# Patient Record
Sex: MALE | Race: WHITE | NOT HISPANIC OR LATINO
[De-identification: names, ages, dates, MRNs, and addresses within clinical notes are randomized per-mention and may not be internally consistent; named-entity substitution may affect disease eponyms.]

---

## 2018-01-16 ENCOUNTER — APPOINTMENT (OUTPATIENT)
Dept: ORTHOPEDIC SURGERY | Facility: CLINIC | Age: 53
End: 2018-01-16
Payer: COMMERCIAL

## 2018-01-16 VITALS
HEIGHT: 66 IN | HEART RATE: 71 BPM | WEIGHT: 245 LBS | BODY MASS INDEX: 39.37 KG/M2 | SYSTOLIC BLOOD PRESSURE: 147 MMHG | DIASTOLIC BLOOD PRESSURE: 78 MMHG

## 2018-01-16 DIAGNOSIS — Z78.9 OTHER SPECIFIED HEALTH STATUS: ICD-10-CM

## 2018-01-16 DIAGNOSIS — C44.90 UNSPECIFIED MALIGNANT NEOPLASM OF SKIN, UNSPECIFIED: ICD-10-CM

## 2018-01-16 DIAGNOSIS — M21.41 FLAT FOOT [PES PLANUS] (ACQUIRED), RIGHT FOOT: ICD-10-CM

## 2018-01-16 DIAGNOSIS — Z86.69 PERSONAL HISTORY OF OTHER DISEASES OF THE NERVOUS SYSTEM AND SENSE ORGANS: ICD-10-CM

## 2018-01-16 DIAGNOSIS — Z80.9 FAMILY HISTORY OF MALIGNANT NEOPLASM, UNSPECIFIED: ICD-10-CM

## 2018-01-16 DIAGNOSIS — Q66.6 OTHER CONGENITAL VALGUS DEFORMITIES OF FEET: ICD-10-CM

## 2018-01-16 DIAGNOSIS — Z82.61 FAMILY HISTORY OF ARTHRITIS: ICD-10-CM

## 2018-01-16 DIAGNOSIS — G89.29 PAIN IN RIGHT ANKLE AND JOINTS OF RIGHT FOOT: ICD-10-CM

## 2018-01-16 DIAGNOSIS — M25.571 PAIN IN RIGHT ANKLE AND JOINTS OF RIGHT FOOT: ICD-10-CM

## 2018-01-16 PROCEDURE — 99203 OFFICE O/P NEW LOW 30 MIN: CPT

## 2018-01-16 PROCEDURE — 73610 X-RAY EXAM OF ANKLE: CPT | Mod: RT

## 2018-01-16 PROCEDURE — 73620 X-RAY EXAM OF FOOT: CPT | Mod: RT

## 2018-01-16 RX ORDER — CHROMIUM 200 MCG
TABLET ORAL
Refills: 0 | Status: ACTIVE | COMMUNITY

## 2018-01-16 RX ORDER — SILODOSIN 8 MG/1
CAPSULE ORAL
Refills: 0 | Status: ACTIVE | COMMUNITY

## 2018-01-16 RX ORDER — CETIRIZINE HYDROCHLORIDE 10 MG/1
10 TABLET, COATED ORAL
Refills: 0 | Status: ACTIVE | COMMUNITY

## 2018-01-17 ENCOUNTER — TRANSCRIPTION ENCOUNTER (OUTPATIENT)
Age: 53
End: 2018-01-17

## 2018-02-05 ENCOUNTER — FORM ENCOUNTER (OUTPATIENT)
Age: 53
End: 2018-02-05

## 2018-02-06 ENCOUNTER — APPOINTMENT (OUTPATIENT)
Dept: CT IMAGING | Facility: CLINIC | Age: 53
End: 2018-02-06
Payer: COMMERCIAL

## 2018-02-06 ENCOUNTER — OUTPATIENT (OUTPATIENT)
Dept: OUTPATIENT SERVICES | Facility: HOSPITAL | Age: 53
LOS: 1 days | End: 2018-02-06
Payer: COMMERCIAL

## 2018-02-06 DIAGNOSIS — Z00.8 ENCOUNTER FOR OTHER GENERAL EXAMINATION: ICD-10-CM

## 2018-02-06 PROCEDURE — 73700 CT LOWER EXTREMITY W/O DYE: CPT

## 2018-02-06 PROCEDURE — 73700 CT LOWER EXTREMITY W/O DYE: CPT | Mod: 26,RT

## 2018-02-06 PROCEDURE — 76376 3D RENDER W/INTRP POSTPROCES: CPT

## 2018-02-06 PROCEDURE — 76376 3D RENDER W/INTRP POSTPROCES: CPT | Mod: 26

## 2018-03-06 ENCOUNTER — APPOINTMENT (OUTPATIENT)
Dept: ORTHOPEDIC SURGERY | Facility: CLINIC | Age: 53
End: 2018-03-06
Payer: COMMERCIAL

## 2018-03-06 VITALS
HEIGHT: 66 IN | BODY MASS INDEX: 39.37 KG/M2 | HEART RATE: 71 BPM | WEIGHT: 245 LBS | DIASTOLIC BLOOD PRESSURE: 82 MMHG | SYSTOLIC BLOOD PRESSURE: 149 MMHG

## 2018-03-06 PROCEDURE — 99213 OFFICE O/P EST LOW 20 MIN: CPT

## 2018-04-17 ENCOUNTER — OUTPATIENT (OUTPATIENT)
Dept: OUTPATIENT SERVICES | Facility: HOSPITAL | Age: 53
LOS: 1 days | End: 2018-04-17
Payer: COMMERCIAL

## 2018-04-17 VITALS
DIASTOLIC BLOOD PRESSURE: 83 MMHG | SYSTOLIC BLOOD PRESSURE: 147 MMHG | WEIGHT: 248.9 LBS | OXYGEN SATURATION: 96 % | HEART RATE: 69 BPM | RESPIRATION RATE: 16 BRPM | HEIGHT: 67 IN | TEMPERATURE: 98 F

## 2018-04-17 DIAGNOSIS — Z01.818 ENCOUNTER FOR OTHER PREPROCEDURAL EXAMINATION: ICD-10-CM

## 2018-04-17 DIAGNOSIS — M21.961 UNSPECIFIED ACQUIRED DEFORMITY OF RIGHT LOWER LEG: ICD-10-CM

## 2018-04-17 DIAGNOSIS — M21.41 FLAT FOOT [PES PLANUS] (ACQUIRED), RIGHT FOOT: ICD-10-CM

## 2018-04-17 DIAGNOSIS — M19.071 PRIMARY OSTEOARTHRITIS, RIGHT ANKLE AND FOOT: ICD-10-CM

## 2018-04-17 LAB
ALBUMIN SERPL ELPH-MCNC: 4.2 G/DL — SIGNIFICANT CHANGE UP (ref 3.3–5)
ALP SERPL-CCNC: 68 U/L — SIGNIFICANT CHANGE UP (ref 30–120)
ALT FLD-CCNC: 44 U/L DA — SIGNIFICANT CHANGE UP (ref 10–60)
ANION GAP SERPL CALC-SCNC: 7 MMOL/L — SIGNIFICANT CHANGE UP (ref 5–17)
AST SERPL-CCNC: 18 U/L — SIGNIFICANT CHANGE UP (ref 10–40)
BILIRUB SERPL-MCNC: 0.3 MG/DL — SIGNIFICANT CHANGE UP (ref 0.2–1.2)
BUN SERPL-MCNC: 20 MG/DL — SIGNIFICANT CHANGE UP (ref 7–23)
CALCIUM SERPL-MCNC: 9.8 MG/DL — SIGNIFICANT CHANGE UP (ref 8.4–10.5)
CHLORIDE SERPL-SCNC: 107 MMOL/L — SIGNIFICANT CHANGE UP (ref 96–108)
CO2 SERPL-SCNC: 26 MMOL/L — SIGNIFICANT CHANGE UP (ref 22–31)
CREAT SERPL-MCNC: 0.93 MG/DL — SIGNIFICANT CHANGE UP (ref 0.5–1.3)
GLUCOSE SERPL-MCNC: 143 MG/DL — HIGH (ref 70–99)
HBA1C BLD-MCNC: 6.8 % — HIGH (ref 4–5.6)
HCT VFR BLD CALC: 41.2 % — SIGNIFICANT CHANGE UP (ref 39–50)
HGB BLD-MCNC: 14.1 G/DL — SIGNIFICANT CHANGE UP (ref 13–17)
MCHC RBC-ENTMCNC: 29 PG — SIGNIFICANT CHANGE UP (ref 27–34)
MCHC RBC-ENTMCNC: 34.3 GM/DL — SIGNIFICANT CHANGE UP (ref 32–36)
MCV RBC AUTO: 84.4 FL — SIGNIFICANT CHANGE UP (ref 80–100)
PLATELET # BLD AUTO: 172 K/UL — SIGNIFICANT CHANGE UP (ref 150–400)
POTASSIUM SERPL-MCNC: 4.8 MMOL/L — SIGNIFICANT CHANGE UP (ref 3.5–5.3)
POTASSIUM SERPL-SCNC: 4.8 MMOL/L — SIGNIFICANT CHANGE UP (ref 3.5–5.3)
PROT SERPL-MCNC: 7.7 G/DL — SIGNIFICANT CHANGE UP (ref 6–8.3)
RBC # BLD: 4.88 M/UL — SIGNIFICANT CHANGE UP (ref 4.2–5.8)
RBC # FLD: 14.3 % — SIGNIFICANT CHANGE UP (ref 10.3–14.5)
SODIUM SERPL-SCNC: 140 MMOL/L — SIGNIFICANT CHANGE UP (ref 135–145)
WBC # BLD: 9.5 K/UL — SIGNIFICANT CHANGE UP (ref 3.8–10.5)
WBC # FLD AUTO: 9.5 K/UL — SIGNIFICANT CHANGE UP (ref 3.8–10.5)

## 2018-04-17 PROCEDURE — 93005 ELECTROCARDIOGRAM TRACING: CPT

## 2018-04-17 PROCEDURE — 83036 HEMOGLOBIN GLYCOSYLATED A1C: CPT

## 2018-04-17 PROCEDURE — G0463: CPT

## 2018-04-17 PROCEDURE — 93010 ELECTROCARDIOGRAM REPORT: CPT | Mod: NC

## 2018-04-17 PROCEDURE — 80053 COMPREHEN METABOLIC PANEL: CPT

## 2018-04-17 PROCEDURE — 85027 COMPLETE CBC AUTOMATED: CPT

## 2018-04-17 RX ORDER — CHLORHEXIDINE GLUCONATE 213 G/1000ML
1 SOLUTION TOPICAL ONCE
Qty: 0 | Refills: 0 | Status: DISCONTINUED | OUTPATIENT
Start: 2018-04-17 | End: 2018-05-02

## 2018-04-17 NOTE — H&P PST ADULT - HISTORY OF PRESENT ILLNESS
53 yo  male is scheduled for "Right foot triple arthrosdesis" on 5/7/18 on 5/7/18 with Eduardo Clark MD.  Patient reports right foot injury when he was a teenager with longstanding pain and instability progressively worsening.  pain worst with weight bearing and linits his mobility and lifestyle.  Wears brace  right mid-foot to calf.

## 2018-04-17 NOTE — H&P PST ADULT - MUSCULOSKELETAL COMMENTS
right foot deformity ankle, skin is reddened due to pressure from brace Calcaneus bone deformed right with swelling and skin erythema over bony prominence

## 2018-04-17 NOTE — H&P PST ADULT - FAMILY HISTORY
Family history of stomach cancer     Mother  Still living? No  Family history of uterine cancer, Age at diagnosis: Age Unknown     Grandparent  Still living? No  Family history of uterine cancer, Age at diagnosis: Age Unknown

## 2018-04-17 NOTE — H&P PST ADULT - PROBLEM SELECTOR PLAN 1
"Right foot triple arthrodesis" on 5/7/18  pre op instructions were reviewed and sihned  Patient will obtain medical clearance

## 2018-04-17 NOTE — H&P PST ADULT - NEGATIVE ENMT SYMPTOMS
no nasal congestion/no hearing difficulty/no nose bleeds/no ear pain/no recurrent cold sores/no throat pain/no dysphagia/no tinnitus/no nasal discharge/no vertigo/no sinus symptoms/no dry mouth/no nasal obstruction/no post-nasal discharge/no abnormal taste sensation/no gum bleeding

## 2018-04-17 NOTE — H&P PST ADULT - PMH
Ankle deformity, right    Bladder spasms    BMI 39.0-39.9,adult    Depression    Dyslipidemia    History of squamous cell carcinoma excision  right cheek, 2015  Hypertension    Multiple sclerosis    Neuropathy  bilateral hands  Obesity (BMI 30-39.9)    Sleep apnea    Type 2 diabetes mellitus    Varicose veins  BLE

## 2018-04-20 RX ORDER — CHLORHEXIDINE GLUCONATE 213 G/1000ML
1 SOLUTION TOPICAL ONCE
Qty: 0 | Refills: 0 | Status: COMPLETED | OUTPATIENT
Start: 2018-05-07 | End: 2018-05-07

## 2018-05-04 RX ORDER — MAGNESIUM HYDROXIDE 400 MG/1
30 TABLET, CHEWABLE ORAL DAILY
Qty: 0 | Refills: 0 | Status: DISCONTINUED | OUTPATIENT
Start: 2018-05-07 | End: 2018-05-09

## 2018-05-04 RX ORDER — ONDANSETRON 8 MG/1
4 TABLET, FILM COATED ORAL EVERY 6 HOURS
Qty: 0 | Refills: 0 | Status: DISCONTINUED | OUTPATIENT
Start: 2018-05-07 | End: 2018-05-09

## 2018-05-04 RX ORDER — SODIUM CHLORIDE 9 MG/ML
1000 INJECTION, SOLUTION INTRAVENOUS
Qty: 0 | Refills: 0 | Status: DISCONTINUED | OUTPATIENT
Start: 2018-05-07 | End: 2018-05-08

## 2018-05-04 RX ORDER — DOCUSATE SODIUM 100 MG
100 CAPSULE ORAL THREE TIMES A DAY
Qty: 0 | Refills: 0 | Status: DISCONTINUED | OUTPATIENT
Start: 2018-05-07 | End: 2018-05-09

## 2018-05-04 RX ORDER — SENNA PLUS 8.6 MG/1
2 TABLET ORAL AT BEDTIME
Qty: 0 | Refills: 0 | Status: DISCONTINUED | OUTPATIENT
Start: 2018-05-07 | End: 2018-05-09

## 2018-05-04 RX ORDER — PANTOPRAZOLE SODIUM 20 MG/1
40 TABLET, DELAYED RELEASE ORAL
Qty: 0 | Refills: 0 | Status: DISCONTINUED | OUTPATIENT
Start: 2018-05-07 | End: 2018-05-09

## 2018-05-06 ENCOUNTER — TRANSCRIPTION ENCOUNTER (OUTPATIENT)
Age: 53
End: 2018-05-06

## 2018-05-07 ENCOUNTER — INPATIENT (INPATIENT)
Facility: HOSPITAL | Age: 53
LOS: 1 days | Discharge: HOME | DRG: 494 | End: 2018-05-09
Attending: ORTHOPAEDIC SURGERY | Admitting: ORTHOPAEDIC SURGERY
Payer: COMMERCIAL

## 2018-05-07 ENCOUNTER — RESULT REVIEW (OUTPATIENT)
Age: 53
End: 2018-05-07

## 2018-05-07 ENCOUNTER — APPOINTMENT (OUTPATIENT)
Dept: ORTHOPEDIC SURGERY | Facility: HOSPITAL | Age: 53
End: 2018-05-07

## 2018-05-07 VITALS
SYSTOLIC BLOOD PRESSURE: 136 MMHG | DIASTOLIC BLOOD PRESSURE: 86 MMHG | WEIGHT: 246.04 LBS | RESPIRATION RATE: 11 BRPM | TEMPERATURE: 98 F | OXYGEN SATURATION: 97 % | HEART RATE: 76 BPM | HEIGHT: 66 IN

## 2018-05-07 DIAGNOSIS — E11.9 TYPE 2 DIABETES MELLITUS WITHOUT COMPLICATIONS: ICD-10-CM

## 2018-05-07 DIAGNOSIS — I10 ESSENTIAL (PRIMARY) HYPERTENSION: ICD-10-CM

## 2018-05-07 DIAGNOSIS — E66.9 OBESITY, UNSPECIFIED: ICD-10-CM

## 2018-05-07 DIAGNOSIS — M19.071 PRIMARY OSTEOARTHRITIS, RIGHT ANKLE AND FOOT: ICD-10-CM

## 2018-05-07 DIAGNOSIS — G35 MULTIPLE SCLEROSIS: ICD-10-CM

## 2018-05-07 DIAGNOSIS — M21.41 FLAT FOOT [PES PLANUS] (ACQUIRED), RIGHT FOOT: ICD-10-CM

## 2018-05-07 DIAGNOSIS — Z01.818 ENCOUNTER FOR OTHER PREPROCEDURAL EXAMINATION: ICD-10-CM

## 2018-05-07 PROCEDURE — 28715 ARTHRODESIS TRIPLE: CPT | Mod: RT

## 2018-05-07 PROCEDURE — 88305 TISSUE EXAM BY PATHOLOGIST: CPT | Mod: 26

## 2018-05-07 PROCEDURE — 88311 DECALCIFY TISSUE: CPT | Mod: 26

## 2018-05-07 PROCEDURE — 99223 1ST HOSP IP/OBS HIGH 75: CPT

## 2018-05-07 RX ORDER — VENLAFAXINE HCL 75 MG
225 CAPSULE, EXT RELEASE 24 HR ORAL DAILY
Qty: 0 | Refills: 0 | Status: DISCONTINUED | OUTPATIENT
Start: 2018-05-07 | End: 2018-05-09

## 2018-05-07 RX ORDER — DEXTROSE 50 % IN WATER 50 %
25 SYRINGE (ML) INTRAVENOUS ONCE
Qty: 0 | Refills: 0 | Status: DISCONTINUED | OUTPATIENT
Start: 2018-05-07 | End: 2018-05-09

## 2018-05-07 RX ORDER — AMLODIPINE BESYLATE 2.5 MG/1
5 TABLET ORAL DAILY
Qty: 0 | Refills: 0 | Status: DISCONTINUED | OUTPATIENT
Start: 2018-05-07 | End: 2018-05-09

## 2018-05-07 RX ORDER — LOSARTAN POTASSIUM 100 MG/1
50 TABLET, FILM COATED ORAL DAILY
Qty: 0 | Refills: 0 | Status: DISCONTINUED | OUTPATIENT
Start: 2018-05-07 | End: 2018-05-09

## 2018-05-07 RX ORDER — DEXTROSE 50 % IN WATER 50 %
12.5 SYRINGE (ML) INTRAVENOUS ONCE
Qty: 0 | Refills: 0 | Status: DISCONTINUED | OUTPATIENT
Start: 2018-05-07 | End: 2018-05-09

## 2018-05-07 RX ORDER — HYDROMORPHONE HYDROCHLORIDE 2 MG/ML
0.5 INJECTION INTRAMUSCULAR; INTRAVENOUS; SUBCUTANEOUS
Qty: 0 | Refills: 0 | Status: DISCONTINUED | OUTPATIENT
Start: 2018-05-07 | End: 2018-05-09

## 2018-05-07 RX ORDER — DEXTROSE 50 % IN WATER 50 %
1 SYRINGE (ML) INTRAVENOUS ONCE
Qty: 0 | Refills: 0 | Status: DISCONTINUED | OUTPATIENT
Start: 2018-05-07 | End: 2018-05-09

## 2018-05-07 RX ORDER — SODIUM CHLORIDE 9 MG/ML
1000 INJECTION, SOLUTION INTRAVENOUS
Qty: 0 | Refills: 0 | Status: DISCONTINUED | OUTPATIENT
Start: 2018-05-07 | End: 2018-05-09

## 2018-05-07 RX ORDER — ONDANSETRON 8 MG/1
4 TABLET, FILM COATED ORAL ONCE
Qty: 0 | Refills: 0 | Status: COMPLETED | OUTPATIENT
Start: 2018-05-07 | End: 2018-05-07

## 2018-05-07 RX ORDER — METFORMIN HYDROCHLORIDE 850 MG/1
1000 TABLET ORAL
Qty: 0 | Refills: 0 | Status: DISCONTINUED | OUTPATIENT
Start: 2018-05-08 | End: 2018-05-09

## 2018-05-07 RX ORDER — HYDROMORPHONE HYDROCHLORIDE 2 MG/ML
0.5 INJECTION INTRAMUSCULAR; INTRAVENOUS; SUBCUTANEOUS
Qty: 0 | Refills: 0 | Status: DISCONTINUED | OUTPATIENT
Start: 2018-05-07 | End: 2018-05-07

## 2018-05-07 RX ORDER — INSULIN LISPRO 100/ML
VIAL (ML) SUBCUTANEOUS
Qty: 0 | Refills: 0 | Status: DISCONTINUED | OUTPATIENT
Start: 2018-05-07 | End: 2018-05-09

## 2018-05-07 RX ORDER — ATORVASTATIN CALCIUM 80 MG/1
10 TABLET, FILM COATED ORAL AT BEDTIME
Qty: 0 | Refills: 0 | Status: DISCONTINUED | OUTPATIENT
Start: 2018-05-07 | End: 2018-05-09

## 2018-05-07 RX ORDER — CEFAZOLIN SODIUM 1 G
2000 VIAL (EA) INJECTION ONCE
Qty: 0 | Refills: 0 | Status: COMPLETED | OUTPATIENT
Start: 2018-05-07 | End: 2018-05-07

## 2018-05-07 RX ORDER — ACETAMINOPHEN 500 MG
1000 TABLET ORAL EVERY 8 HOURS
Qty: 0 | Refills: 0 | Status: DISCONTINUED | OUTPATIENT
Start: 2018-05-08 | End: 2018-05-09

## 2018-05-07 RX ORDER — ASPIRIN/CALCIUM CARB/MAGNESIUM 324 MG
162 TABLET ORAL EVERY 12 HOURS
Qty: 0 | Refills: 0 | Status: DISCONTINUED | OUTPATIENT
Start: 2018-05-08 | End: 2018-05-09

## 2018-05-07 RX ORDER — BUPROPION HYDROCHLORIDE 150 MG/1
300 TABLET, EXTENDED RELEASE ORAL DAILY
Qty: 0 | Refills: 0 | Status: DISCONTINUED | OUTPATIENT
Start: 2018-05-07 | End: 2018-05-09

## 2018-05-07 RX ORDER — SODIUM CHLORIDE 9 MG/ML
1000 INJECTION, SOLUTION INTRAVENOUS
Qty: 0 | Refills: 0 | Status: DISCONTINUED | OUTPATIENT
Start: 2018-05-07 | End: 2018-05-07

## 2018-05-07 RX ORDER — ACETAMINOPHEN 500 MG
1000 TABLET ORAL EVERY 6 HOURS
Qty: 0 | Refills: 0 | Status: COMPLETED | OUTPATIENT
Start: 2018-05-07 | End: 2018-05-08

## 2018-05-07 RX ORDER — CEFAZOLIN SODIUM 1 G
2000 VIAL (EA) INJECTION EVERY 8 HOURS
Qty: 0 | Refills: 0 | Status: COMPLETED | OUTPATIENT
Start: 2018-05-07 | End: 2018-05-08

## 2018-05-07 RX ORDER — TAMSULOSIN HYDROCHLORIDE 0.4 MG/1
0.8 CAPSULE ORAL AT BEDTIME
Qty: 0 | Refills: 0 | Status: DISCONTINUED | OUTPATIENT
Start: 2018-05-07 | End: 2018-05-08

## 2018-05-07 RX ORDER — OXYCODONE HYDROCHLORIDE 5 MG/1
5 TABLET ORAL
Qty: 0 | Refills: 0 | Status: DISCONTINUED | OUTPATIENT
Start: 2018-05-07 | End: 2018-05-08

## 2018-05-07 RX ORDER — OXYCODONE HYDROCHLORIDE 5 MG/1
10 TABLET ORAL
Qty: 0 | Refills: 0 | Status: DISCONTINUED | OUTPATIENT
Start: 2018-05-07 | End: 2018-05-08

## 2018-05-07 RX ORDER — GLUCAGON INJECTION, SOLUTION 0.5 MG/.1ML
1 INJECTION, SOLUTION SUBCUTANEOUS ONCE
Qty: 0 | Refills: 0 | Status: DISCONTINUED | OUTPATIENT
Start: 2018-05-07 | End: 2018-05-09

## 2018-05-07 RX ADMIN — SODIUM CHLORIDE 100 MILLILITER(S): 9 INJECTION, SOLUTION INTRAVENOUS at 23:20

## 2018-05-07 RX ADMIN — HYDROMORPHONE HYDROCHLORIDE 0.5 MILLIGRAM(S): 2 INJECTION INTRAMUSCULAR; INTRAVENOUS; SUBCUTANEOUS at 18:20

## 2018-05-07 RX ADMIN — Medication 100 MILLIGRAM(S): at 22:23

## 2018-05-07 RX ADMIN — ONDANSETRON 4 MILLIGRAM(S): 8 TABLET, FILM COATED ORAL at 18:27

## 2018-05-07 RX ADMIN — HYDROMORPHONE HYDROCHLORIDE 0.5 MILLIGRAM(S): 2 INJECTION INTRAMUSCULAR; INTRAVENOUS; SUBCUTANEOUS at 23:30

## 2018-05-07 RX ADMIN — SENNA PLUS 2 TABLET(S): 8.6 TABLET ORAL at 23:16

## 2018-05-07 RX ADMIN — Medication 100 MILLIGRAM(S): at 22:14

## 2018-05-07 RX ADMIN — SODIUM CHLORIDE 100 MILLILITER(S): 9 INJECTION, SOLUTION INTRAVENOUS at 18:45

## 2018-05-07 RX ADMIN — ATORVASTATIN CALCIUM 10 MILLIGRAM(S): 80 TABLET, FILM COATED ORAL at 22:14

## 2018-05-07 RX ADMIN — HYDROMORPHONE HYDROCHLORIDE 0.5 MILLIGRAM(S): 2 INJECTION INTRAMUSCULAR; INTRAVENOUS; SUBCUTANEOUS at 20:36

## 2018-05-07 RX ADMIN — Medication 400 MILLIGRAM(S): at 22:23

## 2018-05-07 RX ADMIN — Medication 1: at 20:00

## 2018-05-07 RX ADMIN — HYDROMORPHONE HYDROCHLORIDE 0.5 MILLIGRAM(S): 2 INJECTION INTRAMUSCULAR; INTRAVENOUS; SUBCUTANEOUS at 19:30

## 2018-05-07 RX ADMIN — CHLORHEXIDINE GLUCONATE 1 APPLICATION(S): 213 SOLUTION TOPICAL at 11:24

## 2018-05-07 RX ADMIN — HYDROMORPHONE HYDROCHLORIDE 0.5 MILLIGRAM(S): 2 INJECTION INTRAMUSCULAR; INTRAVENOUS; SUBCUTANEOUS at 19:31

## 2018-05-07 RX ADMIN — HYDROMORPHONE HYDROCHLORIDE 0.5 MILLIGRAM(S): 2 INJECTION INTRAMUSCULAR; INTRAVENOUS; SUBCUTANEOUS at 18:55

## 2018-05-07 RX ADMIN — Medication 1000 MILLIGRAM(S): at 22:26

## 2018-05-07 RX ADMIN — HYDROMORPHONE HYDROCHLORIDE 0.5 MILLIGRAM(S): 2 INJECTION INTRAMUSCULAR; INTRAVENOUS; SUBCUTANEOUS at 18:40

## 2018-05-07 RX ADMIN — HYDROMORPHONE HYDROCHLORIDE 0.5 MILLIGRAM(S): 2 INJECTION INTRAMUSCULAR; INTRAVENOUS; SUBCUTANEOUS at 23:11

## 2018-05-07 NOTE — CONSULT NOTE ADULT - SUBJECTIVE AND OBJECTIVE BOX
Patient is a 52y old  Male who presents with a chief complaint of right foot surgery (07 May 2018 11:14)        HPI: primary severe osteoarthritis of joint s/p triple arthrodesis right foot.    known MS without respiratory complications.    chronic neuropathy    obesity contributing to JERRELL; uses CPAP.    Type 2 DM controlled.      PAST MEDICAL & SURGICAL HISTORY:  BMI 39.0-39.9,adult  Obesity (BMI 30-39.9)  Ankle deformity, right  Neuropathy: bilateral hands  Bladder spasms  Varicose veins: BLE  Sleep apnea  Multiple sclerosis  Depression  Hypertension  Dyslipidemia  Type 2 diabetes mellitus  History of squamous cell carcinoma excision: right cheek, 2015  No significant past surgical history      REVIEW OF SYSTEMS:    negative unless otherwise specified in HPI.      MEDICATIONS  (STANDING):  lactated ringers. 1000 milliLiter(s) (100 mL/Hr) IV Continuous <Continuous>    MEDICATIONS  (PRN):  HYDROmorphone  Injectable 0.5 milliGRAM(s) IV Push every 10 minutes PRN Severe Pain (7 - 10)      Allergies    latex (Rash)  No Known Drug Allergies    Intolerances        SOCIAL HISTORY: no toxic habits reported    FAMILY HISTORY:  Family history of uterine cancer (Grandparent)  Family history of stomach cancer  Family history of uterine cancer (Mother)      Vital Signs Last 24 Hrs  T(C): 36.6 (07 May 2018 11:09), Max: 36.6 (07 May 2018 11:09)  T(F): 97.8 (07 May 2018 11:09), Max: 97.8 (07 May 2018 11:09)  HR: 76 (07 May 2018 11:09) (76 - 76)  BP: 136/86 (07 May 2018 11:09) (136/86 - 136/86)  BP(mean): --  RR: 11 (07 May 2018 11:09) (11 - 11)  SpO2: 97% (07 May 2018 11:09) (97% - 97%)    PHYSICAL EXAM:  GENERAL: No apparent distress  HEAD:  Atraumatic, Normocephalic  EYES: conjunctiva and sclera clear  ENMT: Moist mucous membranes  NECK: Supple  CHEST/LUNG: Clear to auscultation; no rales/wheeze or rubs  HEART: Regular rate and rhythm, no murmurs, rubs or gallops  ABDOMEN: Soft, Nontender, Nondistended; Bowel sounds present, OBESE  EXTREMITIES:  No clubbing, cyanosis or edema  SKIN: No rashes or lesions  NERVOUS SYSTEM:  Alert & Oriented X3; Bilateral lower extremity mobile, sensation to light touch intact  INCISION:  Dressing dry and intact    LABS:                  A1C 6.8                            IMAGING: imaging reviewed personally - triple arthrodesis right foot    Consultant Notes Reviewed and Care Discussed with relevant Consultants/Other Providers.

## 2018-05-07 NOTE — CONSULT NOTE ADULT - SUBJECTIVE AND OBJECTIVE BOX
PULMONARY/CRITICAL CARE        Patient is a 52y old  Male who presents with a chief complaint of right foot surgery (07 May 2018 11:14)    BRIEF HOSPITAL COURSE: ***Stable postop  Hx Guido on autopap    Events last 24 hours: ***    PAST MEDICAL & SURGICAL HISTORY:  BMI 39.0-39.9,adult  Obesity (BMI 30-39.9)  Ankle deformity, right  Neuropathy: bilateral hands  Bladder spasms  Varicose veins: BLE  Sleep apnea  Multiple sclerosis  Depression  Hypertension  Dyslipidemia  Type 2 diabetes mellitus  History of squamous cell carcinoma excision: right cheek, 2015  No significant past surgical history    Allergies    latex (Rash)  No Known Drug Allergies    Intolerances      FAMILY HISTORY:  Family history of uterine cancer (Grandparent)  Family history of stomach cancer  Family history of uterine cancer (Mother)      Review of Systems:  CONSTITUTIONAL: No fever, chills, or fatigue  EYES: No eye pain, visual disturbances, or discharge  ENMT:  No difficulty hearing, tinnitus, vertigo; No sinus or throat pain  NECK: No pain or stiffness  RESPIRATORY: No cough, wheezing, chills or hemoptysis; No shortness of breath  CARDIOVASCULAR: No chest pain, palpitations, dizziness, or leg swelling  GASTROINTESTINAL: No abdominal or epigastric pain. No nausea, vomiting, or hematemesis; No diarrhea or constipation. No melena or hematochezia.  GENITOURINARY: No dysuria, frequency, hematuria, or incontinence  NEUROLOGICAL: No headaches, memory loss, loss of strength, numbness, or tremors  SKIN: No itching, burning, rashes, or lesions   MUSCULOSKELETAL: No joint pain or swelling; right ankle pain  PSYCHIATRIC: No depression, anxiety, mood swings, or difficulty sleeping      Medications:        HYDROmorphone  Injectable 0.5 milliGRAM(s) IV Push every 10 minutes PRN  promethazine IVPB 6.25 milliGRAM(s) IV Intermittent once PRN              lactated ringers. 1000 milliLiter(s) IV Continuous <Continuous>                ICU Vital Signs Last 24 Hrs  T(C): 36.9 (07 May 2018 18:11), Max: 36.9 (07 May 2018 18:11)  T(F): 98.4 (07 May 2018 18:11), Max: 98.4 (07 May 2018 18:11)  HR: 93 (07 May 2018 18:45) (76 - 99)  BP: 126/78 (07 May 2018 18:45) (126/78 - 152/50)  BP(mean): --  ABP: --  ABP(mean): --  RR: 8 (07 May 2018 18:45) (8 - 18)  SpO2: 98% (07 May 2018 18:45) (97% - 100%)    Vital Signs Last 24 Hrs  T(C): 36.9 (07 May 2018 18:11), Max: 36.9 (07 May 2018 18:11)  T(F): 98.4 (07 May 2018 18:11), Max: 98.4 (07 May 2018 18:11)  HR: 93 (07 May 2018 18:45) (76 - 99)  BP: 126/78 (07 May 2018 18:45) (126/78 - 152/50)  BP(mean): --  RR: 8 (07 May 2018 18:45) (8 - 18)  SpO2: 98% (07 May 2018 18:45) (97% - 100%)        I&O's Detail    07 May 2018 07:01  -  07 May 2018 18:45  --------------------------------------------------------  IN:    lactated ringers.: 1400 mL  Total IN: 1400 mL    OUT:    Estimated Blood Loss: 25 mL  Total OUT: 25 mL    Total NET: 1375 mL            LABS:                CAPILLARY BLOOD GLUCOSE      POCT Blood Glucose.: 137 mg/dL (07 May 2018 16:13)        CULTURES:      Physical Examination: obese male nad    General: No acute distress.      HEENT: Pupils equal, reactive to light.  Symmetric.    PULM: Clear to auscultation bilaterally, no significant sputum production    CVS: Regular rate and rhythm, no murmurs, rubs, or gallops    ABD: Soft, nondistended, nontender, normoactive bowel sounds, no masses    EXT: No edema, nontender  Right ankle bandaged.     SKIN: Warm and well perfused, no rashes noted.    NEURO: Alert, oriented, interactive, nonfocal    RADIOLOGY: ***    CRITICAL CARE TIME SPENT: ***

## 2018-05-07 NOTE — PATIENT PROFILE ADULT. - AS SC BRADEN SENSORY
Assessment completed. Pts dressings are C/D/I. Pt denies pain. States he needs assistance with digital bowel disimpaction on a daily basis. Will provide this care after AM meds and breakfast with sterile lubricating jelly. Pt is A&O and very familiar with care and needs, able to tell staff exactly what he needs to ensure that he maintains care as normal. No s/s distress noted. Low air loss mattress in use, bed is low with call light in reach. PICC line dressing is C/D/I with no s/s infection, fluids at TKO with intermittent IV ABX. Pleasant and cooperative with somewhat flat affect. Pt reports loss and depressed affect. Encouraged and supported needs.    (3) slightly limited

## 2018-05-07 NOTE — BRIEF OPERATIVE NOTE - PROCEDURE
Triple arthrodesis of right hindfoot  05/07/2018    Active  Wilder Diaz <<-----Click on this checkbox to enter Procedure

## 2018-05-08 ENCOUNTER — TRANSCRIPTION ENCOUNTER (OUTPATIENT)
Age: 53
End: 2018-05-08

## 2018-05-08 DIAGNOSIS — R33.8 OTHER RETENTION OF URINE: ICD-10-CM

## 2018-05-08 DIAGNOSIS — N31.9 NEUROMUSCULAR DYSFUNCTION OF BLADDER, UNSPECIFIED: ICD-10-CM

## 2018-05-08 LAB
ANION GAP SERPL CALC-SCNC: 8 MMOL/L — SIGNIFICANT CHANGE UP (ref 5–17)
BASOPHILS # BLD AUTO: 0.1 K/UL — SIGNIFICANT CHANGE UP (ref 0–0.2)
BASOPHILS NFR BLD AUTO: 0.9 % — SIGNIFICANT CHANGE UP (ref 0–2)
BUN SERPL-MCNC: 17 MG/DL — SIGNIFICANT CHANGE UP (ref 7–23)
CALCIUM SERPL-MCNC: 8.9 MG/DL — SIGNIFICANT CHANGE UP (ref 8.4–10.5)
CHLORIDE SERPL-SCNC: 102 MMOL/L — SIGNIFICANT CHANGE UP (ref 96–108)
CO2 SERPL-SCNC: 27 MMOL/L — SIGNIFICANT CHANGE UP (ref 22–31)
CREAT SERPL-MCNC: 0.77 MG/DL — SIGNIFICANT CHANGE UP (ref 0.5–1.3)
EOSINOPHIL # BLD AUTO: 0.1 K/UL — SIGNIFICANT CHANGE UP (ref 0–0.5)
EOSINOPHIL NFR BLD AUTO: 0.8 % — SIGNIFICANT CHANGE UP (ref 0–6)
GLUCOSE SERPL-MCNC: 125 MG/DL — HIGH (ref 70–99)
HCT VFR BLD CALC: 37.1 % — LOW (ref 39–50)
HGB BLD-MCNC: 12.5 G/DL — LOW (ref 13–17)
LYMPHOCYTES # BLD AUTO: 27.8 % — SIGNIFICANT CHANGE UP (ref 13–44)
LYMPHOCYTES # BLD AUTO: 3.3 K/UL — SIGNIFICANT CHANGE UP (ref 1–3.3)
MCHC RBC-ENTMCNC: 28.8 PG — SIGNIFICANT CHANGE UP (ref 27–34)
MCHC RBC-ENTMCNC: 33.8 GM/DL — SIGNIFICANT CHANGE UP (ref 32–36)
MCV RBC AUTO: 85.2 FL — SIGNIFICANT CHANGE UP (ref 80–100)
MONOCYTES # BLD AUTO: 0.8 K/UL — SIGNIFICANT CHANGE UP (ref 0–0.9)
MONOCYTES NFR BLD AUTO: 7.1 % — SIGNIFICANT CHANGE UP (ref 2–14)
NEUTROPHILS # BLD AUTO: 7.5 K/UL — HIGH (ref 1.8–7.4)
NEUTROPHILS NFR BLD AUTO: 63.4 % — SIGNIFICANT CHANGE UP (ref 43–77)
PLATELET # BLD AUTO: 182 K/UL — SIGNIFICANT CHANGE UP (ref 150–400)
POTASSIUM SERPL-MCNC: 4.7 MMOL/L — SIGNIFICANT CHANGE UP (ref 3.5–5.3)
POTASSIUM SERPL-SCNC: 4.7 MMOL/L — SIGNIFICANT CHANGE UP (ref 3.5–5.3)
RBC # BLD: 4.35 M/UL — SIGNIFICANT CHANGE UP (ref 4.2–5.8)
RBC # FLD: 14.8 % — HIGH (ref 10.3–14.5)
SODIUM SERPL-SCNC: 137 MMOL/L — SIGNIFICANT CHANGE UP (ref 135–145)
WBC # BLD: 11.8 K/UL — HIGH (ref 3.8–10.5)
WBC # FLD AUTO: 11.8 K/UL — HIGH (ref 3.8–10.5)

## 2018-05-08 PROCEDURE — 12345: CPT | Mod: NC

## 2018-05-08 PROCEDURE — 99233 SBSQ HOSP IP/OBS HIGH 50: CPT

## 2018-05-08 RX ORDER — ASPIRIN/CALCIUM CARB/MAGNESIUM 324 MG
2 TABLET ORAL
Qty: 164 | Refills: 0
Start: 2018-05-08 | End: 2018-06-17

## 2018-05-08 RX ORDER — PANTOPRAZOLE SODIUM 20 MG/1
1 TABLET, DELAYED RELEASE ORAL
Qty: 30 | Refills: 1
Start: 2018-05-08 | End: 2018-07-06

## 2018-05-08 RX ORDER — ACETAMINOPHEN 500 MG
2 TABLET ORAL
Qty: 0 | Refills: 0 | DISCHARGE
Start: 2018-05-08

## 2018-05-08 RX ORDER — ASPIRIN/CALCIUM CARB/MAGNESIUM 324 MG
1 TABLET ORAL
Qty: 0 | Refills: 0 | COMMUNITY

## 2018-05-08 RX ORDER — ASPIRIN/CALCIUM CARB/MAGNESIUM 324 MG
1 TABLET ORAL
Qty: 0 | Refills: 0 | DISCHARGE
Start: 2018-05-08 | End: 2018-06-17

## 2018-05-08 RX ORDER — SILODOSIN 4 MG/1
8 CAPSULE ORAL ONCE
Qty: 0 | Refills: 0 | Status: COMPLETED | OUTPATIENT
Start: 2018-05-08 | End: 2018-05-08

## 2018-05-08 RX ORDER — DOCUSATE SODIUM 100 MG
1 CAPSULE ORAL
Qty: 0 | Refills: 0 | DISCHARGE
Start: 2018-05-08

## 2018-05-08 RX ORDER — TAPENTADOL HYDROCHLORIDE 50 MG/1
50 TABLET, FILM COATED ORAL EVERY 4 HOURS
Qty: 0 | Refills: 0 | Status: DISCONTINUED | OUTPATIENT
Start: 2018-05-08 | End: 2018-05-09

## 2018-05-08 RX ORDER — SENNA PLUS 8.6 MG/1
2 TABLET ORAL
Qty: 0 | Refills: 0 | DISCHARGE
Start: 2018-05-08

## 2018-05-08 RX ORDER — SILODOSIN 4 MG/1
8 CAPSULE ORAL AT BEDTIME
Qty: 0 | Refills: 0 | Status: DISCONTINUED | OUTPATIENT
Start: 2018-05-08 | End: 2018-05-09

## 2018-05-08 RX ADMIN — TAPENTADOL HYDROCHLORIDE 50 MILLIGRAM(S): 50 TABLET, FILM COATED ORAL at 19:10

## 2018-05-08 RX ADMIN — Medication 162 MILLIGRAM(S): at 08:11

## 2018-05-08 RX ADMIN — METFORMIN HYDROCHLORIDE 1000 MILLIGRAM(S): 850 TABLET ORAL at 08:12

## 2018-05-08 RX ADMIN — HYDROMORPHONE HYDROCHLORIDE 0.5 MILLIGRAM(S): 2 INJECTION INTRAMUSCULAR; INTRAVENOUS; SUBCUTANEOUS at 03:50

## 2018-05-08 RX ADMIN — SILODOSIN 8 MILLIGRAM(S): 4 CAPSULE ORAL at 09:15

## 2018-05-08 RX ADMIN — PANTOPRAZOLE SODIUM 40 MILLIGRAM(S): 20 TABLET, DELAYED RELEASE ORAL at 05:58

## 2018-05-08 RX ADMIN — Medication 225 MILLIGRAM(S): at 12:35

## 2018-05-08 RX ADMIN — Medication 100 MILLIGRAM(S): at 05:57

## 2018-05-08 RX ADMIN — METFORMIN HYDROCHLORIDE 1000 MILLIGRAM(S): 850 TABLET ORAL at 16:59

## 2018-05-08 RX ADMIN — TAPENTADOL HYDROCHLORIDE 50 MILLIGRAM(S): 50 TABLET, FILM COATED ORAL at 17:35

## 2018-05-08 RX ADMIN — Medication 1: at 12:35

## 2018-05-08 RX ADMIN — BUPROPION HYDROCHLORIDE 300 MILLIGRAM(S): 150 TABLET, EXTENDED RELEASE ORAL at 12:35

## 2018-05-08 RX ADMIN — HYDROMORPHONE HYDROCHLORIDE 0.5 MILLIGRAM(S): 2 INJECTION INTRAMUSCULAR; INTRAVENOUS; SUBCUTANEOUS at 20:43

## 2018-05-08 RX ADMIN — Medication 100 MILLIGRAM(S): at 05:58

## 2018-05-08 RX ADMIN — Medication 1000 MILLIGRAM(S): at 09:49

## 2018-05-08 RX ADMIN — Medication 100 MILLIGRAM(S): at 20:29

## 2018-05-08 RX ADMIN — HYDROMORPHONE HYDROCHLORIDE 0.5 MILLIGRAM(S): 2 INJECTION INTRAMUSCULAR; INTRAVENOUS; SUBCUTANEOUS at 20:28

## 2018-05-08 RX ADMIN — Medication 1000 MILLIGRAM(S): at 16:38

## 2018-05-08 RX ADMIN — Medication 162 MILLIGRAM(S): at 20:28

## 2018-05-08 RX ADMIN — SENNA PLUS 2 TABLET(S): 8.6 TABLET ORAL at 20:28

## 2018-05-08 RX ADMIN — TAPENTADOL HYDROCHLORIDE 50 MILLIGRAM(S): 50 TABLET, FILM COATED ORAL at 13:37

## 2018-05-08 RX ADMIN — SILODOSIN 8 MILLIGRAM(S): 4 CAPSULE ORAL at 20:35

## 2018-05-08 RX ADMIN — Medication 400 MILLIGRAM(S): at 09:49

## 2018-05-08 RX ADMIN — HYDROMORPHONE HYDROCHLORIDE 0.5 MILLIGRAM(S): 2 INJECTION INTRAMUSCULAR; INTRAVENOUS; SUBCUTANEOUS at 23:45

## 2018-05-08 RX ADMIN — Medication 400 MILLIGRAM(S): at 03:30

## 2018-05-08 RX ADMIN — Medication 1000 MILLIGRAM(S): at 16:43

## 2018-05-08 RX ADMIN — HYDROMORPHONE HYDROCHLORIDE 0.5 MILLIGRAM(S): 2 INJECTION INTRAMUSCULAR; INTRAVENOUS; SUBCUTANEOUS at 03:23

## 2018-05-08 RX ADMIN — Medication 1000 MILLIGRAM(S): at 23:45

## 2018-05-08 RX ADMIN — Medication 1000 MILLIGRAM(S): at 03:30

## 2018-05-08 RX ADMIN — ATORVASTATIN CALCIUM 10 MILLIGRAM(S): 80 TABLET, FILM COATED ORAL at 20:28

## 2018-05-08 NOTE — PHYSICAL THERAPY INITIAL EVALUATION ADULT - TRANSFER TRAINING, PT EVAL
Patient will be able to perform transfers with RW independently in 1 week Patient will be able to perform transfers with RW independently in 1-3 days

## 2018-05-08 NOTE — PHYSICAL THERAPY INITIAL EVALUATION ADULT - GAIT TRAINING, PT EVAL
Patient will be able to ambulate 150 feet with RW independently in 1 week Patient will be able to ambulate 50 feet with RW independently in 1-3 days

## 2018-05-08 NOTE — PHYSICAL THERAPY INITIAL EVALUATION ADULT - CRITERIA FOR SKILLED THERAPEUTIC INTERVENTIONS
impairments found/anticipated equipment needs at discharge/anticipated discharge recommendation/HCPT

## 2018-05-08 NOTE — PROGRESS NOTE ADULT - SUBJECTIVE AND OBJECTIVE BOX
Patient is a 52y old  Male who presents with a chief complaint of right foot surgery (08 May 2018 10:05)      Subjective: urinary retention - seen by Dr. Romeo - recommend discharge with Garrido.  Also, spoke with Dr. Campa earlier in AM - recommend the same.    MEDICATIONS  (STANDING):  acetaminophen   Tablet. 1000 milliGRAM(s) Oral every 8 hours  amLODIPine   Tablet 5 milliGRAM(s) Oral daily  aspirin enteric coated 162 milliGRAM(s) Oral every 12 hours  atorvastatin 10 milliGRAM(s) Oral at bedtime  buPROPion XL . 300 milliGRAM(s) Oral daily  dextrose 5%. 1000 milliLiter(s) (50 mL/Hr) IV Continuous <Continuous>  dextrose 50% Injectable 12.5 Gram(s) IV Push once  dextrose 50% Injectable 25 Gram(s) IV Push once  dextrose 50% Injectable 25 Gram(s) IV Push once  docusate sodium 100 milliGRAM(s) Oral three times a day  insulin lispro (HumaLOG) corrective regimen sliding scale   SubCutaneous three times a day before meals  losartan 50 milliGRAM(s) Oral daily  metFORMIN 1000 milliGRAM(s) Oral two times a day  pantoprazole    Tablet 40 milliGRAM(s) Oral before breakfast  senna 2 Tablet(s) Oral at bedtime  silodosin 8 milliGRAM(s) Oral at bedtime  venlafaxine XR. 225 milliGRAM(s) Oral daily    MEDICATIONS  (PRN):  dextrose Gel 1 Dose(s) Oral once PRN Blood Glucose LESS THAN 70 milliGRAM(s)/deciliter  glucagon  Injectable 1 milliGRAM(s) IntraMuscular once PRN Glucose LESS THAN 70 milligrams/deciliter  HYDROmorphone  Injectable 0.5 milliGRAM(s) IV Push every 3 hours PRN Severe Pain (7 - 10)  magnesium hydroxide Suspension 30 milliLiter(s) Oral daily PRN Constipation  ondansetron Injectable 4 milliGRAM(s) IV Push every 6 hours PRN Nausea and/or Vomiting  oxyCODONE    IR 5 milliGRAM(s) Oral every 3 hours PRN Mild Pain (1 - 3)  oxyCODONE    IR 10 milliGRAM(s) Oral every 3 hours PRN Moderate Pain (4 - 6)  promethazine IVPB 6.25 milliGRAM(s) IV Intermittent once PRN nausea/vomiting  tapentadol 50 milliGRAM(s) Oral every 6 hours PRN Moderate Pain (4 - 6)      Allergies    latex (Rash)  No Known Drug Allergies    Intolerances        REVIEW OF SYSTEMS:  negative unless otherwise specified above.    Vital Signs Last 24 Hrs  T(C): 37.4 (08 May 2018 11:18), Max: 37.4 (08 May 2018 11:18)  T(F): 99.3 (08 May 2018 11:18), Max: 99.3 (08 May 2018 11:18)  HR: 92 (08 May 2018 11:18) (67 - 99)  BP: 152/84 (08 May 2018 11:18) (114/71 - 152/84)  BP(mean): --  RR: 18 (08 May 2018 11:18) (8 - 18)  SpO2: 96% (08 May 2018 11:18) (94% - 100%)    PHYSICAL EXAM:  GENERAL: No apparent distress  HEAD:  Atraumatic, Normocephalic  EYES: conjunctiva and sclera clear  ENMT: Moist mucous membranes  NECK: Supple  CHEST/LUNG: Clear to auscultation bilaterally  HEART: Regular rate and rhythm  ABDOMEN: Soft, Nontender, Nondistended; Bowel sounds present, OBESE  EXTREMITIES:  No clubbing, cyanosis or edema  SKIN: No rashes or lesions  NERVOUS SYSTEM:  Alert & Oriented X3; Bilateral Lower extremity mobile, sensation to light touch intact  INCISION:  Dressing dry and intact        LABS:   RBC Count: 4.35 M/uL (05-08 @ 07:29)  Hemoglobin: 12.5 g/dL <L> (05-08 @ 07:29)  WBC Count: 11.8 K/uL <H> (05-08 @ 07:29)  Platelet Count - Automated: 182 K/uL (05-08 @ 07:29)  Hematocrit: 37.1 % <L> (05-08 @ 07:29)      05-08    137  |  102  |  17  ----------------------------<  125<H>  4.7   |  27  |  0.77    Ca    8.9      08 May 2018 07:29                                        IMAGING: images reviewed personally      Consultant Notes Reviewed and Care Discussed with relevant Consultants/Other Providers.

## 2018-05-08 NOTE — PHYSICAL THERAPY INITIAL EVALUATION ADULT - MANUAL MUSCLE TESTING RESULTS, REHAB EVAL
no strength deficits were identified Right ankle/foot not tested due to surgery/no strength deficits were identified

## 2018-05-08 NOTE — PROGRESS NOTE ADULT - ASSESSMENT
52 male    1. primary severe osteoarthritis of joint s/p triple arthrodesis right foot: opiates prn + bowel regimen.   Physical Therapy evaluation.    2. Obesity contributing to JERRELL: CPAP as per Dr. Kingston.    3. Type 2 DM controlled: metformin + moderate SSI    4. MS: monitor respiratory and ambulation status closely.    5. HTN: Blood pressure meds with hold parameters    6. BPH with urinary retention: home meds. monitor intake and output. harman placed after 2 prior straight cath's as per urology recs. PVR>999cc each time.    7. dvt prophylaxis per orthopedic protocol     8. dispo: home tomorrow.  d/w wife at bedside.

## 2018-05-08 NOTE — PROGRESS NOTE ADULT - SUBJECTIVE AND OBJECTIVE BOX
POST OPERATIVE DAY #:  [ ]   STATUS POST: Right  Triple Arthrodesis                      Patient is a 52y old  Male who presents with a chief complaint of right foot surgery (07 May 2018 11:14)  Unable to void last night , straight cathed, no void since  Pain well controlled with PRN supplementation  No SOB, CP or HA.    OBJECTIVE:     Vital Signs Last 24 Hrs  T(C): 36.9 (08 May 2018 07:42), Max: 36.9 (07 May 2018 18:11)  T(F): 98.4 (08 May 2018 07:42), Max: 98.4 (07 May 2018 18:11)  HR: 67 (08 May 2018 07:42) (67 - 99)  BP: 133/84 (08 May 2018 07:42) (114/71 - 152/50)  BP(mean): --  RR: 18 (08 May 2018 07:42) (8 - 18)  SpO2: 96% (08 May 2018 07:42) (94% - 100%)    Affected extremity:          Dressing:  serosanguinous drainage stained dependently, No shaina blood.  Reinforced with ace bandages.         Sensation; intact to light touch          Motor exam: Toes warm and mobile well perfused;  +capillary refill , +pedal pulse            LABS:                        12.5   11.8  )-----------( 182      ( 08 May 2018 07:29 )             37.1                   A/P :   s/p Right triple arthrodesis  -    Analgesics PRN  -    DVT ppx: ASA 162mg twice daily  -    Periop abx:  complete today  -    Urinary retention- HNV since straight cath this am  -    Weight bearing status:     NWB  Right lower extremity  -    Resume home meds  -    Physical Therapy  -    Occupational Therapy  -    Dispo: Home when medical and PT cleared

## 2018-05-08 NOTE — PHYSICAL THERAPY INITIAL EVALUATION ADULT - RANGE OF MOTION EXAMINATION, REHAB EVAL
right ankle/foot not tested due to surgery/bilateral lower extremity ROM was WFL (within functional limits)/bilateral upper extremity ROM was WFL (within functional limits)

## 2018-05-08 NOTE — CONSULT NOTE ADULT - SUBJECTIVE AND OBJECTIVE BOX
CHIEF COMPLAINT:    HPI:  yo male w/ MS, NB, DM dev UR post R foot surgery. Pt had not taken rapaflo x 2d prior to surgery    PAST MEDICAL & SURGICAL HISTORY:  BMI 39.0-39.9,adult  Obesity (BMI 30-39.9)  Ankle deformity, right  Neuropathy: bilateral hands  Bladder spasms  Varicose veins: BLE  Sleep apnea  Multiple sclerosis  Depression  Hypertension  Dyslipidemia  Type 2 diabetes mellitus  History of squamous cell carcinoma excision: right cheek, 2015  No significant past surgical history      REVIEW OF SYSTEMS:    CONSTITUTIONAL: No weakness, fevers or chills  EYES/ENT: No visual changes;  No vertigo or throat pain   NECK: No pain or stiffness  RESPIRATORY: No cough, wheezing, hemoptysis; No shortness of breath  CARDIOVASCULAR: No chest pain or palpitations  GASTROINTESTINAL: No abdominal or epigastric pain. No nausea, vomiting, or hematemesis; No diarrhea or constipation. No melena or hematochezia.  GENITOURINARY: No dysuria, frequency or hematuria  NEUROLOGICAL: No numbness or weakness  SKIN: No itching, burning, rashes, or lesions   All other review of systems is negative unless indicated above.    MEDICATIONS  (STANDING):  acetaminophen   Tablet. 1000 milliGRAM(s) Oral every 8 hours  amLODIPine   Tablet 5 milliGRAM(s) Oral daily  aspirin enteric coated 162 milliGRAM(s) Oral every 12 hours  atorvastatin 10 milliGRAM(s) Oral at bedtime  buPROPion XL . 300 milliGRAM(s) Oral daily  dextrose 5%. 1000 milliLiter(s) (50 mL/Hr) IV Continuous <Continuous>  dextrose 50% Injectable 12.5 Gram(s) IV Push once  dextrose 50% Injectable 25 Gram(s) IV Push once  dextrose 50% Injectable 25 Gram(s) IV Push once  docusate sodium 100 milliGRAM(s) Oral three times a day  insulin lispro (HumaLOG) corrective regimen sliding scale   SubCutaneous three times a day before meals  losartan 50 milliGRAM(s) Oral daily  metFORMIN 1000 milliGRAM(s) Oral two times a day  pantoprazole    Tablet 40 milliGRAM(s) Oral before breakfast  senna 2 Tablet(s) Oral at bedtime  silodosin 8 milliGRAM(s) Oral at bedtime  venlafaxine XR. 225 milliGRAM(s) Oral daily    MEDICATIONS  (PRN):  dextrose Gel 1 Dose(s) Oral once PRN Blood Glucose LESS THAN 70 milliGRAM(s)/deciliter  glucagon  Injectable 1 milliGRAM(s) IntraMuscular once PRN Glucose LESS THAN 70 milligrams/deciliter  HYDROmorphone  Injectable 0.5 milliGRAM(s) IV Push every 3 hours PRN Severe Pain (7 - 10)  magnesium hydroxide Suspension 30 milliLiter(s) Oral daily PRN Constipation  ondansetron Injectable 4 milliGRAM(s) IV Push every 6 hours PRN Nausea and/or Vomiting  promethazine IVPB 6.25 milliGRAM(s) IV Intermittent once PRN nausea/vomiting  tapentadol 50 milliGRAM(s) Oral every 4 hours PRN Moderate Pain (4 - 6)      Allergies    latex (Rash)  No Known Drug Allergies    Intolerances        Social History:  Alcohol: Denied  Smoking: Nonsmoker  Drug Use: Denied  Marital Status:     FAMILY HISTORY:  Family history of uterine cancer (Grandparent)  Family history of stomach cancer  Family history of uterine cancer (Mother)      Vital Signs Last 24 Hrs  T(C): 36.7 (08 May 2018 19:00), Max: 37.4 (08 May 2018 11:18)  T(F): 98 (08 May 2018 19:00), Max: 99.3 (08 May 2018 11:18)  HR: 81 (08 May 2018 19:00) (67 - 92)  BP: 127/66 (08 May 2018 19:00) (114/71 - 152/84)  BP(mean): --  RR: 16 (08 May 2018 19:00) (12 - 18)  SpO2: 95% (08 May 2018 19:00) (94% - 100%)    PHYSICAL EXAM:    Constitutional: NAD, well-developed  HEENT: SAMRA, EOMI, Normal Hearing, MMM  Neck: No LAD, No JVD  Back: Normal spine flexure, No CVA tenderness  Respiratory: CTAB   Cardiovascular: S1 and S2, RRR, no M/G/R  Abd: BS+, soft, NT/ND, No CVAT  : Normal phallus,open meatus,bilateral descended testes, no masses  AVA: Normal prostate, no masses  Extremities: No peripheral edema  Vascular: 2+ peripheral pulses  Neurological: A/O x 3, no focal deficits  Psychiatric: Normal mood, normal affect  Musculoskeletal: 5/5 strength b/l upper and lower extremities  Skin: No rashes    LABS:                        12.5   11.8  )-----------( 182      ( 08 May 2018 07:29 )             37.1     05-08    137  |  102  |  17  ----------------------------<  125<H>  4.7   |  27  |  0.77    Ca    8.9      08 May 2018 07:29          Urine Culture:   Blood Cultures      RADIOLOGY & ADDITIONAL STUDIES: CHIEF COMPLAINT:    HPI:  51 yo male w/ MS, NB, DM dev UR post R foot surgery. Pt had not taken rapaflo x 2d prior to surgery. Has been straight cath'd x 2    PAST MEDICAL & SURGICAL HISTORY:  BMI 39.0-39.9,adult  Obesity (BMI 30-39.9)  Ankle deformity, right  Neuropathy: bilateral hands  Bladder spasms  Varicose veins: BLE  Sleep apnea  Multiple sclerosis  Depression  Hypertension  Dyslipidemia  Type 2 diabetes mellitus  History of squamous cell carcinoma excision: right cheek, 2015  No significant past surgical history      REVIEW OF SYSTEMS:    CONSTITUTIONAL: No weakness, fevers or chills  EYES/ENT: No visual changes;  No vertigo or throat pain   NECK: No pain or stiffness  RESPIRATORY: No cough, wheezing, hemoptysis; No shortness of breath  CARDIOVASCULAR: No chest pain or palpitations  GASTROINTESTINAL: No abdominal or epigastric pain. No nausea, vomiting, or hematemesis; No diarrhea or constipation. No melena or hematochezia.  GENITOURINARY: No dysuria, frequency or hematuria  NEUROLOGICAL: No numbness or weakness  SKIN: No itching, burning, rashes, or lesions   All other review of systems is negative unless indicated above.    MEDICATIONS  (STANDING):  acetaminophen   Tablet. 1000 milliGRAM(s) Oral every 8 hours  amLODIPine   Tablet 5 milliGRAM(s) Oral daily  aspirin enteric coated 162 milliGRAM(s) Oral every 12 hours  atorvastatin 10 milliGRAM(s) Oral at bedtime  buPROPion XL . 300 milliGRAM(s) Oral daily  dextrose 5%. 1000 milliLiter(s) (50 mL/Hr) IV Continuous <Continuous>  dextrose 50% Injectable 12.5 Gram(s) IV Push once  dextrose 50% Injectable 25 Gram(s) IV Push once  dextrose 50% Injectable 25 Gram(s) IV Push once  docusate sodium 100 milliGRAM(s) Oral three times a day  insulin lispro (HumaLOG) corrective regimen sliding scale   SubCutaneous three times a day before meals  losartan 50 milliGRAM(s) Oral daily  metFORMIN 1000 milliGRAM(s) Oral two times a day  pantoprazole    Tablet 40 milliGRAM(s) Oral before breakfast  senna 2 Tablet(s) Oral at bedtime  silodosin 8 milliGRAM(s) Oral at bedtime  venlafaxine XR. 225 milliGRAM(s) Oral daily    MEDICATIONS  (PRN):  dextrose Gel 1 Dose(s) Oral once PRN Blood Glucose LESS THAN 70 milliGRAM(s)/deciliter  glucagon  Injectable 1 milliGRAM(s) IntraMuscular once PRN Glucose LESS THAN 70 milligrams/deciliter  HYDROmorphone  Injectable 0.5 milliGRAM(s) IV Push every 3 hours PRN Severe Pain (7 - 10)  magnesium hydroxide Suspension 30 milliLiter(s) Oral daily PRN Constipation  ondansetron Injectable 4 milliGRAM(s) IV Push every 6 hours PRN Nausea and/or Vomiting  promethazine IVPB 6.25 milliGRAM(s) IV Intermittent once PRN nausea/vomiting  tapentadol 50 milliGRAM(s) Oral every 4 hours PRN Moderate Pain (4 - 6)      Allergies    latex (Rash)  No Known Drug Allergies    Intolerances        Social History:  Alcohol: Denied  Smoking: Nonsmoker  Drug Use: Denied  Marital Status:     FAMILY HISTORY:  Family history of uterine cancer (Grandparent)  Family history of stomach cancer  Family history of uterine cancer (Mother)      Vital Signs Last 24 Hrs  T(C): 36.7 (08 May 2018 19:00), Max: 37.4 (08 May 2018 11:18)  T(F): 98 (08 May 2018 19:00), Max: 99.3 (08 May 2018 11:18)  HR: 81 (08 May 2018 19:00) (67 - 92)  BP: 127/66 (08 May 2018 19:00) (114/71 - 152/84)  BP(mean): --  RR: 16 (08 May 2018 19:00) (12 - 18)  SpO2: 95% (08 May 2018 19:00) (94% - 100%)    PHYSICAL EXAM:    Constitutional: NAD, well-developed  HEENT: SAMRA, EOMI, Normal Hearing  Neck: No LAD, No JVD  Back: Normal spine flexure, No CVA tenderness  Respiratory: CTAB   Cardiovascular: S1 and S2  Abd: BS+, soft, NT/ND, No CVAT  : Normal phallus,open meatus,bilateral descended testes, no masses  Extremities: No peripheral edema, bandage RLE  Vascular: 2+ peripheral pulses  Neurological: A/O x 3, no focal deficits  Psychiatric: Normal mood, normal affect  Musculoskeletal: 5/5 strength b/l upper and lower extremities  Skin: No rashes    LABS:                        12.5   11.8  )-----------( 182      ( 08 May 2018 07:29 )             37.1     05-08    137  |  102  |  17  ----------------------------<  125<H>  4.7   |  27  |  0.77    Ca    8.9      08 May 2018 07:29          Urine Culture:   Blood Cultures      RADIOLOGY & ADDITIONAL STUDIES:

## 2018-05-08 NOTE — DISCHARGE NOTE ADULT - HOSPITAL COURSE
REJI OLIVER    Admitted on 05-07-18     52y y.o.  Male with history of Primary osteoarthritis, right ankle and foot    Surgery:   Triple arthrodesis of right hindfoot    Orthopedic Surgeon:   Dr. DELMAR Clark    Lashanda-operative antibiotic:    ceFAZolin   IVPB:,       Consulted Services : Hospitalist, Physical Therapy, Occupational Therapy    Typical Physical & occupational therapy modalities post Triple arthrodesis of right hindfoot   were performed including ambulation training, range of motion, ADL's, and transfers.     DVT prophylaxis:  aspirin enteric coated: 162 milliGRAM(s)       The patient had a clean appearing surgical dressing/splint and had a stable neuro / vascular exam of the operated extremity.  After progression of mobility guided by the PT/ OT staff,  the patient was felt to benefit from further rehabilitative care for restoration to level of function. This was felt to best be accomplished at Home.    Discharge and Orthopedic Care instructions were delineated in the Discharge Plan and reviewed with the patient. All medications were delineated in the medication reconciliation tool and key points were reviewed with the patient.     This patient was deemed stable from an Orthopedic & medical standpoint for discharge today.  He will follow up with Dr. DELMAR Clark for further Orthopedic care.     Patient Discharged with Following prescriptions:    aspirin 81 mg oral delayed release tablet: 2 tab(s) orally every 12 hours  pantoprazole 40 mg oral delayed release tablet: 1 tab(s) orally once a day (before a meal)

## 2018-05-08 NOTE — OCCUPATIONAL THERAPY INITIAL EVALUATION ADULT - ADDITIONAL COMMENTS
Pt lives in a split level house with 2 steps with handrail and + threshold to enter front door. 5 steps with handrail to bedroom, den and bathroom. Pt has a bathtub with 3 grab bars. Pt owns a RW and tub bench. Pt reports that his spouse will be home with him for 2 weeks upon d/c home. Pt drives a car.

## 2018-05-08 NOTE — PROGRESS NOTE ADULT - SUBJECTIVE AND OBJECTIVE BOX
Called to see patient with complaint of severe foot/ankle POD#1 after triple arthrodesis of right foot ankle.  Pa in increased after PT/OT.  Pressure, burning "tearing" in nature.  Nucynta helped for a short period earlier.     Splint intact .  Toes cool, sensate and mobile.  + pedal pulse. + capillary refill.    Wrap on splint reapplied more loosely, foot and leg elevated above heart level.  Tylenol given.    Patient states pain is modestly decreased.     Decrease time interval of nucynta from q6 hours to q4 hours.  Emphasize elevation of affected extremity. Continue to monitor pain and neruovasc function.

## 2018-05-08 NOTE — PHYSICAL THERAPY INITIAL EVALUATION ADULT - BALANCE TRAINING, PT EVAL
Patient will improve dynamic standing balance by one half grade in 1 week Patient will improve dynamic standing balance by one half grade in 1-3 days

## 2018-05-08 NOTE — DISCHARGE NOTE ADULT - MEDICATION SUMMARY - MEDICATIONS TO TAKE
I will START or STAY ON the medications listed below when I get home from the hospital:    Rolling Walker  -- Dx: s/p Right foot triple arthrodesis  -- Indication: For Ambulation assistance    3:1 Commode  -- Dx: Right foot triple arthrodesis  -- Indication: For Toileting    tapentadol 50 mg oral tablet  -- 2 tab(s) by mouth every 4 hours, As Needed -mild- Moderate Pain (4 - 6) MDD:8  DO NOT TAKE WITH DILAUDID  -- Indication: For Pain    Dilaudid 2 mg oral tablet  -- 2 tab(s) by mouth once (at bedtime) MDD:2  NOT TO BE TAKEN WITH NUCYNTA  -- Caution federal law prohibits the transfer of this drug to any person other  than the person for whom it was prescribed.  May cause drowsiness.  Alcohol may intensify this effect.  Use care when operating dangerous machinery.  This prescription cannot be refilled.  Using more of this medication than prescribed may cause serious breathing problems.    -- Indication: For Pain    acetaminophen 500 mg oral tablet  -- 2 tab(s) by mouth every 8 hours  -- Indication: For Pain    aspirin 81 mg oral delayed release tablet  -- 2 tab(s) by mouth every 12 hours  -- Indication: For Prevention of blood clots    Benicar 20 mg oral tablet  -- 1 tab(s) by mouth once a day  -- Indication: For High blood pressure    Rapaflo 8 mg oral capsule  -- 1 cap(s) by mouth once a day  -- Indication: For enlarged prostate    venlafaxine 225 mg oral tablet, extended release  -- 1 tab(s) by mouth once a day  -- Indication: For Antidepressant    metFORMIN 1000 mg oral tablet  -- 1 tab(s) by mouth 2 times a day  -- Indication: For diabetes    Invokana 300 mg oral tablet  -- 1 tab(s) by mouth once a day  -- Indication: For diabetes    Victoza 18 mg/3 mL subcutaneous solution  -- Indication: For diabetes    atorvastatin 10 mg oral tablet  -- 1 tab(s) by mouth once a day  -- Indication: For High cholesterol    amLODIPine 5 mg oral tablet  -- 1 tab(s) by mouth once a day  -- Indication: For High blood pressure    Tysabri 300 mg/15 mL intravenous concentrate  -- every 5 weeks  -- Indication: For Multiple sclerosis    senna oral tablet  -- 2 tab(s) by mouth once a day (at bedtime)  -- Indication: For constipation    docusate sodium 100 mg oral capsule  -- 1 cap(s) by mouth 3 times a day  -- Indication: For Stool softener    pantoprazole 40 mg oral delayed release tablet  -- 1 tab(s) by mouth once a day (before a meal)  -- Indication: For Prevention of ulcers    Wellbutrin  mg/24 hours oral tablet, extended release  -- 1 tab(s) by mouth every 24 hours  -- Indication: For depression    Vitamin D3 2000 intl units oral capsule  -- 1 cap(s) by mouth once a day  -- Indication: For Supplement

## 2018-05-08 NOTE — DISCHARGE NOTE ADULT - INSTRUCTIONS
Accuchecks before meals and at bedtime  Hypoglycemic/Hyperglycemic protocol as per PMD  Continue Consistent Carbohydrate diet right foot- splint - ace wrap

## 2018-05-08 NOTE — DISCHARGE NOTE ADULT - ADDITIONAL INSTRUCTIONS
Follow up with Dr. Clark 2 weeks after surgery. Please call his office for an appointment.  It is advisable to follow up with your primary care provider within the next 2-3 weeks to ensure your medications are appropriate and there are no underlying problems after your procedure.

## 2018-05-08 NOTE — DISCHARGE NOTE ADULT - PLAN OF CARE
Improve quality of life Keep dressing/splint clean and dry.  Keep operative leg elevated  Mobilize toes every hour while awake  Do NOT bear weight on operative leg Call your doctor if you experience:  • An increase in pain not controlled by pain medication or change in activity or  position.  • Temperature greater than 101° F.  • Redness, increased swelling or foul smelling drainage from or around the  incision.  • Numbness, tingling or a change in color or temperature of the operative leg.  • Call your doctor immediately if you experience chest pain, shortness of breath or calf pain.    For Constipation :   • Increase your water intake. Drink at least 8 glasses of water daily.  • Try adding fiber to your diet by eating fruits, vegetables and foods that are rich in grains.  • If you do experience constipation, you may take an over-the-counter stool softener/laxative such as Colace, Senokot or Milk of Magnesia.

## 2018-05-08 NOTE — PHYSICAL THERAPY INITIAL EVALUATION ADULT - ADDITIONAL COMMENTS
Patient has 3 TONJA, 2 up to a porch and 1 to get into the house. Split level house with 5 steps to second floor where bathroom and bedroom is located. Patient has RW, knee scooter, axillary crutches, and is borrowing a WC. Patient has 3 TONJA, 2 up to a porch and 1 to get into the house. Split level house with 5 steps to second floor where bathroom and bedroom is located. Patient has RW, knee scooter, axillary crutches, and is borrowing a WC. Patient drives. Patient has 3 TONJA with HR, 2 up to a porch and 1 to get into the house. Split level house with 5 steps to second floor where bathroom and bedroom is located. Patient has RW, knee scooter, axillary crutches, and is borrowing a WC. Patient drives.

## 2018-05-08 NOTE — OCCUPATIONAL THERAPY INITIAL EVALUATION ADULT - PATIENT/FAMILY/SIGNIFICANT OTHER GOALS STATEMENT, OT EVAL
Pt. would like to go home upon d/c from [facility] today. Pt reports that his spouse will assist him at home.

## 2018-05-08 NOTE — PROGRESS NOTE ADULT - SUBJECTIVE AND OBJECTIVE BOX
PULMONARY/CRITICAL CARE      INTERVAL HPI/OVERNIGHT EVENTS:    52y MaleHPI: Doing well. some ankle pain. Used O2 at nite.  Had some urinary retention--has neurogenic bladder.        PAST MEDICAL & SURGICAL HISTORY:  BMI 39.0-39.9,adult  Obesity (BMI 30-39.9)  Ankle deformity, right  Neuropathy: bilateral hands  Bladder spasms  Varicose veins: BLE  Sleep apnea  Multiple sclerosis  Depression  Hypertension  Dyslipidemia  Type 2 diabetes mellitus  History of squamous cell carcinoma excision: right cheek, 2015  No significant past surgical history        ICU Vital Signs Last 24 Hrs  T(C): 36.9 (08 May 2018 03:00), Max: 36.9 (07 May 2018 18:11)  T(F): 98.4 (08 May 2018 03:00), Max: 98.4 (07 May 2018 18:11)  HR: 75 (08 May 2018 05:55) (73 - 99)  BP: 118/72 (08 May 2018 05:55) (114/71 - 152/50)  BP(mean): --  ABP: --  ABP(mean): --  RR: 16 (08 May 2018 03:00) (8 - 18)  SpO2: 98% (08 May 2018 03:00) (94% - 100%)    Qtts:     I&O's Summary    07 May 2018 07:01  -  08 May 2018 07:00  --------------------------------------------------------  IN: 2200 mL / OUT: 1725 mL / NET: 475 mL            REVIEW OF SYSTEMS:    CONSTITUTIONAL: No fever, weight loss, or fatigue  EYES: No eye pain, visual disturbances, or discharge  ENMT:  No difficulty hearing, tinnitus, vertigo; No sinus or throat pain  NECK: No pain or stiffness  BREASTS: No pain, masses, or nipple discharge  RESPIRATORY: No cough, wheezing, chills or hemoptysis; No shortness of breath  CARDIOVASCULAR: No chest pain, palpitations, dizziness, or leg swelling  GASTROINTESTINAL: No abdominal or epigastric pain. No nausea, vomiting, or hematemesis; No diarrhea or constipation. No melena or hematochezia.  GENITOURINARY: No dysuria, frequency, hematuria, or incontinence  NEUROLOGICAL: No headaches, memory loss, loss of strength, numbness, or tremors  SKIN: No itching, burning, rashes, or lesions   LYMPH NODES: No enlarged glands  ENDOCRINE: No heat or cold intolerance; No hair loss  MUSCULOSKELETAL: right ankle joint pain.; No muscle, back, or extremity pain, no calf tenderness  PSYCHIATRIC: No depression, anxiety, mood swings, or difficulty sleeping  HEME/LYMPH: No easy bruising, or bleeding gums  ALLERGY AND IMMUNOLOGIC: No hives or eczema      PHYSICAL EXAM:    GENERAL: NAD, well-groomed, well-developed, NAD  HEAD:  Atraumatic, Normocephalic  EYES: EOMI, PERRLA, conjunctiva and sclera clear  ENMT: No tonsillar erythema, exudates, or enlargement; Moist mucous membranes, Good dentition, No lesions  NECK: Supple, No JVD, Normal thyroid  NERVOUS SYSTEM:  Alert & Oriented X3, Good concentration; Motor Strength 5/5 B/L upper and lower extremities  CHEST/LUNG: Clear to percussion bilaterally; No rales, rhonchi, wheezing, or rubs  HEART: Regular rate and rhythm; No murmurs, rubs, or gallops  ABDOMEN: Soft, Nontender, Nondistended; Bowel sounds present  EXTREMITIES:  2+ Peripheral Pulses, No clubbing, cyanosis, or edema  Right ankle bandaged.  LYMPH: No lymphadenopathy noted  SKIN: No rashes or lesions        LABS:                vanco through     RADIOLOGY & ADDITIONAL STUDIES:      CRITICAL CARE TIME SPENT:

## 2018-05-08 NOTE — DISCHARGE NOTE ADULT - PATIENT PORTAL LINK FT
You can access the [facility] Portal, offered by [facility], by registering with the following website: [email or web address]

## 2018-05-08 NOTE — CONSULT NOTE ADULT - ASSESSMENT
52 male    1. primary severe osteoarthritis of joint s/p triple arthrodesis right foot: opiates prn + bowel regimen.   Physical Therapy evaluation.    2. Obesity contributing to JERRELL: CPAP as per Dr. Kingston.    3. Type 2 DM controlled: metformin + moderate SSI    4. MS: monitor respiratory and ambulation status closely.    5. HTN: Blood pressure meds with hold parameters    6. BPH: home meds. monitor intake and output.     7. dvt prophylaxis per orthopedic protocol     8. dispo: home in 1-2 days.
Pt stable postop right ankle surgery with hx aydin on autopap
51 yo male w/ MS, NB, DM dev UR post R foot surgery.     Resume rapaflo.  Place harman if unable to void post 8hrs. May go home w/ kimani  f/u w/ Dr Katheryn VIERA as outpt for TOV

## 2018-05-08 NOTE — DISCHARGE NOTE ADULT - CARE PLAN
Principal Discharge DX:	S/P ankle arthrodesis  Goal:	Improve quality of life  Assessment and plan of treatment:	Keep dressing/splint clean and dry.  Keep operative leg elevated  Mobilize toes every hour while awake  Do NOT bear weight on operative leg  Assessment and plan of treatment:	Call your doctor if you experience:  • An increase in pain not controlled by pain medication or change in activity or  position.  • Temperature greater than 101° F.  • Redness, increased swelling or foul smelling drainage from or around the  incision.  • Numbness, tingling or a change in color or temperature of the operative leg.  • Call your doctor immediately if you experience chest pain, shortness of breath or calf pain.    For Constipation :   • Increase your water intake. Drink at least 8 glasses of water daily.  • Try adding fiber to your diet by eating fruits, vegetables and foods that are rich in grains.  • If you do experience constipation, you may take an over-the-counter stool softener/laxative such as Colace, Senokot or Milk of Magnesia.

## 2018-05-08 NOTE — PHYSICAL THERAPY INITIAL EVALUATION ADULT - ASR EQUIP NEEDS DISCH PT EVAL
Patient has RW, knee scooter, axillary crutches,, borrowed WC bedside commode/rolling walker (5 inch wheels)/Patient has RW, knee scooter, axillary crutches,, borrowed WC

## 2018-05-09 VITALS
SYSTOLIC BLOOD PRESSURE: 130 MMHG | RESPIRATION RATE: 18 BRPM | DIASTOLIC BLOOD PRESSURE: 73 MMHG | TEMPERATURE: 98 F | OXYGEN SATURATION: 95 % | HEART RATE: 74 BPM

## 2018-05-09 LAB
ANION GAP SERPL CALC-SCNC: 6 MMOL/L — SIGNIFICANT CHANGE UP (ref 5–17)
BASOPHILS # BLD AUTO: 0.2 K/UL — SIGNIFICANT CHANGE UP (ref 0–0.2)
BASOPHILS NFR BLD AUTO: 1.5 % — SIGNIFICANT CHANGE UP (ref 0–2)
BUN SERPL-MCNC: 19 MG/DL — SIGNIFICANT CHANGE UP (ref 7–23)
CALCIUM SERPL-MCNC: 9.1 MG/DL — SIGNIFICANT CHANGE UP (ref 8.4–10.5)
CHLORIDE SERPL-SCNC: 105 MMOL/L — SIGNIFICANT CHANGE UP (ref 96–108)
CO2 SERPL-SCNC: 28 MMOL/L — SIGNIFICANT CHANGE UP (ref 22–31)
CREAT SERPL-MCNC: 0.92 MG/DL — SIGNIFICANT CHANGE UP (ref 0.5–1.3)
EOSINOPHIL # BLD AUTO: 0.4 K/UL — SIGNIFICANT CHANGE UP (ref 0–0.5)
EOSINOPHIL NFR BLD AUTO: 3.8 % — SIGNIFICANT CHANGE UP (ref 0–6)
GLUCOSE SERPL-MCNC: 148 MG/DL — HIGH (ref 70–99)
HCT VFR BLD CALC: 34.4 % — LOW (ref 39–50)
HGB BLD-MCNC: 11.7 G/DL — LOW (ref 13–17)
LYMPHOCYTES # BLD AUTO: 4.5 K/UL — HIGH (ref 1–3.3)
LYMPHOCYTES # BLD AUTO: 40.1 % — SIGNIFICANT CHANGE UP (ref 13–44)
MCHC RBC-ENTMCNC: 29.1 PG — SIGNIFICANT CHANGE UP (ref 27–34)
MCHC RBC-ENTMCNC: 34 GM/DL — SIGNIFICANT CHANGE UP (ref 32–36)
MCV RBC AUTO: 85.7 FL — SIGNIFICANT CHANGE UP (ref 80–100)
MONOCYTES # BLD AUTO: 0.9 K/UL — SIGNIFICANT CHANGE UP (ref 0–0.9)
MONOCYTES NFR BLD AUTO: 8 % — SIGNIFICANT CHANGE UP (ref 2–14)
NEUTROPHILS # BLD AUTO: 5.2 K/UL — SIGNIFICANT CHANGE UP (ref 1.8–7.4)
NEUTROPHILS NFR BLD AUTO: 46.5 % — SIGNIFICANT CHANGE UP (ref 43–77)
PLATELET # BLD AUTO: 151 K/UL — SIGNIFICANT CHANGE UP (ref 150–400)
POTASSIUM SERPL-MCNC: 4.9 MMOL/L — SIGNIFICANT CHANGE UP (ref 3.5–5.3)
POTASSIUM SERPL-SCNC: 4.9 MMOL/L — SIGNIFICANT CHANGE UP (ref 3.5–5.3)
RBC # BLD: 4.01 M/UL — LOW (ref 4.2–5.8)
RBC # FLD: 14.7 % — HIGH (ref 10.3–14.5)
SODIUM SERPL-SCNC: 139 MMOL/L — SIGNIFICANT CHANGE UP (ref 135–145)
WBC # BLD: 11.2 K/UL — HIGH (ref 3.8–10.5)
WBC # FLD AUTO: 11.2 K/UL — HIGH (ref 3.8–10.5)

## 2018-05-09 PROCEDURE — 97535 SELF CARE MNGMENT TRAINING: CPT

## 2018-05-09 PROCEDURE — 36415 COLL VENOUS BLD VENIPUNCTURE: CPT

## 2018-05-09 PROCEDURE — C1713: CPT

## 2018-05-09 PROCEDURE — 97530 THERAPEUTIC ACTIVITIES: CPT

## 2018-05-09 PROCEDURE — 97110 THERAPEUTIC EXERCISES: CPT

## 2018-05-09 PROCEDURE — 85027 COMPLETE CBC AUTOMATED: CPT

## 2018-05-09 PROCEDURE — 99239 HOSP IP/OBS DSCHRG MGMT >30: CPT

## 2018-05-09 PROCEDURE — 88311 DECALCIFY TISSUE: CPT

## 2018-05-09 PROCEDURE — C1889: CPT

## 2018-05-09 PROCEDURE — 80048 BASIC METABOLIC PNL TOTAL CA: CPT

## 2018-05-09 PROCEDURE — 97116 GAIT TRAINING THERAPY: CPT

## 2018-05-09 PROCEDURE — 76000 FLUOROSCOPY <1 HR PHYS/QHP: CPT

## 2018-05-09 PROCEDURE — 82962 GLUCOSE BLOOD TEST: CPT

## 2018-05-09 PROCEDURE — 88305 TISSUE EXAM BY PATHOLOGIST: CPT

## 2018-05-09 PROCEDURE — 94660 CPAP INITIATION&MGMT: CPT

## 2018-05-09 PROCEDURE — 97165 OT EVAL LOW COMPLEX 30 MIN: CPT

## 2018-05-09 PROCEDURE — 97161 PT EVAL LOW COMPLEX 20 MIN: CPT

## 2018-05-09 RX ORDER — HYDROMORPHONE HYDROCHLORIDE 2 MG/ML
2 INJECTION INTRAMUSCULAR; INTRAVENOUS; SUBCUTANEOUS
Qty: 14 | Refills: 0 | OUTPATIENT
Start: 2018-05-09 | End: 2018-05-15

## 2018-05-09 RX ORDER — TAPENTADOL HYDROCHLORIDE 50 MG/1
2 TABLET, FILM COATED ORAL
Qty: 56 | Refills: 0 | OUTPATIENT
Start: 2018-05-09 | End: 2018-05-15

## 2018-05-09 RX ORDER — TAPENTADOL HYDROCHLORIDE 50 MG/1
2 TABLET, FILM COATED ORAL
Qty: 56 | Refills: 0
Start: 2018-05-09 | End: 2018-05-15

## 2018-05-09 RX ORDER — HYDROMORPHONE HYDROCHLORIDE 2 MG/ML
2 INJECTION INTRAMUSCULAR; INTRAVENOUS; SUBCUTANEOUS
Qty: 14 | Refills: 0
Start: 2018-05-09 | End: 2018-05-15

## 2018-05-09 RX ADMIN — Medication 1000 MILLIGRAM(S): at 00:40

## 2018-05-09 RX ADMIN — Medication 1000 MILLIGRAM(S): at 08:07

## 2018-05-09 RX ADMIN — Medication 1000 MILLIGRAM(S): at 08:12

## 2018-05-09 RX ADMIN — PANTOPRAZOLE SODIUM 40 MILLIGRAM(S): 20 TABLET, DELAYED RELEASE ORAL at 05:13

## 2018-05-09 RX ADMIN — Medication 162 MILLIGRAM(S): at 08:07

## 2018-05-09 RX ADMIN — METFORMIN HYDROCHLORIDE 1000 MILLIGRAM(S): 850 TABLET ORAL at 08:07

## 2018-05-09 RX ADMIN — TAPENTADOL HYDROCHLORIDE 50 MILLIGRAM(S): 50 TABLET, FILM COATED ORAL at 05:13

## 2018-05-09 RX ADMIN — Medication 225 MILLIGRAM(S): at 11:06

## 2018-05-09 RX ADMIN — HYDROMORPHONE HYDROCHLORIDE 0.5 MILLIGRAM(S): 2 INJECTION INTRAMUSCULAR; INTRAVENOUS; SUBCUTANEOUS at 00:00

## 2018-05-09 RX ADMIN — BUPROPION HYDROCHLORIDE 300 MILLIGRAM(S): 150 TABLET, EXTENDED RELEASE ORAL at 11:06

## 2018-05-09 RX ADMIN — Medication 100 MILLIGRAM(S): at 05:13

## 2018-05-09 RX ADMIN — TAPENTADOL HYDROCHLORIDE 50 MILLIGRAM(S): 50 TABLET, FILM COATED ORAL at 06:00

## 2018-05-09 RX ADMIN — TAPENTADOL HYDROCHLORIDE 50 MILLIGRAM(S): 50 TABLET, FILM COATED ORAL at 11:06

## 2018-05-09 NOTE — PROGRESS NOTE ADULT - ASSESSMENT
52 male    1. primary severe osteoarthritis of joint s/p triple arthrodesis right foot: opiates prn + bowel regimen.   Physical Therapy evaluation.    2. Obesity contributing to JERRELL: CPAP as per Dr. Kingston.    3. Type 2 DM controlled: metformin + moderate SSI    4. MS: home meds on discharge    5. HTN: Blood pressure meds with hold parameters    6. BPH with urinary retention: home meds. harman in place. TOV as per Dr. Campa in his office later this week.    7. dvt prophylaxis per orthopedic protocol     8. dispo: home today.  d/w wife at bedside.

## 2018-05-09 NOTE — PROGRESS NOTE ADULT - SUBJECTIVE AND OBJECTIVE BOX
Patient is a 52y old  Male who presents with a chief complaint of right foot surgery (08 May 2018 10:05)      Subjective: feels well. pain tolerable   good appetite.  participating in PT.   kimani for urinary retention    MEDICATIONS  (STANDING):  acetaminophen   Tablet. 1000 milliGRAM(s) Oral every 8 hours  amLODIPine   Tablet 5 milliGRAM(s) Oral daily  aspirin enteric coated 162 milliGRAM(s) Oral every 12 hours  atorvastatin 10 milliGRAM(s) Oral at bedtime  buPROPion XL . 300 milliGRAM(s) Oral daily  dextrose 5%. 1000 milliLiter(s) (50 mL/Hr) IV Continuous <Continuous>  dextrose 50% Injectable 12.5 Gram(s) IV Push once  dextrose 50% Injectable 25 Gram(s) IV Push once  dextrose 50% Injectable 25 Gram(s) IV Push once  docusate sodium 100 milliGRAM(s) Oral three times a day  insulin lispro (HumaLOG) corrective regimen sliding scale   SubCutaneous three times a day before meals  losartan 50 milliGRAM(s) Oral daily  metFORMIN 1000 milliGRAM(s) Oral two times a day  pantoprazole    Tablet 40 milliGRAM(s) Oral before breakfast  senna 2 Tablet(s) Oral at bedtime  silodosin 8 milliGRAM(s) Oral at bedtime  venlafaxine XR. 225 milliGRAM(s) Oral daily    MEDICATIONS  (PRN):  bisacodyl Suppository 10 milliGRAM(s) Rectal daily PRN If no bowel movement  dextrose Gel 1 Dose(s) Oral once PRN Blood Glucose LESS THAN 70 milliGRAM(s)/deciliter  glucagon  Injectable 1 milliGRAM(s) IntraMuscular once PRN Glucose LESS THAN 70 milligrams/deciliter  HYDROmorphone  Injectable 0.5 milliGRAM(s) IV Push every 3 hours PRN Severe Pain (7 - 10)  magnesium hydroxide Suspension 30 milliLiter(s) Oral daily PRN Constipation  ondansetron Injectable 4 milliGRAM(s) IV Push every 6 hours PRN Nausea and/or Vomiting  promethazine IVPB 6.25 milliGRAM(s) IV Intermittent once PRN nausea/vomiting  tapentadol 50 milliGRAM(s) Oral every 4 hours PRN Moderate Pain (4 - 6)      Allergies    latex (Rash)  No Known Drug Allergies    Intolerances        REVIEW OF SYSTEMS:  negative unless otherwise specified above.    Vital Signs Last 24 Hrs  T(C): 36.8 (09 May 2018 07:15), Max: 37.4 (08 May 2018 11:18)  T(F): 98.3 (09 May 2018 07:15), Max: 99.3 (08 May 2018 11:18)  HR: 58 (09 May 2018 07:15) (53 - 92)  BP: 135/75 (09 May 2018 07:15) (106/65 - 152/84)  BP(mean): --  RR: 16 (09 May 2018 07:15) (16 - 18)  SpO2: 95% (09 May 2018 07:15) (95% - 98%)    PHYSICAL EXAM:  GENERAL: No apparent distress  HEAD:  Atraumatic, Normocephalic  EYES: conjunctiva and sclera clear  ENMT: Moist mucous membranes  NECK: Supple  CHEST/LUNG: Clear to auscultation bilaterally  HEART: Regular rate and rhythm  ABDOMEN: Soft, Nontender, Nondistended; Bowel sounds present, OBESE  EXTREMITIES:  No clubbing, cyanosis or edema  SKIN: No rashes or lesions  NERVOUS SYSTEM:  Alert & Oriented X3; Bilateral Lower extremity mobile, sensation to light touch intact  INCISION:  Dressing dry and intact        LABS:   Hemoglobin: 11.7 g/dL <L> (05-09 @ 06:58)  WBC Count: 11.2 K/uL <H> (05-09 @ 06:58)  Platelet Count - Automated: 151 K/uL (05-09 @ 06:58)  Hematocrit: 34.4 % <L> (05-09 @ 06:58)  RBC Count: 4.01 M/uL <L> (05-09 @ 06:58)      05-09    139  |  105  |  19  ----------------------------<  148<H>  4.9   |  28  |  0.92    Ca    9.1      09 May 2018 06:58                                        IMAGING: images reviewed personally      Consultant Notes Reviewed and Care Discussed with relevant Consultants/Other Providers.

## 2018-05-15 ENCOUNTER — APPOINTMENT (OUTPATIENT)
Dept: ORTHOPEDIC SURGERY | Facility: CLINIC | Age: 53
End: 2018-05-15
Payer: COMMERCIAL

## 2018-05-15 VITALS
HEART RATE: 69 BPM | BODY MASS INDEX: 39.37 KG/M2 | DIASTOLIC BLOOD PRESSURE: 80 MMHG | SYSTOLIC BLOOD PRESSURE: 133 MMHG | HEIGHT: 66 IN | WEIGHT: 245 LBS

## 2018-05-15 PROCEDURE — 99024 POSTOP FOLLOW-UP VISIT: CPT

## 2018-05-15 PROCEDURE — 73630 X-RAY EXAM OF FOOT: CPT | Mod: RT

## 2018-05-29 ENCOUNTER — APPOINTMENT (OUTPATIENT)
Dept: ORTHOPEDIC SURGERY | Facility: CLINIC | Age: 53
End: 2018-05-29
Payer: COMMERCIAL

## 2018-05-29 PROCEDURE — 29425 APPL SHORT LEG CAST WALKING: CPT | Mod: 58,RT

## 2018-05-29 PROCEDURE — 73630 X-RAY EXAM OF FOOT: CPT | Mod: RT

## 2018-05-29 PROCEDURE — 99024 POSTOP FOLLOW-UP VISIT: CPT

## 2018-06-19 ENCOUNTER — APPOINTMENT (OUTPATIENT)
Dept: ORTHOPEDIC SURGERY | Facility: CLINIC | Age: 53
End: 2018-06-19
Payer: COMMERCIAL

## 2018-06-19 VITALS — SYSTOLIC BLOOD PRESSURE: 145 MMHG | DIASTOLIC BLOOD PRESSURE: 80 MMHG | HEART RATE: 83 BPM

## 2018-06-19 DIAGNOSIS — M19.071 PRIMARY OSTEOARTHRITIS, RIGHT ANKLE AND FOOT: ICD-10-CM

## 2018-06-19 PROCEDURE — 99024 POSTOP FOLLOW-UP VISIT: CPT

## 2018-06-19 PROCEDURE — 73630 X-RAY EXAM OF FOOT: CPT | Mod: RT

## 2018-07-31 ENCOUNTER — APPOINTMENT (OUTPATIENT)
Dept: ORTHOPEDIC SURGERY | Facility: CLINIC | Age: 53
End: 2018-07-31
Payer: COMMERCIAL

## 2018-07-31 VITALS
WEIGHT: 245 LBS | HEIGHT: 66 IN | SYSTOLIC BLOOD PRESSURE: 111 MMHG | BODY MASS INDEX: 39.37 KG/M2 | HEART RATE: 64 BPM | DIASTOLIC BLOOD PRESSURE: 65 MMHG

## 2018-07-31 PROBLEM — M21.961 UNSPECIFIED ACQUIRED DEFORMITY OF RIGHT LOWER LEG: Chronic | Status: ACTIVE | Noted: 2018-04-17

## 2018-07-31 PROBLEM — E78.5 HYPERLIPIDEMIA, UNSPECIFIED: Chronic | Status: ACTIVE | Noted: 2018-04-17

## 2018-07-31 PROBLEM — E11.9 TYPE 2 DIABETES MELLITUS WITHOUT COMPLICATIONS: Chronic | Status: ACTIVE | Noted: 2018-04-17

## 2018-07-31 PROBLEM — N32.89 OTHER SPECIFIED DISORDERS OF BLADDER: Chronic | Status: ACTIVE | Noted: 2018-04-17

## 2018-07-31 PROBLEM — I83.90 ASYMPTOMATIC VARICOSE VEINS OF UNSPECIFIED LOWER EXTREMITY: Chronic | Status: ACTIVE | Noted: 2018-04-17

## 2018-07-31 PROBLEM — I10 ESSENTIAL (PRIMARY) HYPERTENSION: Chronic | Status: ACTIVE | Noted: 2018-04-17

## 2018-07-31 PROBLEM — F32.9 MAJOR DEPRESSIVE DISORDER, SINGLE EPISODE, UNSPECIFIED: Chronic | Status: ACTIVE | Noted: 2018-04-17

## 2018-07-31 PROBLEM — G35 MULTIPLE SCLEROSIS: Chronic | Status: ACTIVE | Noted: 2018-04-17

## 2018-07-31 PROBLEM — G62.9 POLYNEUROPATHY, UNSPECIFIED: Chronic | Status: ACTIVE | Noted: 2018-04-17

## 2018-07-31 PROBLEM — Z98.890 OTHER SPECIFIED POSTPROCEDURAL STATES: Chronic | Status: ACTIVE | Noted: 2018-04-17

## 2018-07-31 PROBLEM — E66.9 OBESITY, UNSPECIFIED: Chronic | Status: ACTIVE | Noted: 2018-04-17

## 2018-07-31 PROCEDURE — 99024 POSTOP FOLLOW-UP VISIT: CPT

## 2018-07-31 PROCEDURE — 73630 X-RAY EXAM OF FOOT: CPT | Mod: RT

## 2018-08-30 ENCOUNTER — TRANSCRIPTION ENCOUNTER (OUTPATIENT)
Age: 53
End: 2018-08-30

## 2018-08-30 ENCOUNTER — CHART COPY (OUTPATIENT)
Age: 53
End: 2018-08-30

## 2018-09-18 ENCOUNTER — APPOINTMENT (OUTPATIENT)
Dept: ORTHOPEDIC SURGERY | Facility: CLINIC | Age: 53
End: 2018-09-18
Payer: COMMERCIAL

## 2018-09-18 VITALS
WEIGHT: 245 LBS | DIASTOLIC BLOOD PRESSURE: 81 MMHG | SYSTOLIC BLOOD PRESSURE: 123 MMHG | HEART RATE: 66 BPM | HEIGHT: 66 IN | BODY MASS INDEX: 39.37 KG/M2

## 2018-09-18 PROCEDURE — 99213 OFFICE O/P EST LOW 20 MIN: CPT

## 2018-09-18 PROCEDURE — 73630 X-RAY EXAM OF FOOT: CPT | Mod: RT

## 2018-12-18 ENCOUNTER — APPOINTMENT (OUTPATIENT)
Dept: ORTHOPEDIC SURGERY | Facility: CLINIC | Age: 53
End: 2018-12-18
Payer: COMMERCIAL

## 2018-12-18 VITALS — SYSTOLIC BLOOD PRESSURE: 138 MMHG | DIASTOLIC BLOOD PRESSURE: 77 MMHG | HEART RATE: 62 BPM

## 2018-12-18 PROCEDURE — 99213 OFFICE O/P EST LOW 20 MIN: CPT

## 2018-12-18 PROCEDURE — 73630 X-RAY EXAM OF FOOT: CPT | Mod: RT

## 2019-01-22 ENCOUNTER — RX RENEWAL (OUTPATIENT)
Age: 54
End: 2019-01-22

## 2019-03-13 ENCOUNTER — TRANSCRIPTION ENCOUNTER (OUTPATIENT)
Age: 54
End: 2019-03-13

## 2019-04-02 ENCOUNTER — APPOINTMENT (OUTPATIENT)
Dept: INTERNAL MEDICINE | Facility: CLINIC | Age: 54
End: 2019-04-02
Payer: COMMERCIAL

## 2019-04-02 VITALS
BODY MASS INDEX: 38.57 KG/M2 | HEART RATE: 70 BPM | WEIGHT: 240 LBS | RESPIRATION RATE: 16 BRPM | DIASTOLIC BLOOD PRESSURE: 78 MMHG | SYSTOLIC BLOOD PRESSURE: 134 MMHG | HEIGHT: 66 IN

## 2019-04-02 DIAGNOSIS — Z86.39 PERSONAL HISTORY OF OTHER ENDOCRINE, NUTRITIONAL AND METABOLIC DISEASE: ICD-10-CM

## 2019-04-02 PROCEDURE — 36415 COLL VENOUS BLD VENIPUNCTURE: CPT

## 2019-04-02 PROCEDURE — 99214 OFFICE O/P EST MOD 30 MIN: CPT | Mod: 25

## 2019-04-02 RX ORDER — NATALIZUMAB 300 MG/15ML
INJECTION INTRAVENOUS
Refills: 0 | Status: DISCONTINUED | COMMUNITY
End: 2019-04-02

## 2019-04-02 NOTE — PHYSICAL EXAM
[No Acute Distress] : no acute distress [No Lymphadenopathy] : no lymphadenopathy [No Respiratory Distress] : no respiratory distress  [Clear to Auscultation] : lungs were clear to auscultation bilaterally [Normal Rate] : normal rate  [Regular Rhythm] : with a regular rhythm

## 2019-04-02 NOTE — HISTORY OF PRESENT ILLNESS
[de-identified] : Had cpx 10/18--wt down 5 lbs--seeing gu,neuro, optho-needs to switch  from invokana to jardiance-script sent

## 2019-04-03 LAB
ANION GAP SERPL CALC-SCNC: 12 MMOL/L
BUN SERPL-MCNC: 19 MG/DL
CALCIUM SERPL-MCNC: 9.6 MG/DL
CHLORIDE SERPL-SCNC: 107 MMOL/L
CHOLEST SERPL-MCNC: 170 MG/DL
CHOLEST/HDLC SERPL: 3.3 RATIO
CO2 SERPL-SCNC: 25 MMOL/L
CREAT SERPL-MCNC: 0.87 MG/DL
ESTIMATED AVERAGE GLUCOSE: 163 MG/DL
GLUCOSE SERPL-MCNC: 134 MG/DL
HBA1C MFR BLD HPLC: 7.3 %
HDLC SERPL-MCNC: 52 MG/DL
LDLC SERPL CALC-MCNC: 100 MG/DL
POTASSIUM SERPL-SCNC: 5.4 MMOL/L
SODIUM SERPL-SCNC: 144 MMOL/L
TRIGL SERPL-MCNC: 90 MG/DL

## 2019-04-19 ENCOUNTER — TRANSCRIPTION ENCOUNTER (OUTPATIENT)
Age: 54
End: 2019-04-19

## 2019-04-27 ENCOUNTER — RX RENEWAL (OUTPATIENT)
Age: 54
End: 2019-04-27

## 2019-05-01 ENCOUNTER — TRANSCRIPTION ENCOUNTER (OUTPATIENT)
Age: 54
End: 2019-05-01

## 2019-05-07 ENCOUNTER — APPOINTMENT (OUTPATIENT)
Dept: ORTHOPEDIC SURGERY | Facility: CLINIC | Age: 54
End: 2019-05-07
Payer: COMMERCIAL

## 2019-05-07 VITALS — HEART RATE: 61 BPM | SYSTOLIC BLOOD PRESSURE: 138 MMHG | DIASTOLIC BLOOD PRESSURE: 81 MMHG

## 2019-05-07 DIAGNOSIS — Z98.890 OTHER SPECIFIED POSTPROCEDURAL STATES: ICD-10-CM

## 2019-05-07 PROCEDURE — 99213 OFFICE O/P EST LOW 20 MIN: CPT

## 2019-05-07 PROCEDURE — 73630 X-RAY EXAM OF FOOT: CPT | Mod: RT

## 2019-05-07 RX ORDER — DICLOFENAC SODIUM 10 MG/G
1 GEL TOPICAL TWICE DAILY
Qty: 1 | Refills: 2 | Status: ACTIVE | COMMUNITY
Start: 2019-05-07 | End: 1900-01-01

## 2019-05-09 ENCOUNTER — CHART COPY (OUTPATIENT)
Age: 54
End: 2019-05-09

## 2019-05-09 ENCOUNTER — RX RENEWAL (OUTPATIENT)
Age: 54
End: 2019-05-09

## 2019-05-09 RX ORDER — DICLOFENAC SODIUM 10 MG/G
1 GEL TOPICAL
Qty: 1 | Refills: 4 | Status: ACTIVE | COMMUNITY
Start: 2019-05-09 | End: 1900-01-01

## 2019-06-05 ENCOUNTER — RX RENEWAL (OUTPATIENT)
Age: 54
End: 2019-06-05

## 2019-06-05 ENCOUNTER — APPOINTMENT (OUTPATIENT)
Dept: INTERNAL MEDICINE | Facility: CLINIC | Age: 54
End: 2019-06-05
Payer: COMMERCIAL

## 2019-06-05 VITALS
HEIGHT: 66 IN | SYSTOLIC BLOOD PRESSURE: 126 MMHG | DIASTOLIC BLOOD PRESSURE: 86 MMHG | WEIGHT: 240 LBS | RESPIRATION RATE: 16 BRPM | BODY MASS INDEX: 38.57 KG/M2

## 2019-06-05 PROCEDURE — 99213 OFFICE O/P EST LOW 20 MIN: CPT

## 2019-06-05 RX ORDER — CANAGLIFLOZIN 300 MG/1
TABLET, FILM COATED ORAL
Refills: 0 | Status: DISCONTINUED | COMMUNITY
End: 2019-06-05

## 2019-06-05 NOTE — HEALTH RISK ASSESSMENT
[Patient reported colonoscopy was normal] : Patient reported colonoscopy was normal [ColonoscopyDate] : 02/19 [ColonoscopyComments] : ewelina

## 2019-06-05 NOTE — ASSESSMENT
[FreeTextEntry1] : pt advised of options--change to basal insulin, add another rxn ?sulfonourea--on amaryl prev without benefit or conting current rxn and f/u with cpx/labs 10/19--wt loss critical

## 2019-06-05 NOTE — HISTORY OF PRESENT ILLNESS
[FreeTextEntry1] : f/u diabetis [de-identified] : changed to jardiance 25mg due to form. change about 3 months ago--recent hba1c 7.3--on max victoza daily and metformin 1000 bid--hba1c's have been 6-8 over past 1-2 yrs

## 2019-06-05 NOTE — PHYSICAL EXAM
[No Acute Distress] : no acute distress [No Respiratory Distress] : no respiratory distress  [Normal Rate] : normal rate  [Regular Rhythm] : with a regular rhythm [Normal S1, S2] : normal S1 and S2 [Soft] : abdomen soft

## 2019-06-24 ENCOUNTER — APPOINTMENT (OUTPATIENT)
Dept: PULMONOLOGY | Facility: CLINIC | Age: 54
End: 2019-06-24
Payer: COMMERCIAL

## 2019-06-24 VITALS
HEIGHT: 66 IN | HEART RATE: 72 BPM | OXYGEN SATURATION: 97 % | DIASTOLIC BLOOD PRESSURE: 80 MMHG | WEIGHT: 246 LBS | RESPIRATION RATE: 17 BRPM | BODY MASS INDEX: 39.53 KG/M2 | SYSTOLIC BLOOD PRESSURE: 120 MMHG

## 2019-06-24 DIAGNOSIS — Z82.69 FAMILY HISTORY OF OTHER DISEASES OF THE MUSCULOSKELETAL SYSTEM AND CONNECTIVE TISSUE: ICD-10-CM

## 2019-06-24 DIAGNOSIS — Z80.49 FAMILY HISTORY OF MALIGNANT NEOPLASM OF OTHER GENITAL ORGANS: ICD-10-CM

## 2019-06-24 PROCEDURE — 94618 PULMONARY STRESS TESTING: CPT

## 2019-06-24 PROCEDURE — 94060 EVALUATION OF WHEEZING: CPT

## 2019-06-24 PROCEDURE — 71046 X-RAY EXAM CHEST 2 VIEWS: CPT

## 2019-06-24 PROCEDURE — 94729 DIFFUSING CAPACITY: CPT

## 2019-06-24 PROCEDURE — 99204 OFFICE O/P NEW MOD 45 MIN: CPT | Mod: 25

## 2019-06-24 PROCEDURE — 94727 GAS DIL/WSHOT DETER LNG VOL: CPT

## 2019-06-24 NOTE — REVIEW OF SYSTEMS
[Recent Wt Loss (___ Lbs)] : recent [unfilled] ~Ulb weight loss [Negative] : Sleep Disorder [As Noted in HPI] : as noted in HPI [Nocturia] : nocturia

## 2019-06-24 NOTE — HISTORY OF PRESENT ILLNESS
[FreeTextEntry1] : Mr. OLIVER is a 53 year old male presenting to the office today for initial pulmonary evaluation. His chief complaint is difficulty with his CPAP.\par -he reports using  a CPAP machine for the past 10-12 years\par -he was told to use Diloted after his foot surgery, and was told he didn't have to use the CPAP machine anymore\par -he notes having difficulty setting up an appointment for a sleep study\par -he reports if he doesn't use the CPAP, he doesn’t have as many cognitive issues, including memory and concentration\par -he reports having dyspnea at night while his nose is stuffed, but no issues when using the CPAP\par -he reports waking up with nocturia 1-2x nightly\par -he is unsure what symptoms are caused by MS or apnea. He notes he has cognitive issues \par -he reports having lost 50 pounds intentionally\par -he reports having [place] upon more exertion\par -he denies any SOB while on his back or at night\par -he notes his sinuses are clear with use of Zyrtec\par -he reports having had brain MRIs for a few years at every 3 months, and every time there was sinus inflammation noted\par -he notes he has a neck size of 18\par he reports his senses of smell and taste are good \par -he notes his weight when prescribed the CPAP machine was 290 lbs, and is currently 245 lbs\par -he currently using an only nasal mask (Mirage)\par -he -he denies any chest pain, chest pressure, diarrhea, constipation, dysphagia, dizziness, heartburn, reflux, sour taste in the mouth.

## 2019-06-24 NOTE — PHYSICAL EXAM
[General Appearance - Well Developed] : well developed [Normal Appearance] : normal appearance [Well Groomed] : well groomed [No Deformities] : no deformities [General Appearance - Well Nourished] : well nourished [General Appearance - In No Acute Distress] : no acute distress [Normal Conjunctiva] : the conjunctiva exhibited no abnormalities [Eyelids - No Xanthelasma] : the eyelids demonstrated no xanthelasmas [Normal Oropharynx] : normal oropharynx [II] : II [Neck Appearance] : the appearance of the neck was normal [Neck Cervical Mass (___cm)] : no neck mass was observed [Jugular Venous Distention Increased] : there was no jugular-venous distention [Thyroid Diffuse Enlargement] : the thyroid was not enlarged [Thyroid Nodule] : there were no palpable thyroid nodules [Heart Rate And Rhythm] : heart rate and rhythm were normal [Heart Sounds] : normal S1 and S2 [Murmurs] : no murmurs present [Respiration, Rhythm And Depth] : normal respiratory rhythm and effort [Exaggerated Use Of Accessory Muscles For Inspiration] : no accessory muscle use [Auscultation Breath Sounds / Voice Sounds] : lungs were clear to auscultation bilaterally [Abdomen Soft] : soft [Abdomen Tenderness] : non-tender [Abdomen Mass (___ Cm)] : no abdominal mass palpated [Abnormal Walk] : normal gait [Gait - Sufficient For Exercise Testing] : the gait was sufficient for exercise testing [Nail Clubbing] : no clubbing of the fingernails [Cyanosis, Localized] : no localized cyanosis [Petechial Hemorrhages (___cm)] : no petechial hemorrhages [Skin Color & Pigmentation] : normal skin color and pigmentation [Skin Turgor] : normal skin turgor [] : no rash [Deep Tendon Reflexes (DTR)] : deep tendon reflexes were 2+ and symmetric [No Focal Deficits] : no focal deficits [Sensation] : the sensory exam was normal to light touch and pinprick [Oriented To Time, Place, And Person] : oriented to person, place, and time [Impaired Insight] : insight and judgment were intact [Affect] : the affect was normal [FreeTextEntry1] : I:E ratio 1:3; clear

## 2019-06-24 NOTE — ADDENDUM
[FreeTextEntry1] : Documented by Dae Laurent acting as a scribe for Dr. Pancho Byrne on 06/24/2019.\par \par All medical record entries made by the Scribe were at my, Dr. Pancho Byrne's, direction and personally dictated by me on 06/24/2019. I have reviewed the chart and agree that the record accurately reflects my personal performance of the history, physical exam, assessment and plan. I have also personally directed, reviewed, and agree with the discharge instructions.

## 2019-06-24 NOTE — PROCEDURE
[FreeTextEntry1] : CXR reveals a normal sized heart; no evidence of infiltrate or effusion--a normal appearing chest radiograph \par \par Full PFT revealed normal flows, with a FEV1 of 3.92L, which is 112% of predicted, with a 15% increase with use of bronchodilator at mid-low lung volumes, normal lung volumes, and a diffusion of 27.4, which is 96% of predicted, with a normal flow volume loop\par \par 6 minute walk test reveals a low saturation of 96% with very slight dyspnea; walked 456.4 meters

## 2019-06-24 NOTE — ASSESSMENT
[FreeTextEntry1] : Mr. OLIVER is a 53 year old male with a history of HLD, HTN, DM, MS, JERRELL, and allergies who comes into the office today for pulmonary evaluation\par \par The patient's shortness of breath is multifactorial due to:\par -pulmonary disease \par      -has risk factors for asthma (allergy)\par -poor breathing mechanics \par -overweight/out of shape\par -CAD \par \par Problem 1: Allergy / Sinus\par -Continue using Zyrtec\par -Environmental measures for allergies were encouraged including mattress and pillow cover, air purifier, and environmental controls. \par \par Problem 2: JERRELL\par -recommended to complete split night sleep study for memory concentration, nocturia, daily fatigue, metabolism.\par \par -Sleep apnea is associated with adverse clinical consequences which an affect most organ systems. Cardiovascular disease risk includes arrhythmias, atrial fibrillation, hypertension, coronary artery disease, and stroke. Metabolic disorders include diabetes type 2, non-alcoholic fatty liver disease. Mood disorder especially depression; and cognitive decline especially in the elderly. Associations with chronic reflux/Morris’s esophagus some but not all inclusive. \par -Reasons include arousal consistent with hypopnea; respiratory events most prominent in REM sleep or supine position; therefore sleep staging and body position are important for accurate diagnosis and estimation of AHI. \par \par Problem 3: Cardiac\par -Recommended to follow up with \par \par Problem 4: Poor Mechanics of Breathing\par - Proper breathing techniques were reviewed with an emphasis of exhalation. Patient instructed to breath in for 1 second and out for four seconds. Patient was encouraged to not talk while walking. \par \par Problem 5: health maintenance\par -s/p influenza vaccine\par -recommended strep pneumonia vaccines: Prevnar-13 vaccine, followed by Pneumo vaccine 23 one year following\par -recommended early intervention for URIs\par -recommended regular osteoporosis evaluations\par -recommended early dermatological evaluations\par -recommended after the age of 50 to the age of 70, colonoscopy every 5 years \par \par \par  Follow up in 6-8 weeks\par -he  is recommended to call with any changes, questions, or concerns. \par \par

## 2019-07-07 PROBLEM — M21.41 ACQUIRED RIGHT FLAT FOOT: Status: ACTIVE | Noted: 2018-01-16

## 2019-08-10 ENCOUNTER — RECORD ABSTRACTING (OUTPATIENT)
Age: 54
End: 2019-08-10

## 2019-08-17 ENCOUNTER — OUTPATIENT (OUTPATIENT)
Dept: OUTPATIENT SERVICES | Facility: HOSPITAL | Age: 54
LOS: 1 days | End: 2019-08-17
Payer: COMMERCIAL

## 2019-08-17 ENCOUNTER — APPOINTMENT (OUTPATIENT)
Dept: SLEEP CENTER | Facility: CLINIC | Age: 54
End: 2019-08-17
Payer: COMMERCIAL

## 2019-08-17 PROCEDURE — 95811 POLYSOM 6/>YRS CPAP 4/> PARM: CPT | Mod: 26

## 2019-08-17 PROCEDURE — 95811 POLYSOM 6/>YRS CPAP 4/> PARM: CPT

## 2019-08-19 DIAGNOSIS — G47.33 OBSTRUCTIVE SLEEP APNEA (ADULT) (PEDIATRIC): ICD-10-CM

## 2019-09-13 ENCOUNTER — NON-APPOINTMENT (OUTPATIENT)
Age: 54
End: 2019-09-13

## 2019-09-13 ENCOUNTER — APPOINTMENT (OUTPATIENT)
Dept: PULMONOLOGY | Facility: CLINIC | Age: 54
End: 2019-09-13
Payer: COMMERCIAL

## 2019-09-13 VITALS
SYSTOLIC BLOOD PRESSURE: 130 MMHG | WEIGHT: 244 LBS | BODY MASS INDEX: 41.66 KG/M2 | HEIGHT: 64 IN | RESPIRATION RATE: 16 BRPM | HEART RATE: 75 BPM | OXYGEN SATURATION: 97 % | DIASTOLIC BLOOD PRESSURE: 82 MMHG

## 2019-09-13 PROCEDURE — 94010 BREATHING CAPACITY TEST: CPT

## 2019-09-13 PROCEDURE — 99214 OFFICE O/P EST MOD 30 MIN: CPT | Mod: 25

## 2019-09-13 PROCEDURE — 95012 NITRIC OXIDE EXP GAS DETER: CPT

## 2019-09-13 NOTE — PROCEDURE
[FreeTextEntry1] : PFT revealed normal flows, with a FEV1 of 3.93L, which is 129% of predicted, with a normal flow volume loop\par \par FENO was 25; a normal value being less than 25\par Fractional exhaled nitric oxide (FENO) is regarded as a simple, noninvasive method for assessing eosinophilic airway inflammation. Produced by a variety of cells within the lung, nitric oxide (NO) concentrations are generally low in healthy individuals. However, high concentrations of NO appear to be involved in nonspecific host defense mechanisms and chronic inflammatory diseases such as asthma. The American Thoracic Society (ATS) therefore has recommended using FENO to aid in the diagnosis and monitoring of eosinophilic airway inflammation and asthma, and for identifying steroid responsive individuals whose chronic respiratory symptoms may be caused by airway inflammation. \par \par Sleep study (8.17.19) revealed sleep apnea with an AHI/AUBREY of 13.5, and a low oxygen saturation of 78%

## 2019-09-13 NOTE — ADDENDUM
[FreeTextEntry1] : Documented by Dae Laurent acting as a scribe for Dr. Pancho Byrne on 09/13/2019.\par \par All medical record entries made by the Scribe were at my, Dr. Pancho Byrne's, direction and personally dictated by me on 09/13/2019. I have reviewed the chart and agree that the record accurately reflects my personal performance of the history, physical exam, assessment and plan. I have also personally directed, reviewed, and agree with the discharge instructions.

## 2019-09-13 NOTE — REVIEW OF SYSTEMS
[Constipation] : constipation [As Noted in HPI] : as noted in HPI [Negative] : Pulmonary Hypertension [Chest Tightness] : no chest tightness [Chest Discomfort] : no chest discomfort [Dysphagia] : no dysphagia [Snoring] : no snoring [Diarrhea] : no diarrhea

## 2019-09-13 NOTE — HISTORY OF PRESENT ILLNESS
[FreeTextEntry1] : Mr. OLIVER is a 53 year old male with a history of obesity, post nasal drip, and SOB, presenting to the office today for a follow up pulmonary evaluation. His chief complaint is constipation\par -he reports feeling generally well\par -he reports having constant constipation\par -he notes having recently had a URI, that went to his chest\par -he reports his energy level is 4/10 this week, but is usually a 7-8/10\par -he states he has been using his CPAP regularly (an AutoPAP) - he notes he uses a full nasal pillow (not a nostril pillow)\par -he reports his senses of vision, smell and taste are good \par -he denies any snoring, chest pain, chest pressure, diarrhea, dysphagia, dizziness, sour taste in the mouth

## 2019-09-13 NOTE — ASSESSMENT
[FreeTextEntry1] : Mr. OLIVER is a 53 year old male with a history of HLD, HTN, DM, MS, JERRELL, and allergies who comes into the office today for a follow up pulmonary evaluation\par \par The patient's shortness of breath is multifactorial due to:\par -pulmonary disease \par      -has risk factors for asthma (allergy)\par -poor breathing mechanics \par -overweight/out of shape\par -CAD \par \par Problem 1: Allergy / Sinus\par -Continue using Zyrtec\par -Environmental measures for allergies were encouraged including mattress and pillow cover, air purifier, and environmental controls. \par \par Problem 2: JERRELL\par -s/p split night sleep study for memory concentration, nocturia, daily fatigue, metabolism.\par -Will transition to Airfit N30 full nasal mask, with 8 cm pressure ([facility])\par \par -Sleep apnea is associated with adverse clinical consequences which an affect most organ systems. Cardiovascular disease risk includes arrhythmias, atrial fibrillation, hypertension, coronary artery disease, and stroke. Metabolic disorders include diabetes type 2, non-alcoholic fatty liver disease. Mood disorder especially depression; and cognitive decline especially in the elderly. Associations with chronic reflux/Morris’s esophagus some but not all inclusive. \par -Reasons include arousal consistent with hypopnea; respiratory events most prominent in REM sleep or supine position; therefore sleep staging and body position are important for accurate diagnosis and estimation of AHI. \par \par Problem 3: Cardiac\par -Recommended to follow up with \par \par Problem 4: Poor Mechanics of Breathing\par - Proper breathing techniques were reviewed with an emphasis of exhalation. Patient instructed to breath in for 1 second and out for four seconds. Patient was encouraged to not talk while walking. \par \par Problem 5: health maintenance\par -s/p influenza vaccine\par -recommended strep pneumonia vaccines: Prevnar-13 vaccine, followed by Pneumo vaccine 23 one year following\par -recommended early intervention for URIs\par -recommended regular osteoporosis evaluations\par -recommended early dermatological evaluations\par -recommended after the age of 50 to the age of 70, colonoscopy every 5 years \par \par \par  Follow up in 6-8 weeks\par -he  is recommended to call with any changes, questions, or concerns. \par \par

## 2019-09-13 NOTE — PHYSICAL EXAM
[Normal Appearance] : normal appearance [General Appearance - Well Developed] : well developed [Well Groomed] : well groomed [General Appearance - Well Nourished] : well nourished [No Deformities] : no deformities [General Appearance - In No Acute Distress] : no acute distress [Normal Conjunctiva] : the conjunctiva exhibited no abnormalities [Normal Oropharynx] : normal oropharynx [Eyelids - No Xanthelasma] : the eyelids demonstrated no xanthelasmas [II] : II [Neck Appearance] : the appearance of the neck was normal [Neck Cervical Mass (___cm)] : no neck mass was observed [Thyroid Diffuse Enlargement] : the thyroid was not enlarged [Jugular Venous Distention Increased] : there was no jugular-venous distention [Thyroid Nodule] : there were no palpable thyroid nodules [Heart Rate And Rhythm] : heart rate and rhythm were normal [Murmurs] : no murmurs present [Heart Sounds] : normal S1 and S2 [Respiration, Rhythm And Depth] : normal respiratory rhythm and effort [Auscultation Breath Sounds / Voice Sounds] : lungs were clear to auscultation bilaterally [Exaggerated Use Of Accessory Muscles For Inspiration] : no accessory muscle use [Abdomen Tenderness] : non-tender [Abdomen Soft] : soft [Abdomen Mass (___ Cm)] : no abdominal mass palpated [Gait - Sufficient For Exercise Testing] : the gait was sufficient for exercise testing [Abnormal Walk] : normal gait [Nail Clubbing] : no clubbing of the fingernails [Cyanosis, Localized] : no localized cyanosis [Petechial Hemorrhages (___cm)] : no petechial hemorrhages [Sensation] : the sensory exam was normal to light touch and pinprick [Deep Tendon Reflexes (DTR)] : deep tendon reflexes were 2+ and symmetric [No Focal Deficits] : no focal deficits [Impaired Insight] : insight and judgment were intact [Oriented To Time, Place, And Person] : oriented to person, place, and time [Skin Turgor] : normal skin turgor [Affect] : the affect was normal [] : no rash [Skin Color & Pigmentation] : normal skin color and pigmentation [No Venous Stasis] : no venous stasis [No Skin Ulcers] : no skin ulcer [Skin Lesions] : no skin lesions [No Xanthoma] : no  xanthoma was observed [FreeTextEntry1] : overweight

## 2019-09-16 ENCOUNTER — NON-APPOINTMENT (OUTPATIENT)
Age: 54
End: 2019-09-16

## 2019-09-16 ENCOUNTER — APPOINTMENT (OUTPATIENT)
Dept: INTERNAL MEDICINE | Facility: CLINIC | Age: 54
End: 2019-09-16
Payer: COMMERCIAL

## 2019-09-16 VITALS
HEIGHT: 66 IN | WEIGHT: 240 LBS | DIASTOLIC BLOOD PRESSURE: 80 MMHG | TEMPERATURE: 97.4 F | SYSTOLIC BLOOD PRESSURE: 132 MMHG | OXYGEN SATURATION: 99 % | HEART RATE: 74 BPM | BODY MASS INDEX: 38.57 KG/M2 | RESPIRATION RATE: 16 BRPM

## 2019-09-16 PROCEDURE — G0444 DEPRESSION SCREEN ANNUAL: CPT

## 2019-09-16 PROCEDURE — 90686 IIV4 VACC NO PRSV 0.5 ML IM: CPT

## 2019-09-16 PROCEDURE — 36415 COLL VENOUS BLD VENIPUNCTURE: CPT

## 2019-09-16 PROCEDURE — 99396 PREV VISIT EST AGE 40-64: CPT | Mod: 25

## 2019-09-16 PROCEDURE — 90471 IMMUNIZATION ADMIN: CPT

## 2019-09-16 PROCEDURE — 93000 ELECTROCARDIOGRAM COMPLETE: CPT

## 2019-09-16 RX ORDER — VENLAFAXINE HYDROCHLORIDE 150 MG/1
CAPSULE, EXTENDED RELEASE ORAL
Refills: 0 | Status: ACTIVE | COMMUNITY

## 2019-09-16 RX ORDER — VENLAFAXINE HYDROCHLORIDE 225 MG/1
225 TABLET, EXTENDED RELEASE ORAL
Refills: 0 | Status: ACTIVE | COMMUNITY

## 2019-09-16 RX ORDER — OLMESARTAN MEDOXOMIL 40 MG/1
TABLET, FILM COATED ORAL
Refills: 0 | Status: DISCONTINUED | COMMUNITY
End: 2019-09-16

## 2019-09-16 RX ORDER — OLMESARTAN MEDOXOMIL 20 MG/1
20 TABLET, FILM COATED ORAL
Qty: 90 | Refills: 1 | Status: DISCONTINUED | COMMUNITY
Start: 2019-04-27 | End: 2019-09-16

## 2019-09-16 NOTE — PHYSICAL EXAM
[No Acute Distress] : no acute distress [Well Nourished] : well nourished [Well Developed] : well developed [Normal Sclera/Conjunctiva] : normal sclera/conjunctiva [Well-Appearing] : well-appearing [PERRL] : pupils equal round and reactive to light [EOMI] : extraocular movements intact [Normal Outer Ear/Nose] : the outer ears and nose were normal in appearance [Normal Oropharynx] : the oropharynx was normal [No Lymphadenopathy] : no lymphadenopathy [No JVD] : no jugular venous distention [Supple] : supple [Thyroid Normal, No Nodules] : the thyroid was normal and there were no nodules present [No Respiratory Distress] : no respiratory distress  [No Accessory Muscle Use] : no accessory muscle use [Clear to Auscultation] : lungs were clear to auscultation bilaterally [Normal Rate] : normal rate  [Regular Rhythm] : with a regular rhythm [Normal S1, S2] : normal S1 and S2 [No Murmur] : no murmur heard [No Abdominal Bruit] : a ~M bruit was not heard ~T in the abdomen [No Carotid Bruits] : no carotid bruits [No Varicosities] : no varicosities [Pedal Pulses Present] : the pedal pulses are present [No Edema] : there was no peripheral edema [No Extremity Clubbing/Cyanosis] : no extremity clubbing/cyanosis [No Palpable Aorta] : no palpable aorta [Soft] : abdomen soft [Non Tender] : non-tender [Non-distended] : non-distended [No Masses] : no abdominal mass palpated [No HSM] : no HSM [Normal Bowel Sounds] : normal bowel sounds [Declined Rectal Exam] : declined rectal exam [Normal Posterior Cervical Nodes] : no posterior cervical lymphadenopathy [Normal Anterior Cervical Nodes] : no anterior cervical lymphadenopathy [No CVA Tenderness] : no CVA  tenderness [No Spinal Tenderness] : no spinal tenderness [No Joint Swelling] : no joint swelling [Grossly Normal Strength/Tone] : grossly normal strength/tone [No Rash] : no rash [Coordination Grossly Intact] : coordination grossly intact [No Focal Deficits] : no focal deficits [Normal Gait] : normal gait [Normal Affect] : the affect was normal [Deep Tendon Reflexes (DTR)] : deep tendon reflexes were 2+ and symmetric [Normal Insight/Judgement] : insight and judgment were intact [FreeTextEntry1] : per

## 2019-09-16 NOTE — HISTORY OF PRESENT ILLNESS
[FreeTextEntry1] : cpx [de-identified] : for cpx --reviewed pulm notes--seeing  (autumn), neuro (pallavi), ortho (agustin)- and psychiatry (dolores)-had cardiac w/u ss heart-has some hand numbness and cog impairments pt relates to his MS--has not checked glu recently

## 2019-09-16 NOTE — HEALTH RISK ASSESSMENT
[No] : No [No falls in past year] : Patient reported no falls in the past year [0] : 2) Feeling down, depressed, or hopeless: Not at all (0) [No Retinopathy] : No retinopathy [Patient reported colonoscopy was normal] : Patient reported colonoscopy was normal [With Significant Other] : lives with significant other [] :  [Fully functional (bathing, dressing, toileting, transferring, walking, feeding)] : Fully functional (bathing, dressing, toileting, transferring, walking, feeding) [Fully functional (using the telephone, shopping, preparing meals, housekeeping, doing laundry, using] : Fully functional and needs no help or supervision to perform IADLs (using the telephone, shopping, preparing meals, housekeeping, doing laundry, using transportation, managing medications and managing finances) [Reports normal functional visual acuity (ie: able to read med bottle)] : Reports normal functional visual acuity [Smoke Detector] : smoke detector [Carbon Monoxide Detector] : carbon monoxide detector [Safety elements used in home] : safety elements used in home [Seat Belt] :  uses seat belt [Sunscreen] : uses sunscreen [With Patient/Caregiver] : With Patient/Caregiver [] : No [de-identified] : derm [EyeExamDate] : 4/19 [[id number]] : 0 [Change in mental status noted] : No change in mental status noted [Reports changes in hearing] : Reports no changes in hearing [Reports changes in vision] : Reports no changes in vision [Reports changes in dental health] : Reports no changes in dental health [Guns at Home] : no guns at home [TB Exposure] : is not being exposed to tuberculosis [Travel to Developing Areas] : does not  travel to developing areas [Caregiver Concerns] : does not have caregiver concerns [ColonoscopyDate] : 07/19 [ColonoscopyComments] : nikki theodore [AdvancecareDate] : 09/19

## 2019-09-17 LAB
ALBUMIN SERPL ELPH-MCNC: 4.8 G/DL
ALP BLD-CCNC: 77 U/L
ALT SERPL-CCNC: 36 U/L
ANION GAP SERPL CALC-SCNC: 12 MMOL/L
APPEARANCE: CLEAR
AST SERPL-CCNC: 23 U/L
BASOPHILS # BLD AUTO: 0.06 K/UL
BASOPHILS NFR BLD AUTO: 0.8 %
BILIRUB SERPL-MCNC: 0.3 MG/DL
BILIRUBIN URINE: NEGATIVE
BLOOD URINE: NEGATIVE
BUN SERPL-MCNC: 19 MG/DL
CALCIUM SERPL-MCNC: 9.8 MG/DL
CHLORIDE SERPL-SCNC: 104 MMOL/L
CHOLEST SERPL-MCNC: 142 MG/DL
CHOLEST/HDLC SERPL: 2.8 RATIO
CO2 SERPL-SCNC: 25 MMOL/L
COLOR: NORMAL
CREAT SERPL-MCNC: 0.85 MG/DL
CREAT SPEC-SCNC: 85 MG/DL
EOSINOPHIL # BLD AUTO: 0.3 K/UL
EOSINOPHIL NFR BLD AUTO: 4.1 %
ESTIMATED AVERAGE GLUCOSE: 163 MG/DL
GLUCOSE QUALITATIVE U: ABNORMAL
GLUCOSE SERPL-MCNC: 125 MG/DL
HBA1C MFR BLD HPLC: 7.3 %
HCT VFR BLD CALC: 54.5 %
HDLC SERPL-MCNC: 51 MG/DL
HGB BLD-MCNC: 16 G/DL
IMM GRANULOCYTES NFR BLD AUTO: 0.4 %
KETONES URINE: NEGATIVE
LDLC SERPL CALC-MCNC: 76 MG/DL
LEUKOCYTE ESTERASE URINE: NEGATIVE
LYMPHOCYTES # BLD AUTO: 2.9 K/UL
LYMPHOCYTES NFR BLD AUTO: 39.2 %
MAN DIFF?: NORMAL
MCHC RBC-ENTMCNC: 27.8 PG
MCHC RBC-ENTMCNC: 29.4 GM/DL
MCV RBC AUTO: 94.6 FL
MICROALBUMIN 24H UR DL<=1MG/L-MCNC: 2.8 MG/DL
MICROALBUMIN/CREAT 24H UR-RTO: 33 MG/G
MONOCYTES # BLD AUTO: 0.47 K/UL
MONOCYTES NFR BLD AUTO: 6.4 %
NEUTROPHILS # BLD AUTO: 3.64 K/UL
NEUTROPHILS NFR BLD AUTO: 49.1 %
NITRITE URINE: NEGATIVE
PH URINE: 5.5
PLATELET # BLD AUTO: 201 K/UL
POTASSIUM SERPL-SCNC: 4.8 MMOL/L
PROT SERPL-MCNC: 7.3 G/DL
PROTEIN URINE: NEGATIVE
RBC # BLD: 5.76 M/UL
RBC # FLD: 16.4 %
SODIUM SERPL-SCNC: 141 MMOL/L
SPECIFIC GRAVITY URINE: 1.04
TRIGL SERPL-MCNC: 76 MG/DL
TSH SERPL-ACNC: 2 UIU/ML
UROBILINOGEN URINE: NORMAL
WBC # FLD AUTO: 7.4 K/UL

## 2019-09-23 ENCOUNTER — RX RENEWAL (OUTPATIENT)
Age: 54
End: 2019-09-23

## 2019-11-08 ENCOUNTER — TRANSCRIPTION ENCOUNTER (OUTPATIENT)
Age: 54
End: 2019-11-08

## 2019-11-11 ENCOUNTER — TRANSCRIPTION ENCOUNTER (OUTPATIENT)
Age: 54
End: 2019-11-11

## 2019-11-14 ENCOUNTER — MEDICATION RENEWAL (OUTPATIENT)
Age: 54
End: 2019-11-14

## 2019-11-18 ENCOUNTER — MEDICATION RENEWAL (OUTPATIENT)
Age: 54
End: 2019-11-18

## 2019-11-22 ENCOUNTER — MEDICATION RENEWAL (OUTPATIENT)
Age: 54
End: 2019-11-22

## 2019-11-22 ENCOUNTER — TRANSCRIPTION ENCOUNTER (OUTPATIENT)
Age: 54
End: 2019-11-22

## 2019-12-11 ENCOUNTER — APPOINTMENT (OUTPATIENT)
Dept: PULMONOLOGY | Facility: CLINIC | Age: 54
End: 2019-12-11
Payer: COMMERCIAL

## 2019-12-11 ENCOUNTER — NON-APPOINTMENT (OUTPATIENT)
Age: 54
End: 2019-12-11

## 2019-12-11 VITALS
DIASTOLIC BLOOD PRESSURE: 80 MMHG | HEART RATE: 84 BPM | RESPIRATION RATE: 17 BRPM | HEIGHT: 66 IN | SYSTOLIC BLOOD PRESSURE: 132 MMHG | OXYGEN SATURATION: 98 % | WEIGHT: 243 LBS | BODY MASS INDEX: 39.05 KG/M2

## 2019-12-11 PROCEDURE — 99214 OFFICE O/P EST MOD 30 MIN: CPT | Mod: 25

## 2019-12-11 PROCEDURE — 95012 NITRIC OXIDE EXP GAS DETER: CPT

## 2019-12-11 PROCEDURE — 94010 BREATHING CAPACITY TEST: CPT

## 2019-12-11 NOTE — ADDENDUM
[FreeTextEntry1] : All medical record entries made by cammie Bowen were at Dr. Pancho Byrne's direction and personally dictated by me on 12/11/2019. I have reviewed the chart and agree that the record accurately reflects my personal performance of the history, physical exam, assessment and plan. I have also personally directed, reviewed, and agree with the discharge instructions.

## 2019-12-11 NOTE — PHYSICAL EXAM
[Normal Appearance] : normal appearance [Well Groomed] : well groomed [General Appearance - Well Developed] : well developed [No Deformities] : no deformities [General Appearance - Well Nourished] : well nourished [General Appearance - In No Acute Distress] : no acute distress [Normal Conjunctiva] : the conjunctiva exhibited no abnormalities [Eyelids - No Xanthelasma] : the eyelids demonstrated no xanthelasmas [Normal Oropharynx] : normal oropharynx [Neck Appearance] : the appearance of the neck was normal [Neck Cervical Mass (___cm)] : no neck mass was observed [Jugular Venous Distention Increased] : there was no jugular-venous distention [Thyroid Nodule] : there were no palpable thyroid nodules [Heart Rate And Rhythm] : heart rate and rhythm were normal [Thyroid Diffuse Enlargement] : the thyroid was not enlarged [Murmurs] : no murmurs present [Heart Sounds] : normal S1 and S2 [Respiration, Rhythm And Depth] : normal respiratory rhythm and effort [Exaggerated Use Of Accessory Muscles For Inspiration] : no accessory muscle use [Auscultation Breath Sounds / Voice Sounds] : lungs were clear to auscultation bilaterally [Abdomen Tenderness] : non-tender [Abdomen Soft] : soft [Abnormal Walk] : normal gait [Gait - Sufficient For Exercise Testing] : the gait was sufficient for exercise testing [Abdomen Mass (___ Cm)] : no abdominal mass palpated [Cyanosis, Localized] : no localized cyanosis [Nail Clubbing] : no clubbing of the fingernails [Petechial Hemorrhages (___cm)] : no petechial hemorrhages [Skin Lesions] : no skin lesions [No Venous Stasis] : no venous stasis [No Skin Ulcers] : no skin ulcer [Sensation] : the sensory exam was normal to light touch and pinprick [No Xanthoma] : no  xanthoma was observed [Deep Tendon Reflexes (DTR)] : deep tendon reflexes were 2+ and symmetric [No Focal Deficits] : no focal deficits [Oriented To Time, Place, And Person] : oriented to person, place, and time [Affect] : the affect was normal [Impaired Insight] : insight and judgment were intact [Skin Color & Pigmentation] : normal skin color and pigmentation [] : no rash [Skin Turgor] : normal skin turgor [III] : III [FreeTextEntry1] : I:E ratio 1:3; clear

## 2019-12-11 NOTE — PROCEDURE
[FreeTextEntry1] : PFT - spi reveals normal flows; FEV1 is 3.88 which is 120% of predicted, normal flow volume loop \par \par FENO was 31; a normal value being less than 25\par Fractional exhaled nitric oxide (FENO) is regarded as a simple, noninvasive method for assessing eosinophilic airway inflammation. Produced by a variety of cells within the lung, nitric oxide (NO) concentrations are generally low in healthy individuals. However, high concentrations of NO appear to be involved in nonspecific host defense mechanisms and chronic inflammatory diseases such as asthma. The American Thoracic Society (ATS) therefore has recommended using FENO to aid in the diagnosis and monitoring of eosinophilic airway inflammation and asthma, and for identifying steroid responsive individuals whose chronic respiratory symptoms may be caused by airway inflammation. \par \par Sleep study (August 17, 2019) reveals that AHI was in severe range prior to application of CPAP. The frequency of sleep disordered breathing events was reduced to near wnl with a CPAP pressure of 8 cmH2O.

## 2019-12-11 NOTE — HISTORY OF PRESENT ILLNESS
[FreeTextEntry1] : Mr. OLIVER is a 54 year old male with a history of obesity, post nasal drip, and SOB, presenting to the office today for a follow up pulmonary evaluation. His chief complaint is difficulty with CPAP face mask.\par -He states that he has generally been feeling well and has been tolerating the cold weather well from a pulmonary perspective \par -he has been using the Dream Wear face mask for his CPAP machine and finds it generally more comfortable than his previous mask, however it frequently becomes displaced during the night. he has been using his CPAP machine every night for about 6 hours per night. he has been waking up feeling well rested\par -he reports that he has had some acne due to his previous face masks\par -he has been walking for about 45-60 hours for exercise per day \par -he states that his bowels have been regular while on MiraLAX \par -he reports that his sense of taste and smell have been good \par -he has not been finding himself SOB, or SOB on his back/at night\par -he denies any headaches, nausea, vomiting, fever, chills, sweats, chest pain, chest pressure, diarrhea, constipation, dysphagia, dizziness, leg swelling, leg pain, itchy eyes, itchy ears, heartburn, reflux, or sour taste in the mouth, joint aches or pains, muscle aches or pains.

## 2019-12-11 NOTE — ASSESSMENT
[FreeTextEntry1] : Mr. OLIVER is a 54 year old male with a history of HLD, RADS, HTN, DM, MS, JERRELL, and allergies who comes into the office today for a follow up pulmonary evaluation - His number one issue is OSAS.\par \par The patient's shortness of breath is multifactorial due to:\par -pulmonary disease \par      -has risk factors for asthma (allergy)\par -poor breathing mechanics \par -overweight/out of shape\par -CAD \par \par Problem 1: Allergy / Sinus\par -Continue using Zyrtec 5 mg QHS \par -Environmental measures for allergies were encouraged including mattress and pillow cover, air purifier, and environmental controls. \par \par Problem 2: JERRELL\par -s/p split night sleep study for memory concentration, nocturia, daily fatigue, metabolism.\par -Will transition to Airfit N30 full nasal mask (medium), with 8 cm pressure (community), from the Dream Wear mask\par \par -Sleep apnea is associated with adverse clinical consequences which an affect most organ systems. Cardiovascular disease risk includes arrhythmias, atrial fibrillation, hypertension, coronary artery disease, and stroke. Metabolic disorders include diabetes type 2, non-alcoholic fatty liver disease. Mood disorder especially depression; and cognitive decline especially in the elderly. Associations with chronic reflux/Morris’s esophagus some but not all inclusive. \par -Reasons include arousal consistent with hypopnea; respiratory events most prominent in REM sleep or supine position; therefore sleep staging and body position are important for accurate diagnosis and estimation of AHI. \par \par Problem 3: Cardiac\par -Recommended to follow up with cardiologist\par \par Problem 4: Poor Mechanics of Breathing\par - Proper breathing techniques were reviewed with an emphasis of exhalation. Patient instructed to breath in for 1 second and out for four seconds. Patient was encouraged to not talk while walking. \par \par Problem 5: health maintenance\par -s/p influenza vaccine - 2019\par -recommended strep pneumonia vaccines: Prevnar-13 vaccine, followed by Pneumo vaccine 23 one year following\par -recommended early intervention for URIs\par -recommended regular osteoporosis evaluations\par -recommended early dermatological evaluations\par -recommended after the age of 50 to the age of 70, colonoscopy every 5 years \par \par \par  Follow up in 3 months - SPI\par -he  is recommended to call with any changes, questions, or concerns.

## 2019-12-11 NOTE — REVIEW OF SYSTEMS
[Constipation] : constipation [As Noted in HPI] : as noted in HPI [Negative] : Sleep Disorder [Dysphagia] : no dysphagia [Chest Tightness] : no chest tightness [Chest Discomfort] : no chest discomfort [Diarrhea] : no diarrhea [Snoring] : no snoring

## 2020-02-03 ENCOUNTER — RX RENEWAL (OUTPATIENT)
Age: 55
End: 2020-02-03

## 2020-02-10 ENCOUNTER — TRANSCRIPTION ENCOUNTER (OUTPATIENT)
Age: 55
End: 2020-02-10

## 2020-03-09 ENCOUNTER — APPOINTMENT (OUTPATIENT)
Dept: PULMONOLOGY | Facility: CLINIC | Age: 55
End: 2020-03-09
Payer: COMMERCIAL

## 2020-03-09 ENCOUNTER — NON-APPOINTMENT (OUTPATIENT)
Age: 55
End: 2020-03-09

## 2020-03-09 VITALS
BODY MASS INDEX: 38.92 KG/M2 | HEIGHT: 66 IN | OXYGEN SATURATION: 98 % | WEIGHT: 242.2 LBS | RESPIRATION RATE: 17 BRPM | HEART RATE: 102 BPM | SYSTOLIC BLOOD PRESSURE: 150 MMHG | DIASTOLIC BLOOD PRESSURE: 80 MMHG

## 2020-03-09 PROCEDURE — 99214 OFFICE O/P EST MOD 30 MIN: CPT | Mod: 25

## 2020-03-09 PROCEDURE — 94010 BREATHING CAPACITY TEST: CPT

## 2020-03-09 NOTE — PROCEDURE
[FreeTextEntry1] : PFT revealed normal flows, with a FEV1 of 3.95L, which is 122% of predicted, with a normal flow volume loop

## 2020-03-09 NOTE — HISTORY OF PRESENT ILLNESS
[FreeTextEntry1] : Mr. OLIVER is a 54 year old male with a history of obesity, post nasal drip, and SOB, presenting to the office today for a follow up pulmonary evaluation. His chief complaint is difficulty sleeping\par -he reports he is feeling well\par -he notes he is sleeping okay, and has some issues falling asleep due to his anxiety, but his breathing at night has been good\par -he reports he is exercising 5 times per day for 10 minutes each\par -he reports he has some SWE[place]Y\par -He notes His bowels are regular with use of miralax\par -he denies taking any new medications, vitamins, or supplements, but increased Effexor dosage\par -he notes his vision is good\par -he notes his sinuses are congested and drippy\par -he reports he is using the Resmed Fx wide mask \par -he reports having left shoulder pain\par -he denies any coughing, wheezing, orthopnea, SOB when walking at a brisk pace, chest pain, chest pressure, diarrhea, constipation, dysphagia, dizziness, sour taste in the mouth, heartburn, reflux

## 2020-03-09 NOTE — ADDENDUM
[FreeTextEntry1] : Documented by Dae Laurent acting as a scribe for Dr. Pancho Byrne on 03/09/2020.\par \par All medical record entries made by the Scribe were at my, Dr. Pancho Byrne's, direction and personally dictated by me on 03/09/2020. I have reviewed the chart and agree that the record accurately reflects my personal performance of the history, physical exam, assessment and plan. I have also personally directed, reviewed, and agree with the discharge instructions.

## 2020-03-09 NOTE — PHYSICAL EXAM
[General Appearance - Well Developed] : well developed [Normal Appearance] : normal appearance [Well Groomed] : well groomed [General Appearance - Well Nourished] : well nourished [No Deformities] : no deformities [General Appearance - In No Acute Distress] : no acute distress [Normal Conjunctiva] : the conjunctiva exhibited no abnormalities [Eyelids - No Xanthelasma] : the eyelids demonstrated no xanthelasmas [Normal Oropharynx] : normal oropharynx [Neck Appearance] : the appearance of the neck was normal [Neck Cervical Mass (___cm)] : no neck mass was observed [Jugular Venous Distention Increased] : there was no jugular-venous distention [Thyroid Diffuse Enlargement] : the thyroid was not enlarged [Thyroid Nodule] : there were no palpable thyroid nodules [Heart Rate And Rhythm] : heart rate and rhythm were normal [Heart Sounds] : normal S1 and S2 [Murmurs] : no murmurs present [Respiration, Rhythm And Depth] : normal respiratory rhythm and effort [Exaggerated Use Of Accessory Muscles For Inspiration] : no accessory muscle use [Auscultation Breath Sounds / Voice Sounds] : lungs were clear to auscultation bilaterally [Abdomen Soft] : soft [Abdomen Tenderness] : non-tender [Abdomen Mass (___ Cm)] : no abdominal mass palpated [Abnormal Walk] : normal gait [Gait - Sufficient For Exercise Testing] : the gait was sufficient for exercise testing [Nail Clubbing] : no clubbing of the fingernails [Cyanosis, Localized] : no localized cyanosis [Petechial Hemorrhages (___cm)] : no petechial hemorrhages [No Venous Stasis] : no venous stasis [Skin Lesions] : no skin lesions [No Skin Ulcers] : no skin ulcer [No Xanthoma] : no  xanthoma was observed [Deep Tendon Reflexes (DTR)] : deep tendon reflexes were 2+ and symmetric [Sensation] : the sensory exam was normal to light touch and pinprick [No Focal Deficits] : no focal deficits [Oriented To Time, Place, And Person] : oriented to person, place, and time [Impaired Insight] : insight and judgment were intact [Affect] : the affect was normal [Skin Color & Pigmentation] : normal skin color and pigmentation [Skin Turgor] : normal skin turgor [] : no rash [II] : II [FreeTextEntry1] : I:E ratio 1:3; clear

## 2020-03-09 NOTE — ASSESSMENT
[FreeTextEntry1] : Mr. OLIVER is a 54 year old male with a history of HLD, RADS, HTN, DM, MS, JERRELL, and allergies who comes into the office today for a follow up pulmonary evaluation - His number one issue is OSAS.\par \par The patient's shortness of breath is multifactorial due to:\par -pulmonary disease \par      -has risk factors for asthma (allergy)\par -poor breathing mechanics \par -overweight/out of shape\par -CAD \par \par Problem 1: Allergy / Sinus\par -Continue using Zyrtec 5 mg QHS \par -Environmental measures for allergies were encouraged including mattress and pillow cover, air purifier, and environmental controls. \par \par Problem 2: JERRELL\par -s/p split night sleep study for memory concentration, nocturia, daily fatigue, metabolism.\par -Will transition to Resmed Fx wide full nasal mask (medium), with 8 cm pressure ([facility], [facility])\par \par -Sleep apnea is associated with adverse clinical consequences which an affect most organ systems. Cardiovascular disease risk includes arrhythmias, atrial fibrillation, hypertension, coronary artery disease, and stroke. Metabolic disorders include diabetes type 2, non-alcoholic fatty liver disease. Mood disorder especially depression; and cognitive decline especially in the elderly. Associations with chronic reflux/Morris’s esophagus some but not all inclusive. \par -Reasons include arousal consistent with hypopnea; respiratory events most prominent in REM sleep or supine position; therefore sleep staging and body position are important for accurate diagnosis and estimation of AHI. \par \par Problem 3: Cardiac\par -Recommended to follow up with cardiologist\par \par Problem 4: Poor Mechanics of Breathing\par - Proper breathing techniques were reviewed with an emphasis of exhalation. Patient instructed to breath in for 1 second and out for four seconds. Patient was encouraged to not talk while walking. \par \par Problem 5: health maintenance\par -s/p influenza vaccine - 2019\par -recommended strep pneumonia vaccines after age 65: Prevnar-13 vaccine, followed by Pneumo vaccine 23 one year following\par -recommended early intervention for URIs\par -recommended regular osteoporosis evaluations\par -recommended early dermatological evaluations\par -recommended after the age of 50 to the age of 70, colonoscopy every 5 years \par \par \par  Follow up in 4 months - SPI\par -he  is recommended to call with any changes, questions, or concerns.

## 2020-03-09 NOTE — REVIEW OF SYSTEMS
[Negative] : Endocrine [Nasal Congestion] : nasal congestion [SOB on Exertion] : sob on exertion [Cough] : no cough [Dyspnea] : no dyspnea [Wheezing] : no wheezing [Chest Discomfort] : no chest discomfort [GERD] : no gerd [Diarrhea] : no diarrhea [Constipation] : no constipation [Dysphagia] : no dysphagia [Dizziness] : no dizziness

## 2020-03-17 ENCOUNTER — APPOINTMENT (OUTPATIENT)
Dept: INTERNAL MEDICINE | Facility: CLINIC | Age: 55
End: 2020-03-17

## 2020-04-27 ENCOUNTER — APPOINTMENT (OUTPATIENT)
Dept: INTERNAL MEDICINE | Facility: CLINIC | Age: 55
End: 2020-04-27
Payer: COMMERCIAL

## 2020-04-27 DIAGNOSIS — Z86.39 PERSONAL HISTORY OF OTHER ENDOCRINE, NUTRITIONAL AND METABOLIC DISEASE: ICD-10-CM

## 2020-04-27 PROCEDURE — 99213 OFFICE O/P EST LOW 20 MIN: CPT | Mod: 95

## 2020-04-27 RX ORDER — BUPROPION HYDROCHLORIDE 450 MG/1
TABLET, FILM COATED, EXTENDED RELEASE ORAL
Refills: 0 | Status: ACTIVE | COMMUNITY

## 2020-04-27 NOTE — HISTORY OF PRESENT ILLNESS
[Medical Office: ([facility])___] : at the medical office located in  [Home] : at home, [unfilled] , at the time of the visit. [Self] : self [Patient] : the patient [FreeTextEntry1] : f/u diabetis--telemed [de-identified] : to switch from okrivis to tekfedera oral to avoid going to infusion center as per neuro\par glucoses at home 120's in am\par no home bps\par not leaving home due to Covid risk\par reviewed pulm notes

## 2020-04-30 ENCOUNTER — RX RENEWAL (OUTPATIENT)
Age: 55
End: 2020-04-30

## 2020-07-06 ENCOUNTER — APPOINTMENT (OUTPATIENT)
Dept: INTERNAL MEDICINE | Facility: CLINIC | Age: 55
End: 2020-07-06
Payer: COMMERCIAL

## 2020-07-06 VITALS
WEIGHT: 240 LBS | HEIGHT: 66 IN | TEMPERATURE: 98.3 F | SYSTOLIC BLOOD PRESSURE: 127 MMHG | BODY MASS INDEX: 38.57 KG/M2 | DIASTOLIC BLOOD PRESSURE: 82 MMHG | HEART RATE: 84 BPM | OXYGEN SATURATION: 98 %

## 2020-07-06 DIAGNOSIS — Z11.59 ENCOUNTER FOR SCREENING FOR OTHER VIRAL DISEASES: ICD-10-CM

## 2020-07-06 PROCEDURE — 36415 COLL VENOUS BLD VENIPUNCTURE: CPT

## 2020-07-06 PROCEDURE — 99213 OFFICE O/P EST LOW 20 MIN: CPT | Mod: 25

## 2020-07-06 NOTE — HISTORY OF PRESENT ILLNESS
[FreeTextEntry1] : f/u diabetes [de-identified] : am glucoses low 140-145\par admits to dietary noncompliance\par reviewed meds

## 2020-07-07 ENCOUNTER — TRANSCRIPTION ENCOUNTER (OUTPATIENT)
Age: 55
End: 2020-07-07

## 2020-07-07 LAB
ALBUMIN SERPL ELPH-MCNC: 5 G/DL
ALP BLD-CCNC: 65 U/L
ALT SERPL-CCNC: 35 U/L
ANION GAP SERPL CALC-SCNC: 17 MMOL/L
ANION GAP SERPL CALC-SCNC: 18 MMOL/L
AST SERPL-CCNC: 25 U/L
BASOPHILS # BLD AUTO: 0.06 K/UL
BASOPHILS NFR BLD AUTO: 1 %
BILIRUB SERPL-MCNC: 0.3 MG/DL
BUN SERPL-MCNC: 21 MG/DL
BUN SERPL-MCNC: 22 MG/DL
CALCIUM SERPL-MCNC: 10 MG/DL
CALCIUM SERPL-MCNC: 9.9 MG/DL
CHLORIDE SERPL-SCNC: 105 MMOL/L
CHLORIDE SERPL-SCNC: 106 MMOL/L
CHOLEST SERPL-MCNC: 164 MG/DL
CHOLEST/HDLC SERPL: 3.2 RATIO
CO2 SERPL-SCNC: 20 MMOL/L
CO2 SERPL-SCNC: 21 MMOL/L
CREAT SERPL-MCNC: 0.78 MG/DL
CREAT SERPL-MCNC: 0.81 MG/DL
EOSINOPHIL # BLD AUTO: 0.3 K/UL
EOSINOPHIL NFR BLD AUTO: 5 %
ESTIMATED AVERAGE GLUCOSE: 151 MG/DL
GLUCOSE SERPL-MCNC: 111 MG/DL
GLUCOSE SERPL-MCNC: 112 MG/DL
HBA1C MFR BLD HPLC: 6.9 %
HCT VFR BLD CALC: 51.7 %
HDLC SERPL-MCNC: 52 MG/DL
HGB BLD-MCNC: 15.8 G/DL
IMM GRANULOCYTES NFR BLD AUTO: 0.3 %
LDLC SERPL CALC-MCNC: 89 MG/DL
LYMPHOCYTES # BLD AUTO: 2.26 K/UL
LYMPHOCYTES NFR BLD AUTO: 37.6 %
MAN DIFF?: NORMAL
MCHC RBC-ENTMCNC: 28.1 PG
MCHC RBC-ENTMCNC: 30.6 GM/DL
MCV RBC AUTO: 92 FL
MONOCYTES # BLD AUTO: 0.56 K/UL
MONOCYTES NFR BLD AUTO: 9.3 %
NEUTROPHILS # BLD AUTO: 2.81 K/UL
NEUTROPHILS NFR BLD AUTO: 46.8 %
PLATELET # BLD AUTO: 184 K/UL
POTASSIUM SERPL-SCNC: 4.7 MMOL/L
POTASSIUM SERPL-SCNC: 4.8 MMOL/L
PROT SERPL-MCNC: 7.3 G/DL
RBC # BLD: 5.62 M/UL
RBC # FLD: 15.4 %
SARS-COV-2 IGG SERPL IA-ACNC: <0.1 INDEX
SARS-COV-2 IGG SERPL QL IA: NEGATIVE
SODIUM SERPL-SCNC: 142 MMOL/L
SODIUM SERPL-SCNC: 144 MMOL/L
TRIGL SERPL-MCNC: 119 MG/DL
WBC # FLD AUTO: 6.01 K/UL

## 2020-07-09 ENCOUNTER — APPOINTMENT (OUTPATIENT)
Dept: PULMONOLOGY | Facility: CLINIC | Age: 55
End: 2020-07-09
Payer: COMMERCIAL

## 2020-07-09 VITALS
DIASTOLIC BLOOD PRESSURE: 70 MMHG | WEIGHT: 240 LBS | HEART RATE: 74 BPM | RESPIRATION RATE: 17 BRPM | TEMPERATURE: 97.2 F | BODY MASS INDEX: 38.57 KG/M2 | HEIGHT: 66 IN | SYSTOLIC BLOOD PRESSURE: 120 MMHG | OXYGEN SATURATION: 98 %

## 2020-07-09 PROCEDURE — 99214 OFFICE O/P EST MOD 30 MIN: CPT

## 2020-07-09 RX ORDER — OSELTAMIVIR PHOSPHATE 75 MG/1
75 CAPSULE ORAL
Qty: 10 | Refills: 0 | Status: DISCONTINUED | COMMUNITY
Start: 2020-01-27

## 2020-07-09 RX ORDER — DIMETHYL FUMARATE 120-240 MG
120 & 240 KIT ORAL
Qty: 60 | Refills: 0 | Status: DISCONTINUED | COMMUNITY
Start: 2020-04-27

## 2020-07-09 RX ORDER — AMLODIPINE BESYLATE 5 MG/1
TABLET ORAL
Refills: 0 | Status: DISCONTINUED | COMMUNITY
End: 2020-07-09

## 2020-07-09 NOTE — ADDENDUM
[FreeTextEntry1] : Documented by Elana Chavira acting as a scribe for Dr. Pancho Byrne on 07/09/2020.\par \par All medical record entries made by the Scribe were at my, Dr. Pancho Byrne's, direction and personally dictated by me on 07/09/2020. I have reviewed the chart and agree that the record accurately reflects my personal performance of the history, physical exam, assessment and plan. I have also personally directed, reviewed, and agree with the discharge instructions.

## 2020-07-09 NOTE — ASSESSMENT
[FreeTextEntry1] : Mr. OLIVER is a 54 year old male with a history of HLD, RADS, HTN, DM, MS, JERRELL, and allergies who comes into the office today for a follow up pulmonary evaluation - His number one issue is still weight.\par \par The patient's shortness of breath is multifactorial due to:\par -pulmonary disease \par      -has risk factors for asthma (allergy)\par -poor breathing mechanics \par -overweight/out of shape\par -CAD \par \par Problem 1: Allergy / Sinus\par -Continue using Zyrtec 5 mg QHS \par -Environmental measures for allergies were encouraged including mattress and pillow cover, air purifier, and environmental controls. \par \par \par Problem 2: JERRELL\par -s/p split night sleep study for memory concentration, nocturia, daily fatigue, metabolism.\par -Resmed Fx wide full nasal mask (medium), with 8 cm pressure (community)\par \par -Sleep apnea is associated with adverse clinical consequences which an affect most organ systems. Cardiovascular disease risk includes arrhythmias, atrial fibrillation, hypertension, coronary artery disease, and stroke. Metabolic disorders include diabetes type 2, non-alcoholic fatty liver disease. Mood disorder especially depression; and cognitive decline especially in the elderly. Associations with chronic reflux/Morris’s esophagus some but not all inclusive. \par -Reasons include arousal consistent with hypopnea; respiratory events most prominent in REM sleep or supine position; therefore sleep staging and body position are important for accurate diagnosis and estimation of AHI. \par \par Problem 3: Cardiac\par -Recommended to follow up with cardiologist\par \par Problem 4: Poor Mechanics of Breathing\par - Proper breathing techniques were reviewed with an emphasis of exhalation. Patient instructed to breath in for 1 second and out for four seconds. Patient was encouraged to not talk while walking. \par \par Problem 5: Health Maintenance/COVID19 Precautions:\par - Clean your hands often. Wash your hands often with soap and water for at least 20 seconds, especially after blowing your nose, coughing, or sneezing, or having been in a public place.\par - If soap and water are not available, use a hand  that contains at least 60% alcohol.\par - To the extent possible, avoid touching high-touch surfaces in public places - elevator buttons, door handles, handrails, handshaking with people, etc. Use a tissue or your sleeve to cover your hand or finger if you must touch something.\par - Wash your hands after touching surfaces in public places.\par - Avoid touching your face, nose, eyes, etc.\par - Clean and disinfect your home to remove germs: practice routine cleaning of frequently touched surfaces (for example: tables, doorknobs, light switches, handles, desks, toilets, faucets, sinks & cell phones)\par - Avoid crowds, especially in poorly ventilated spaces. Your risk of exposure to respiratory viruses like COVID-19 may increase in crowded, closed-in settings with little air circulation if there are people in the crowd who are sick. All patients are recommended to practice social distancing and stay at least 6 feet away from others.\par - Avoid all non-essential travel including plane trips, and especially avoid embarking on cruise ships.\par -If COVID-19 is spreading in your community, take extra measures to put distance between yourself and other people to further reduce your risk of being exposed to this new virus.\par -Stay home as much as possible.\par - Consider ways of getting food brought to your house through family, social, or commercial networks\par -Be aware that the virus has been known to live in the air up to 3 hours post exposure, cardboard up to 24 hours post exposure, copper up to 4 hours post exposure, steel and plastic up to 2-3 days post exposure. Risk of transmission from these surfaces are affected by many variables.\par Immune Support Recommendations:\par -OTC Vitamin C 500mg BID \par -OTC Quercetin 250-500mg BID \par -OTC Zinc 75-100mg per day \par -OTC Melatonin 1 or 2 mg a night \par -OTC Vitamin D 1-4000mg per day \par -OTC Tonic Water 8oz per day\par Asthma and COVID19:\par You need to make sure your asthma is under control. This often requires the use of inhaled corticosteroids (and sometimes oral corticosteroids). Inhaled corticosteroids do not likely reduce your immune system’s ability to fight infections, but oral corticosteroids may. It is important to use the steps above to protect yourself to limit your exposure to any respiratory virus.\par \par Problem 6: health maintenance\par -s/p influenza vaccine - 2019\par -recommended strep pneumonia vaccines after age 65: Prevnar-13 vaccine, followed by Pneumo vaccine 23 one year following\par -recommended early intervention for URIs\par -recommended regular osteoporosis evaluations\par -recommended early dermatological evaluations\par -recommended after the age of 50 to the age of 70, colonoscopy every 5 years \par \par \par  Follow up in 4 months - SPI\par -he  is recommended to call with any changes, questions, or concerns.

## 2020-07-09 NOTE — HISTORY OF PRESENT ILLNESS
[FreeTextEntry1] : Mr. OLIVER is a 54 year old male with a history of obesity, post nasal drip, and SOB, presenting to the office today for a follow up pulmonary evaluation. His chief complaint is\par - everything has been well \par - he has been feeling well \par - he denies any chest pain / pressure \par - he notes a sour taste in the mouth all the time, he notes its almost bitter. He feels its due to some of his medications though he's not sure which one. \par - his weight has gotten a bit better \par - his bowels have been regular \par - his sinuses have been a bit drippy until he takes Zyrtec. He takes Zyrtec first thing in the morning. \par - he notes he hurt his toe recently \par - he is on Tecfidera 240 mg BID, he is tolerating it well \par - his sleep has been good, he is using his CPAP machine all the time, about 6-8 hours a night \par - he has been walking around the block with his dog for exercise  \par -he denies any headaches, nausea, vomiting, fever, chills, sweats, chest pain, chest pressure, diarrhea, constipation, dysphagia, dizziness, sour taste in the mouth, leg swelling, leg pain, itchy eyes, itchy ears, heartburn, reflux, myalgias or arthralgias.

## 2020-07-15 ENCOUNTER — APPOINTMENT (OUTPATIENT)
Dept: ENDOCRINOLOGY | Facility: CLINIC | Age: 55
End: 2020-07-15
Payer: COMMERCIAL

## 2020-07-15 PROCEDURE — G0108 DIAB MANAGE TRN  PER INDIV: CPT

## 2020-07-29 ENCOUNTER — APPOINTMENT (OUTPATIENT)
Dept: ENDOCRINOLOGY | Facility: CLINIC | Age: 55
End: 2020-07-29
Payer: COMMERCIAL

## 2020-07-29 PROCEDURE — G0108 DIAB MANAGE TRN  PER INDIV: CPT

## 2020-07-30 ENCOUNTER — RX RENEWAL (OUTPATIENT)
Age: 55
End: 2020-07-30

## 2020-07-31 ENCOUNTER — RX RENEWAL (OUTPATIENT)
Age: 55
End: 2020-07-31

## 2020-08-03 ENCOUNTER — RX RENEWAL (OUTPATIENT)
Age: 55
End: 2020-08-03

## 2020-08-11 ENCOUNTER — TRANSCRIPTION ENCOUNTER (OUTPATIENT)
Age: 55
End: 2020-08-11

## 2020-09-11 ENCOUNTER — TRANSCRIPTION ENCOUNTER (OUTPATIENT)
Age: 55
End: 2020-09-11

## 2020-09-11 ENCOUNTER — NON-APPOINTMENT (OUTPATIENT)
Age: 55
End: 2020-09-11

## 2020-09-15 ENCOUNTER — APPOINTMENT (OUTPATIENT)
Dept: INTERNAL MEDICINE | Facility: CLINIC | Age: 55
End: 2020-09-15
Payer: COMMERCIAL

## 2020-09-15 PROCEDURE — 90471 IMMUNIZATION ADMIN: CPT

## 2020-09-15 PROCEDURE — 90732 PPSV23 VACC 2 YRS+ SUBQ/IM: CPT

## 2020-09-15 PROCEDURE — 90472 IMMUNIZATION ADMIN EACH ADD: CPT

## 2020-09-15 PROCEDURE — 90715 TDAP VACCINE 7 YRS/> IM: CPT

## 2020-09-23 ENCOUNTER — TRANSCRIPTION ENCOUNTER (OUTPATIENT)
Age: 55
End: 2020-09-23

## 2020-09-29 ENCOUNTER — APPOINTMENT (OUTPATIENT)
Dept: INTERNAL MEDICINE | Facility: CLINIC | Age: 55
End: 2020-09-29

## 2020-09-29 ENCOUNTER — APPOINTMENT (OUTPATIENT)
Dept: INTERNAL MEDICINE | Facility: CLINIC | Age: 55
End: 2020-09-29
Payer: COMMERCIAL

## 2020-09-29 PROCEDURE — 90686 IIV4 VACC NO PRSV 0.5 ML IM: CPT

## 2020-09-29 PROCEDURE — 90750 HZV VACC RECOMBINANT IM: CPT

## 2020-09-29 PROCEDURE — G0008: CPT

## 2020-09-29 PROCEDURE — 90472 IMMUNIZATION ADMIN EACH ADD: CPT

## 2020-09-29 RX ORDER — ZOSTER VACCINE RECOMBINANT, ADJUVANTED 50 MCG/0.5
50 KIT INTRAMUSCULAR
Qty: 1 | Refills: 0 | Status: COMPLETED | COMMUNITY
Start: 2020-09-29 | End: 2020-09-29

## 2020-09-29 RX ORDER — ZOSTER VACCINE RECOMBINANT, ADJUVANTED 50 MCG/0.5
50 KIT INTRAMUSCULAR
Qty: 1 | Refills: 0 | Status: COMPLETED | COMMUNITY
Start: 2020-09-15 | End: 2020-09-29

## 2020-10-26 ENCOUNTER — TRANSCRIPTION ENCOUNTER (OUTPATIENT)
Age: 55
End: 2020-10-26

## 2020-11-19 ENCOUNTER — TRANSCRIPTION ENCOUNTER (OUTPATIENT)
Age: 55
End: 2020-11-19

## 2020-12-03 ENCOUNTER — TRANSCRIPTION ENCOUNTER (OUTPATIENT)
Age: 55
End: 2020-12-03

## 2020-12-03 RX ORDER — ZOSTER VACCINE RECOMBINANT, ADJUVANTED 50 MCG/0.5
50 KIT INTRAMUSCULAR
Qty: 1 | Refills: 0 | Status: COMPLETED | COMMUNITY
Start: 2020-12-02 | End: 2020-12-03

## 2020-12-10 ENCOUNTER — APPOINTMENT (OUTPATIENT)
Dept: INTERNAL MEDICINE | Facility: CLINIC | Age: 55
End: 2020-12-10

## 2021-01-12 ENCOUNTER — TRANSCRIPTION ENCOUNTER (OUTPATIENT)
Age: 56
End: 2021-01-12

## 2021-01-13 ENCOUNTER — TRANSCRIPTION ENCOUNTER (OUTPATIENT)
Age: 56
End: 2021-01-13

## 2021-02-09 ENCOUNTER — APPOINTMENT (OUTPATIENT)
Dept: INTERNAL MEDICINE | Facility: CLINIC | Age: 56
End: 2021-02-09
Payer: COMMERCIAL

## 2021-02-09 VITALS — DIASTOLIC BLOOD PRESSURE: 62 MMHG | SYSTOLIC BLOOD PRESSURE: 110 MMHG

## 2021-02-09 VITALS
BODY MASS INDEX: 38.57 KG/M2 | RESPIRATION RATE: 17 BRPM | TEMPERATURE: 98.5 F | OXYGEN SATURATION: 98 % | WEIGHT: 240 LBS | HEIGHT: 66 IN | HEART RATE: 71 BPM

## 2021-02-09 PROCEDURE — 99072 ADDL SUPL MATRL&STAF TM PHE: CPT

## 2021-02-09 PROCEDURE — 99214 OFFICE O/P EST MOD 30 MIN: CPT | Mod: 25

## 2021-02-09 PROCEDURE — 36415 COLL VENOUS BLD VENIPUNCTURE: CPT

## 2021-02-09 NOTE — PHYSICAL EXAM
[Normal] : soft, non-tender, non-distended, no masses palpated, no HSM and normal bowel sounds no Equal and normal pulses (carotid, femoral, dorsalis pedis)

## 2021-02-09 NOTE — HISTORY OF PRESENT ILLNESS
[FreeTextEntry1] : f/u aodm [de-identified] : seeing neuro (Tawnya)-stable mri's\par seeing pysc virtually\par seeing  (Katheryn)--neurogenic bladder better on Mybetriq\par ave morning #'s--120-130's\par no home bps

## 2021-02-10 ENCOUNTER — TRANSCRIPTION ENCOUNTER (OUTPATIENT)
Age: 56
End: 2021-02-10

## 2021-02-10 LAB
25(OH)D3 SERPL-MCNC: 35.5 NG/ML
ALBUMIN SERPL ELPH-MCNC: 4.7 G/DL
ALP BLD-CCNC: 60 U/L
ALT SERPL-CCNC: 44 U/L
ANION GAP SERPL CALC-SCNC: 15 MMOL/L
AST SERPL-CCNC: 20 U/L
BASOPHILS # BLD AUTO: 0.05 K/UL
BASOPHILS NFR BLD AUTO: 0.9 %
BILIRUB SERPL-MCNC: 0.3 MG/DL
BUN SERPL-MCNC: 20 MG/DL
CALCIUM SERPL-MCNC: 9.5 MG/DL
CHLORIDE SERPL-SCNC: 104 MMOL/L
CO2 SERPL-SCNC: 23 MMOL/L
CREAT SERPL-MCNC: 0.82 MG/DL
EOSINOPHIL # BLD AUTO: 0.16 K/UL
EOSINOPHIL NFR BLD AUTO: 3 %
ESTIMATED AVERAGE GLUCOSE: 180 MG/DL
GLUCOSE SERPL-MCNC: 152 MG/DL
HBA1C MFR BLD HPLC: 7.9 %
HCT VFR BLD CALC: 51.3 %
HGB BLD-MCNC: 15.4 G/DL
IMM GRANULOCYTES NFR BLD AUTO: 0.4 %
LYMPHOCYTES # BLD AUTO: 1.59 K/UL
LYMPHOCYTES NFR BLD AUTO: 29.4 %
MAN DIFF?: NORMAL
MCHC RBC-ENTMCNC: 27.9 PG
MCHC RBC-ENTMCNC: 30 GM/DL
MCV RBC AUTO: 92.9 FL
MONOCYTES # BLD AUTO: 0.39 K/UL
MONOCYTES NFR BLD AUTO: 7.2 %
NEUTROPHILS # BLD AUTO: 3.19 K/UL
NEUTROPHILS NFR BLD AUTO: 59.1 %
PLATELET # BLD AUTO: 176 K/UL
POTASSIUM SERPL-SCNC: 5 MMOL/L
PROT SERPL-MCNC: 6.7 G/DL
RBC # BLD: 5.52 M/UL
RBC # FLD: 15 %
SODIUM SERPL-SCNC: 141 MMOL/L
WBC # FLD AUTO: 5.4 K/UL

## 2021-02-11 ENCOUNTER — TRANSCRIPTION ENCOUNTER (OUTPATIENT)
Age: 56
End: 2021-02-11

## 2021-02-11 RX ORDER — FLASH GLUCOSE SENSOR
KIT MISCELLANEOUS
Qty: 1 | Refills: 0 | Status: COMPLETED | OUTPATIENT
Start: 2020-09-23 | End: 2021-02-11

## 2021-02-11 RX ORDER — FLASH GLUCOSE SENSOR
KIT MISCELLANEOUS
Qty: 1 | Refills: 5 | Status: COMPLETED | OUTPATIENT
Start: 2020-09-23 | End: 2021-02-11

## 2021-03-02 ENCOUNTER — APPOINTMENT (OUTPATIENT)
Dept: ENDOCRINOLOGY | Facility: CLINIC | Age: 56
End: 2021-03-02
Payer: COMMERCIAL

## 2021-03-02 VITALS
HEIGHT: 66 IN | TEMPERATURE: 98.2 F | DIASTOLIC BLOOD PRESSURE: 90 MMHG | SYSTOLIC BLOOD PRESSURE: 144 MMHG | WEIGHT: 240 LBS | HEART RATE: 69 BPM | BODY MASS INDEX: 38.57 KG/M2 | OXYGEN SATURATION: 95 %

## 2021-03-02 PROCEDURE — 99204 OFFICE O/P NEW MOD 45 MIN: CPT

## 2021-03-02 PROCEDURE — 99072 ADDL SUPL MATRL&STAF TM PHE: CPT

## 2021-03-02 NOTE — CONSULT LETTER
[Dear  ___] : Dear ~SITA, [Consult Letter:] : I had the pleasure of evaluating your patient, [unfilled]. [Please see my note below.] : Please see my note below. [Consult Closing:] : Thank you very much for allowing me to participate in the care of this patient.  If you have any questions, please do not hesitate to contact me. [Sincerely,] : Sincerely, [FreeTextEntry2] : Dr. Lele Burks\[facility] [address]\[place] [id number] [FreeTextEntry3] : Mani Pollock DO\par Division of Endocrinology\par [facility]\par [facility]\par  of Medicine\par [employer]\par Director of [facility]

## 2021-03-02 NOTE — HISTORY OF PRESENT ILLNESS
[FreeTextEntry1] : 54 y/o M w/ hx of Type 2 DM diagnosed in 2000 around the time of getting steroids for MS. Prior to 2000 was diagnosed with prediabetes. Started on insulin with steroids. Had been getting infusions every 6 months now on hold for COVID. Infusions are premedicated with steroid so takes sliding scale of Humalog with meals and bedtime which he takes until his blood glucose comes down to normal. Otherwise on metformin 1000mg BID, Jardiance 25mg daily, and Victoza 1.8mg daily. Has been on this regimen since 2019. Has been eating more carbs recently due to quarantine. Checks glucose 4x/day but having difficulty due to dry skin and irritation from alcohol pads. No reported hypoglycemia or symptoms  of hypoglycemia. +polyuria with neurogenic bladder on Mybetriq, no polydipsia unless eats too much glucose. No nausea or vomiting. No change in weight. \par Follows with podiatry  Dr. Hearn for routine diabetes foot care. No hx of neuropathy in feet. \par No hx of nephropathy\par No hx of retinopathy. Last optho 2/2021.\par \par HTN on valsartan and amlodipine\par \par HLD on statin\par

## 2021-03-02 NOTE — REVIEW OF SYSTEMS
[Recent Weight Gain (___ Lbs)] : recent weight gain: [unfilled] lbs [Polyuria] : polyuria [Joint Pain] : joint pain [Pain/Numbness of Digits] : pain/numbness of digits [Polydipsia] : polydipsia [All other systems negative] : All other systems negative [Fatigue] : no fatigue [Visual Field Defect] : no visual field defect [Dysphagia] : no dysphagia [Dysphonia] : no dysphonia [Chest Pain] : no chest pain [Palpitations] : no palpitations [Shortness Of Breath] : no shortness of breath [Nausea] : no nausea [Vomiting] : no vomiting

## 2021-03-02 NOTE — ASSESSMENT
[FreeTextEntry1] : 56 y/o M w/\par \par 1. Uncontrolled Type 2 DM w/ hyperglycemia with recent nonadherence to diet- Discussed the importance of improved glycemic control to prevent long term complications from diabetes. Goal A1c <7% Had been at goal on current regimen when better adherent to diet. His regimen is optimal in terms of medications with low risk of hypoglycemia and additional weight loss, CV, and renal benefits. Would recommend continue current regimen. Bryan placed today and ordered for home use to gain a better sense of overall glycemic control once more adherent to diet. If A1c or glucose remains above goal can consider switching to weekly GLP-1 at higher dose or Saxenda at 3mg dose. Optho follow-up up to date. 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 Check micro/cr.\par \par 2. HTN- monitor BP on amlodipine and valsartan. BP above goal today, if persistent can increase amlodipine to 10mg daily.\par \par 3. HLD- c/w statin

## 2021-03-02 NOTE — REASON FOR VISIT
[Initial Evaluation] : an initial evaluation [DM Type 2] : DM Type 2 [FreeTextEntry2] : Dr. Lele Burks

## 2021-03-02 NOTE — PHYSICAL EXAM
[Alert] : alert [Healthy Appearance] : healthy appearance [No Acute Distress] : no acute distress [EOMI] : extra ocular movement intact [Normal Hearing] : hearing was normal [Supple] : the neck was supple [Thyroid Not Enlarged] : the thyroid was not enlarged [No Respiratory Distress] : no respiratory distress [No Accessory Muscle Use] : no accessory muscle use [Normal Rate and Effort] : normal respiratory rate and effort [Clear to Auscultation] : lungs were clear to auscultation bilaterally [Normal S1, S2] : normal S1 and S2 [Normal Rate] : heart rate was normal [Regular Rhythm] : with a regular rhythm [Normal Bowel Sounds] : normal bowel sounds [Not Tender] : non-tender [Not Distended] : not distended [Soft] : abdomen soft [No Clubbing, Cyanosis] : no clubbing  or cyanosis of the fingernails [No Involuntary Movements] : no involuntary movements were seen [Right Foot Was Examined] : right foot ~C was examined [Left Foot Was Examined] : left foot ~C was examined [Normal] : normal [2+] : 2+ in the dorsalis pedis [Oriented x3] : oriented to person, place, and time [Normal Affect] : the affect was normal [Normal Mood] : the mood was normal [Kyphosis] : no kyphosis present [Diminished Throughout Both Feet] : normal tactile sensation with monofilament testing throughout both feet [de-identified] : +trace LE edema b/l

## 2021-03-16 ENCOUNTER — TRANSCRIPTION ENCOUNTER (OUTPATIENT)
Age: 56
End: 2021-03-16

## 2021-03-29 ENCOUNTER — TRANSCRIPTION ENCOUNTER (OUTPATIENT)
Age: 56
End: 2021-03-29

## 2021-03-30 ENCOUNTER — TRANSCRIPTION ENCOUNTER (OUTPATIENT)
Age: 56
End: 2021-03-30

## 2021-04-06 ENCOUNTER — TRANSCRIPTION ENCOUNTER (OUTPATIENT)
Age: 56
End: 2021-04-06

## 2021-04-07 ENCOUNTER — TRANSCRIPTION ENCOUNTER (OUTPATIENT)
Age: 56
End: 2021-04-07

## 2021-04-12 DIAGNOSIS — Z01.812 ENCOUNTER FOR PREPROCEDURAL LABORATORY EXAMINATION: ICD-10-CM

## 2021-04-13 ENCOUNTER — TRANSCRIPTION ENCOUNTER (OUTPATIENT)
Age: 56
End: 2021-04-13

## 2021-05-03 ENCOUNTER — APPOINTMENT (OUTPATIENT)
Dept: ENDOCRINOLOGY | Facility: CLINIC | Age: 56
End: 2021-05-03
Payer: COMMERCIAL

## 2021-05-03 VITALS
DIASTOLIC BLOOD PRESSURE: 85 MMHG | TEMPERATURE: 98 F | HEART RATE: 80 BPM | OXYGEN SATURATION: 96 % | WEIGHT: 235 LBS | BODY MASS INDEX: 37.77 KG/M2 | HEIGHT: 66 IN | SYSTOLIC BLOOD PRESSURE: 136 MMHG

## 2021-05-03 PROCEDURE — 99072 ADDL SUPL MATRL&STAF TM PHE: CPT

## 2021-05-03 PROCEDURE — 95251 CONT GLUC MNTR ANALYSIS I&R: CPT

## 2021-05-03 PROCEDURE — 99214 OFFICE O/P EST MOD 30 MIN: CPT | Mod: 25

## 2021-05-03 RX ORDER — PREDNISOLONE ACETATE 10 MG/ML
1 SUSPENSION/ DROPS OPHTHALMIC
Qty: 5 | Refills: 0 | Status: ACTIVE | COMMUNITY
Start: 2021-03-12

## 2021-05-03 RX ORDER — MIRABEGRON 50 MG/1
50 TABLET, FILM COATED, EXTENDED RELEASE ORAL
Qty: 90 | Refills: 0 | Status: ACTIVE | COMMUNITY
Start: 2021-04-05

## 2021-05-03 NOTE — ASSESSMENT
[FreeTextEntry1] : 54 y/o M w/\par \par 1. Uncontrolled Type 2 DM w/ hyperglycemia with recent nonadherence to diet- Discussed the importance of improved glycemic control to prevent long term complications from diabetes. Goal A1c <7% Had been at goal on current regimen when better adherent to diet. His regimen is optimal in terms of medications with low risk of hypoglycemia and additional weight loss, CV, and renal benefits. Would recommend continue current regimen. Bryan placed at our initial visit and ordered for home use to gain a better sense of overall glycemic control once more adherent to diet. If A1c or glucose remains above goal can consider switching to weekly GLP-1 at higher dose or Saxenda at 3mg dose. Optho follow-up up to date. 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 Check micro/cr.\par \par 2. HTN- monitor BP on amlodipine and valsartan. BP better today, if persistently elevated can increase amlodipine to 10mg daily.\par \par 3. HLD- c/w statin

## 2021-05-03 NOTE — PHYSICAL EXAM
[Alert] : alert [Healthy Appearance] : healthy appearance [No Acute Distress] : no acute distress [EOMI] : extra ocular movement intact [Normal Hearing] : hearing was normal [Supple] : the neck was supple [Thyroid Not Enlarged] : the thyroid was not enlarged [No Respiratory Distress] : no respiratory distress [No Accessory Muscle Use] : no accessory muscle use [Normal Rate and Effort] : normal respiratory rate and effort [Clear to Auscultation] : lungs were clear to auscultation bilaterally [Normal S1, S2] : normal S1 and S2 [Normal Rate] : heart rate was normal [Regular Rhythm] : with a regular rhythm [Normal Bowel Sounds] : normal bowel sounds [Not Tender] : non-tender [Not Distended] : not distended [Soft] : abdomen soft [No Clubbing, Cyanosis] : no clubbing  or cyanosis of the fingernails [No Involuntary Movements] : no involuntary movements were seen [Right Foot Was Examined] : right foot ~C was examined [Left Foot Was Examined] : left foot ~C was examined [Normal] : normal [2+] : 2+ in the dorsalis pedis [Oriented x3] : oriented to person, place, and time [Normal Affect] : the affect was normal [Normal Mood] : the mood was normal [Kyphosis] : no kyphosis present [Diminished Throughout Both Feet] : normal tactile sensation with monofilament testing throughout both feet [de-identified] : +trace LE edema b/l

## 2021-05-03 NOTE — DATA REVIEWED
[FreeTextEntry1] : Bryan 5/3/2021: Reviewed and interpreted with values mostly at goal with occasional hyperglycemia usually early morning or after a meal.\par \par Labs 2/9/2021:\par A1c 7.9%\par CMP normal except glucose of 152\par Vit D 35.5

## 2021-05-03 NOTE — HISTORY OF PRESENT ILLNESS
[FreeTextEntry1] : 56 y/o M w/ hx of Type 2 DM diagnosed in 2000 around the time of getting steroids for MS. Prior to 2000 was diagnosed with prediabetes. Started on insulin with steroids. Had been getting infusions every 6 months now on hold for COVID. Infusions are premedicated with steroid so takes sliding scale of Humalog with meals and bedtime which he takes until his blood glucose comes down to normal. Otherwise on metformin 1000mg BID, Jardiance 25mg daily, and Victoza 1.8mg daily. Has been on this regimen since 2019. Had been eating more carbs due to quarantine. Checks glucose 4x/day but having difficulty due to dry skin and irritation from alcohol pads. Average glucose 130-135. No reported hypoglycemia or symptoms  of hypoglycemia. +polyuria with neurogenic bladder on Mybetriq, no polydipsia unless eats too much glucose. No nausea or vomiting. No change in weight. \par Follows with podiatry  Dr. Hearn for routine diabetes foot care. No hx of neuropathy in feet. \par No hx of nephropathy\par No hx of retinopathy. Last optho 5/2021.\par \par HTN on valsartan and amlodipine\par \par HLD on statin\par

## 2021-05-03 NOTE — REVIEW OF SYSTEMS
[Polyuria] : polyuria [Joint Pain] : joint pain [Pain/Numbness of Digits] : pain/numbness of digits [Polydipsia] : polydipsia [All other systems negative] : All other systems negative [Recent Weight Loss (___ Lbs)] : recent weight loss: [unfilled] lbs [Fatigue] : no fatigue [Recent Weight Gain (___ Lbs)] : no recent weight gain [Visual Field Defect] : no visual field defect [Dysphagia] : no dysphagia [Dysphonia] : no dysphonia [Chest Pain] : no chest pain [Palpitations] : no palpitations [Shortness Of Breath] : no shortness of breath [Nausea] : no nausea [Vomiting] : no vomiting

## 2021-05-08 LAB — SARS-COV-2 N GENE NPH QL NAA+PROBE: NOT DETECTED

## 2021-05-10 ENCOUNTER — APPOINTMENT (OUTPATIENT)
Dept: PULMONOLOGY | Facility: CLINIC | Age: 56
End: 2021-05-10
Payer: COMMERCIAL

## 2021-05-10 VITALS
WEIGHT: 234 LBS | BODY MASS INDEX: 37.61 KG/M2 | HEIGHT: 66 IN | TEMPERATURE: 97.5 F | SYSTOLIC BLOOD PRESSURE: 120 MMHG | RESPIRATION RATE: 16 BRPM | OXYGEN SATURATION: 98 % | HEART RATE: 84 BPM | DIASTOLIC BLOOD PRESSURE: 70 MMHG

## 2021-05-10 PROCEDURE — 99072 ADDL SUPL MATRL&STAF TM PHE: CPT

## 2021-05-10 PROCEDURE — 99214 OFFICE O/P EST MOD 30 MIN: CPT

## 2021-05-10 RX ORDER — MIRABEGRON 25 MG/1
25 TABLET, FILM COATED, EXTENDED RELEASE ORAL
Qty: 90 | Refills: 0 | Status: ACTIVE | COMMUNITY
Start: 2021-02-13

## 2021-05-10 RX ORDER — ERYTHROMYCIN 5 MG/G
5 OINTMENT OPHTHALMIC
Qty: 4 | Refills: 0 | Status: ACTIVE | COMMUNITY
Start: 2021-02-24

## 2021-05-10 NOTE — PHYSICAL EXAM
[General Appearance - Well Developed] : well developed [Normal Appearance] : normal appearance [Well Groomed] : well groomed [General Appearance - Well Nourished] : well nourished [No Deformities] : no deformities [General Appearance - In No Acute Distress] : no acute distress [Normal Conjunctiva] : the conjunctiva exhibited no abnormalities [Eyelids - No Xanthelasma] : the eyelids demonstrated no xanthelasmas [Normal Oropharynx] : normal oropharynx [III] : III [Neck Appearance] : the appearance of the neck was normal [Neck Cervical Mass (___cm)] : no neck mass was observed [Jugular Venous Distention Increased] : there was no jugular-venous distention [Thyroid Diffuse Enlargement] : the thyroid was not enlarged [Thyroid Nodule] : there were no palpable thyroid nodules [Heart Rate And Rhythm] : heart rate and rhythm were normal [Heart Sounds] : normal S1 and S2 [Murmurs] : no murmurs present [Respiration, Rhythm And Depth] : normal respiratory rhythm and effort [Exaggerated Use Of Accessory Muscles For Inspiration] : no accessory muscle use [Auscultation Breath Sounds / Voice Sounds] : lungs were clear to auscultation bilaterally [Abdomen Soft] : soft [Abdomen Tenderness] : non-tender [Abdomen Mass (___ Cm)] : no abdominal mass palpated [Abnormal Walk] : normal gait [Gait - Sufficient For Exercise Testing] : the gait was sufficient for exercise testing [Nail Clubbing] : no clubbing of the fingernails [Cyanosis, Localized] : no localized cyanosis [Petechial Hemorrhages (___cm)] : no petechial hemorrhages [No Venous Stasis] : no venous stasis [Skin Lesions] : no skin lesions [No Skin Ulcers] : no skin ulcer [No Xanthoma] : no  xanthoma was observed [Deep Tendon Reflexes (DTR)] : deep tendon reflexes were 2+ and symmetric [Sensation] : the sensory exam was normal to light touch and pinprick [No Focal Deficits] : no focal deficits [Oriented To Time, Place, And Person] : oriented to person, place, and time [Impaired Insight] : insight and judgment were intact [Affect] : the affect was normal [Skin Color & Pigmentation] : normal skin color and pigmentation [Skin Turgor] : normal skin turgor [] : no rash [FreeTextEntry1] : I:E ratio 1:3; clear

## 2021-05-10 NOTE — ADDENDUM
[FreeTextEntry1] : Documented by Ian Santiago acting as a scribe for Dr. Pancho Byrne on 05/10/2021.\par \par All medical record entries made by the Scribe were at my, Dr. Pancho Byrne's, direction and personally dictated by me on 05/10/2021 . I have reviewed the chart and agree that the record accurately reflects my personal performance of the history, physical exam, assessment and plan. I have also personally directed, reviewed, and agree with the discharge instructions. \par

## 2021-05-10 NOTE — ASSESSMENT
[FreeTextEntry1] : Mr. OLIVER is a 55 year old male with a history of HLD, RADS, HTN, DM, MS, JERRELL, and allergies who comes into the office today for a follow up pulmonary evaluation - His number one issue is still weight. (improving). \par \par The patient's shortness of breath is multifactorial due to:\par -pulmonary disease \par      -has risk factors for asthma (allergy)\par -poor breathing mechanics \par -overweight/out of shape\par -CAD \par \par Problem 1: Allergy / Sinus\par -Continue using Zyrtec 5 mg QHS \par -Environmental measures for allergies were encouraged including mattress and pillow cover, air purifier, and environmental controls. \par \par Problem 2: JERRELL (compliant) \par -s/p split night sleep study for memory concentration, nocturia, daily fatigue, metabolism.\par -Resmed Fx wide full nasal mask (medium), with 8 cm pressure ([facility])\par \par -Sleep apnea is associated with adverse clinical consequences which an affect most organ systems. Cardiovascular disease risk includes arrhythmias, atrial fibrillation, hypertension, coronary artery disease, and stroke. Metabolic disorders include diabetes type 2, non-alcoholic fatty liver disease. Mood disorder especially depression; and cognitive decline especially in the elderly. Associations with chronic reflux/Morris’s esophagus some but not all inclusive. \par -Reasons include arousal consistent with hypopnea; respiratory events most prominent in REM sleep or supine position; therefore sleep staging and body position are important for accurate diagnosis and estimation of AHI. \par \par Problem 3: Cardiac\par -Recommended to follow up with cardiologist if needed\par \par Problem 4: Poor Mechanics of Breathing\par - Proper breathing techniques were reviewed with an emphasis of exhalation. Patient instructed to breath in for 1 second and out for four seconds. Patient was encouraged to not talk while walking. \par \par Problem 5: Health Maintenance/COVID19 Precautions:\par -s/p Pfizer COVID 19 vaccine x 2 \par - Clean your hands often. Wash your hands often with soap and water for at least 20 seconds, especially after blowing your nose, coughing, or sneezing, or having been in a public place.\par - If soap and water are not available, use a hand  that contains at least 60% alcohol.\par - To the extent possible, avoid touching high-touch surfaces in public places - elevator buttons, door handles, handrails, handshaking with people, etc. Use a tissue or your sleeve to cover your hand or finger if you must touch something.\par - Wash your hands after touching surfaces in public places.\par - Avoid touching your face, nose, eyes, etc.\par - Clean and disinfect your home to remove germs: practice routine cleaning of frequently touched surfaces (for example: tables, doorknobs, light switches, handles, desks, toilets, faucets, sinks & cell phones)\par - Avoid crowds, especially in poorly ventilated spaces. Your risk of exposure to respiratory viruses like COVID-19 may increase in crowded, closed-in settings with little air circulation if there are people in the crowd who are sick. All patients are recommended to practice social distancing and stay at least 6 feet away from others.\par - Avoid all non-essential travel including plane trips, and especially avoid embarking on cruise ships.\par -If COVID-19 is spreading in your community, take extra measures to put distance between yourself and other people to further reduce your risk of being exposed to this new virus.\par -Stay home as much as possible.\par - Consider ways of getting food brought to your house through family, social, or commercial networks\par -Be aware that the virus has been known to live in the air up to 3 hours post exposure, cardboard up to 24 hours post exposure, copper up to 4 hours post exposure, steel and plastic up to 2-3 days post exposure. Risk of transmission from these surfaces are affected by many variables.\par Immune Support Recommendations:\par -OTC Vitamin C 500mg BID \par -OTC Quercetin 250-500mg BID \par -OTC Zinc 75-100mg per day \par -OTC Melatonin 1 or 2 mg a night \par -OTC Vitamin D 1-4000mg per day \par -OTC Tonic Water 8oz per day\par Asthma and COVID19:\par You need to make sure your asthma is under control. This often requires the use of inhaled corticosteroids (and sometimes oral corticosteroids). Inhaled corticosteroids do not likely reduce your immune system’s ability to fight infections, but oral corticosteroids may. It is important to use the steps above to protect yourself to limit your exposure to any respiratory virus.\par \par Problem 6: health maintenance\par -s/p influenza vaccine - 2019\par -recommended strep pneumonia vaccines after age 65: Prevnar-13 vaccine, followed by Pneumo vaccine 23 one year following\par -recommended early intervention for URIs\par -recommended regular osteoporosis evaluations\par -recommended early dermatological evaluations\par -recommended after the age of 50 to the age of 70, colonoscopy every 5 years \par \par \par  Follow up in 4 months - [facility]\par -he  is recommended to call with any changes, questions, or concerns.

## 2021-05-10 NOTE — HISTORY OF PRESENT ILLNESS
[FreeTextEntry1] : Mr. OLIVER is a 55 year old male with a history of obesity, post nasal drip, and SOB, presenting to the office today for a follow up pulmonary evaluation. His chief complaint is\par \par -he notes generally feeling well\par -he notes regular exercise waking dog\par -he notes ankle arthralgias intermittent and tolerated\par -he notes energy levels fluctuate\par -he notes sleep quality fluctuates\par -he notes sleeping 6-7 hours a night on average\par -he notes constipation controlled with MiraLAX supplement\par -he notes weight decreased intentionally, improved with continuous glucose monitor\par -he notes on Myrbetriq Rx for day symptoms of bladder spasm\par -he notes 1-2 episodes of nocturia a night\par -he denies taking any new vitamins, or supplements\par \par -denies any chest pain, chest pressure, diarrhea, dysphagia, sour taste in the mouth, dizziness, leg swelling, leg pain, myalgias, itchy eyes, itchy ears, heartburn, or reflux.

## 2021-05-13 ENCOUNTER — TRANSCRIPTION ENCOUNTER (OUTPATIENT)
Age: 56
End: 2021-05-13

## 2021-05-20 LAB
CREAT SPEC-SCNC: 62 MG/DL
MICROALBUMIN 24H UR DL<=1MG/L-MCNC: 1.8 MG/DL
MICROALBUMIN/CREAT 24H UR-RTO: 28 MG/G

## 2021-05-27 ENCOUNTER — TRANSCRIPTION ENCOUNTER (OUTPATIENT)
Age: 56
End: 2021-05-27

## 2021-05-27 RX ORDER — FLASH GLUCOSE SCANNING READER
EACH MISCELLANEOUS
Qty: 1 | Refills: 0 | Status: ACTIVE | COMMUNITY
Start: 2021-02-11 | End: 1900-01-01

## 2021-06-15 ENCOUNTER — APPOINTMENT (OUTPATIENT)
Dept: ENDOCRINOLOGY | Facility: CLINIC | Age: 56
End: 2021-06-15
Payer: COMMERCIAL

## 2021-06-15 VITALS
WEIGHT: 236.38 LBS | SYSTOLIC BLOOD PRESSURE: 130 MMHG | OXYGEN SATURATION: 97 % | HEART RATE: 86 BPM | DIASTOLIC BLOOD PRESSURE: 85 MMHG | HEIGHT: 66 IN | BODY MASS INDEX: 37.99 KG/M2 | TEMPERATURE: 98.7 F

## 2021-06-15 LAB — HBA1C MFR BLD HPLC: 6.7

## 2021-06-15 PROCEDURE — 83036 HEMOGLOBIN GLYCOSYLATED A1C: CPT | Mod: QW

## 2021-06-15 PROCEDURE — 99072 ADDL SUPL MATRL&STAF TM PHE: CPT

## 2021-06-15 PROCEDURE — 99214 OFFICE O/P EST MOD 30 MIN: CPT | Mod: 25

## 2021-06-15 PROCEDURE — 95251 CONT GLUC MNTR ANALYSIS I&R: CPT

## 2021-06-15 NOTE — REVIEW OF SYSTEMS
[Polyuria] : polyuria [Joint Pain] : joint pain [Pain/Numbness of Digits] : pain/numbness of digits [Polydipsia] : polydipsia [All other systems negative] : All other systems negative [Fatigue] : no fatigue [Recent Weight Gain (___ Lbs)] : no recent weight gain [Recent Weight Loss (___ Lbs)] : no recent weight loss [Visual Field Defect] : no visual field defect [Dysphagia] : no dysphagia [Dysphonia] : no dysphonia [Chest Pain] : no chest pain [Palpitations] : no palpitations [Shortness Of Breath] : no shortness of breath [Nausea] : no nausea [Vomiting] : no vomiting

## 2021-06-15 NOTE — PHYSICAL EXAM
[Alert] : alert [Healthy Appearance] : healthy appearance [No Acute Distress] : no acute distress [EOMI] : extra ocular movement intact [Normal Hearing] : hearing was normal [Supple] : the neck was supple [Thyroid Not Enlarged] : the thyroid was not enlarged [No Respiratory Distress] : no respiratory distress [No Accessory Muscle Use] : no accessory muscle use [Normal Rate and Effort] : normal respiratory rate and effort [Clear to Auscultation] : lungs were clear to auscultation bilaterally [Normal S1, S2] : normal S1 and S2 [Normal Rate] : heart rate was normal [Regular Rhythm] : with a regular rhythm [Normal Bowel Sounds] : normal bowel sounds [Not Tender] : non-tender [Not Distended] : not distended [Soft] : abdomen soft [No Clubbing, Cyanosis] : no clubbing  or cyanosis of the fingernails [No Involuntary Movements] : no involuntary movements were seen [Right Foot Was Examined] : right foot ~C was examined [Left Foot Was Examined] : left foot ~C was examined [Normal] : normal [2+] : 2+ in the dorsalis pedis [Oriented x3] : oriented to person, place, and time [Normal Affect] : the affect was normal [Normal Mood] : the mood was normal [Kyphosis] : no kyphosis present [Acanthosis Nigricans] : no acanthosis nigricans [Acne] : no acne [Diminished Throughout Both Feet] : normal tactile sensation with monofilament testing throughout both feet [de-identified] : +trace LE edema b/l [FreeTextEntry6] : healed ulcer on right top 1st toe

## 2021-06-15 NOTE — DATA REVIEWED
[FreeTextEntry1] : Bryan 6/15/2021:  Reviewed and interpreted with values mostly at goal with occasional hyperglycemia occasionally after a meal. No hypoglycemia. At goal 93% of the time. Hyperglycemic 7%\par \par Labs 6/15/2021:\par A1c 6.7%\par \par Bryan 5/3/2021: Reviewed and interpreted with values mostly at goal with occasional hyperglycemia usually early morning or after a meal.\par \par Labs 2/9/2021:\par A1c 7.9%\par CMP normal except glucose of 152\par Vit D 35.5

## 2021-06-15 NOTE — HISTORY OF PRESENT ILLNESS
[FreeTextEntry1] : 54 y/o M w/ hx of Type 2 DM diagnosed in 2000 around the time of getting steroids for MS. Prior to 2000 was diagnosed with prediabetes. Started on insulin with steroids. Had been getting infusions every 6 months now on hold for COVID. Infusions are premedicated with steroid so takes sliding scale of Humalog with meals and bedtime which he takes until his blood glucose comes down to normal. Otherwise on metformin 1000mg BID, Jardiance 25mg daily, and Victoza 1.8mg daily. Has been on this regimen since 2019. Had been eating more carbs due to quarantine. Checks glucose 4x/day but having difficulty due to dry skin and irritation from alcohol pads. Average glucose 130-135. No reported hypoglycemia or symptoms  of hypoglycemia. +polyuria with neurogenic bladder on Mybetriq, no polydipsia unless eats too much glucose. No nausea or vomiting. No change in weight. \par Follows with podiatry  Dr. Hearn for routine diabetes foot care. No hx of neuropathy in feet. \par No hx of nephropathy\par No hx of retinopathy. Last optho 5/2021.\par \par HTN on valsartan and amlodipine\par \par HLD on statin\par

## 2021-06-15 NOTE — ASSESSMENT
[FreeTextEntry1] : 54 y/o M w/\par \par 1. Uncontrolled Type 2 DM w/ hyperglycemia with recent nonadherence to diet- Discussed the importance of improved glycemic control to prevent long term complications from diabetes. Goal A1c <7% Had been at goal on current regimen when better adherent to diet. His regimen is optimal in terms of medications with low risk of hypoglycemia and additional weight loss, CV, and renal benefits. Would recommend continue current regimen. Bryan placed at our initial visit and ordered for home use to gain a better sense of overall glycemic control once more adherent to diet. Optho follow-up up to date. 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 Check micro/cr 5/2022.\par \par 2. HTN- monitor BP on amlodipine and valsartan. BP better today, if persistently elevated can increase amlodipine to 10mg daily.\par \par 3. HLD- c/w statin\par \par Prep time with review of labs and interval progress notes and consultations 5 min\par Discussion with patient regarding diabetes regimen with management plan, risks of hyper and hypoglycemia, treatment options and goals of care answering all patients questions and addressing all concerns 15 min\par Discussion of Bryan results in person at the time of the visit with the patient 5 min\par Post-visit completion charting and review 5 min\par Total Time 30 min\par \par Specifically causes, evaluation, treatment options, risks, complications, and benefits of available therapies were discussed. Questions were answered.\par \par The submitted E/M billing level for this visit reflects the total time spent on the day of the visit including face-to-face time spent with the patient, non-face-to-face review of medical records and relevant information, documentation, and asynchronous communication with the patient after a visit via phone, email, or patient’s EHR portal after the visit. \par The medical records reviewed are either scanned into the chart or reviewed with the patient using a patient’s electronic medical records portal for patients with records not available to [facility] via electronic transmission platforms from other institutions and labs. \par Time spend counseling and performing coordination of care was also included in determining the appropriate EM billing level.\par \par I have reviewed and verified information regarding the chief complaint and history recorded by the ancillary staff and/or the patient. I have independently reviewed and interpreted tests performed by other physicians and facilities as necessary. \par \par I have discussed with the patient differential diagnosis, reason for auxiliary tests if ordered, risks, benefits, alternatives, and complications of each form of therapy were discussed.\par

## 2021-07-27 ENCOUNTER — TRANSCRIPTION ENCOUNTER (OUTPATIENT)
Age: 56
End: 2021-07-27

## 2021-08-03 ENCOUNTER — TRANSCRIPTION ENCOUNTER (OUTPATIENT)
Age: 56
End: 2021-08-03

## 2021-08-24 ENCOUNTER — NON-APPOINTMENT (OUTPATIENT)
Age: 56
End: 2021-08-24

## 2021-08-27 ENCOUNTER — TRANSCRIPTION ENCOUNTER (OUTPATIENT)
Age: 56
End: 2021-08-27

## 2021-08-30 ENCOUNTER — APPOINTMENT (OUTPATIENT)
Dept: ORTHOPEDIC SURGERY | Facility: CLINIC | Age: 56
End: 2021-08-30
Payer: COMMERCIAL

## 2021-08-30 VITALS — SYSTOLIC BLOOD PRESSURE: 131 MMHG | DIASTOLIC BLOOD PRESSURE: 73 MMHG | HEART RATE: 61 BPM

## 2021-08-30 DIAGNOSIS — Z80.41 FAMILY HISTORY OF MALIGNANT NEOPLASM OF OVARY: ICD-10-CM

## 2021-08-30 DIAGNOSIS — Z82.49 FAMILY HISTORY OF ISCHEMIC HEART DISEASE AND OTHER DISEASES OF THE CIRCULATORY SYSTEM: ICD-10-CM

## 2021-08-30 DIAGNOSIS — Z80.8 FAMILY HISTORY OF MALIGNANT NEOPLASM OF OTHER ORGANS OR SYSTEMS: ICD-10-CM

## 2021-08-30 DIAGNOSIS — Z83.3 FAMILY HISTORY OF DIABETES MELLITUS: ICD-10-CM

## 2021-08-30 DIAGNOSIS — Z86.69 PERSONAL HISTORY OF OTHER DISEASES OF THE NERVOUS SYSTEM AND SENSE ORGANS: ICD-10-CM

## 2021-08-30 DIAGNOSIS — Z86.59 PERSONAL HISTORY OF OTHER MENTAL AND BEHAVIORAL DISORDERS: ICD-10-CM

## 2021-08-30 DIAGNOSIS — Z85.828 PERSONAL HISTORY OF OTHER MALIGNANT NEOPLASM OF SKIN: ICD-10-CM

## 2021-08-30 DIAGNOSIS — Z87.19 PERSONAL HISTORY OF OTHER DISEASES OF THE DIGESTIVE SYSTEM: ICD-10-CM

## 2021-08-30 DIAGNOSIS — Z87.442 PERSONAL HISTORY OF URINARY CALCULI: ICD-10-CM

## 2021-08-30 PROCEDURE — 73030 X-RAY EXAM OF SHOULDER: CPT | Mod: LT

## 2021-08-30 PROCEDURE — 99213 OFFICE O/P EST LOW 20 MIN: CPT

## 2021-08-30 RX ORDER — BUPROPION HYDROCHLORIDE 100 MG/1
TABLET, FILM COATED ORAL
Refills: 0 | Status: ACTIVE | COMMUNITY

## 2021-08-30 NOTE — PHYSICAL EXAM
[de-identified] : The patient appears well nourished  and in no apparent distress.  The patient is alert and oriented to person, place, and time.   Affect and mood appear normal.    The head is normocephalic and atraumatic.  The eyes reveal normal sclera and extra ocular muscles are intact.   The neck appears normal with no jugular venous distention or masses noted.   Skin shows normal turgor with no evidence of eczema or psoriasis.  No respiratory distress noted.  The patient ambulates with a normal gait.\par \par The left shoulder has catching with range of motion.  Significant reduction in motion with forward flexion to 120, external rotation 20, abduction 80, internal rotation to the lumbosacral junction.  Significant weakness of the rotator cuff is noted.  Tenderness about the glenohumeral joint is noted.  There is no soft tissue swelling.  There is no eccyhmosis.  There is no erythema or warmth.   There is a negative impingement sign.  There is a negative Hawkings sign.  .  There is no instability.  No lymphadenopathy or edema is noted.  Pulses and capillary refill are normal.  Sensation is normal.     [de-identified] : AP, outlet,  and glenoid and axillary views of the left shoulder were obtained.  There is severe narrowing of the glenohumeral joint with significant glenoid dysplasia and a significant posterior wear of the glenoid.  There is no fracture, dislocation, or subluxation.  \par \par The patient had a prior MRI in 2012.  He brings in the report on today's visit.  Shows evidence of osteoarthritis of the glenohumeral joint with anterior posterior labral tears.  There is also extensive partial-thickness tear of the supraspinatus.

## 2021-08-30 NOTE — HISTORY OF PRESENT ILLNESS
[de-identified] : This patient presents today for evaluation regarding left shoulder pain.  Said left shoulder pain for quite some time.  He did have an MRI about 10 years ago which showed evidence of some osteoarthritis of the glenohumeral joint and extensive partial-thickness tearing of the rotator cuff.  He has done physical therapy in the past with slight improvement.  Is been getting progressively worse over the last 10 years but is having problems with overhead activities and weakness.  He feels the shoulder gives out on him intermittently as well.  Pain level varies from 0-4 out of 10.  The patient states that the pain is worse with activity and improved by rest. The patient denies numbness and tingling, radicular symptoms, or bowel/bladder dysfunction.

## 2021-08-30 NOTE — REASON FOR VISIT
[Follow-Up Visit] : a follow-up visit for [Shoulder Pain] : shoulder pain [FreeTextEntry2] : Left shoulder 0-4/10 pain. No known injury

## 2021-08-30 NOTE — REVIEW OF SYSTEMS
[Joint Pain] : joint pain [Joint Stiffness] : joint stiffness [Negative] : Heme/Lymph [Joint Swelling] : no joint swelling [Fever] : no fever [Chills] : no chills [Feeling Tired] : no fatigue

## 2021-08-30 NOTE — DISCUSSION/SUMMARY
[de-identified] : This patient presents for evaluation regarding worsening left shoulder pain.  Physical exam and x-rays reveals evidence of advanced osteoarthritis about the left shoulder.  I discussed the diagnosis with the patient at length as well as treatment options.  Patient requires total shoulder replacement to alleviate her symptoms.  Conservative treatment will not work in this patient due to the advanced arthritis.  The risks benefits and alternative treatment plans of the surgical procedure were explained to the patient. The patient had the opportunity to ask any questions which answered to their satisfaction.  The patient would like to think about the surgical procedure.  If he wishes to proceed patient will require an MRI to evaluate his rotator cuff as well as a CAT scan to evaluate significant posterior bone loss and dysplasia about the shoulder for custom component.  At least 30 minutes spent in the evaluation and management of this office visit.

## 2021-09-01 ENCOUNTER — TRANSCRIPTION ENCOUNTER (OUTPATIENT)
Age: 56
End: 2021-09-01

## 2021-09-15 ENCOUNTER — APPOINTMENT (OUTPATIENT)
Dept: ENDOCRINOLOGY | Facility: CLINIC | Age: 56
End: 2021-09-15
Payer: COMMERCIAL

## 2021-09-15 VITALS
TEMPERATURE: 97.7 F | HEART RATE: 75 BPM | BODY MASS INDEX: 37.77 KG/M2 | HEIGHT: 66 IN | WEIGHT: 235 LBS | DIASTOLIC BLOOD PRESSURE: 70 MMHG | RESPIRATION RATE: 16 BRPM | SYSTOLIC BLOOD PRESSURE: 133 MMHG | OXYGEN SATURATION: 97 %

## 2021-09-15 LAB — HBA1C MFR BLD HPLC: 6.7

## 2021-09-15 PROCEDURE — 95251 CONT GLUC MNTR ANALYSIS I&R: CPT

## 2021-09-15 PROCEDURE — 83036 HEMOGLOBIN GLYCOSYLATED A1C: CPT | Mod: QW

## 2021-09-15 PROCEDURE — 99214 OFFICE O/P EST MOD 30 MIN: CPT | Mod: 25

## 2021-09-15 NOTE — HISTORY OF PRESENT ILLNESS
[FreeTextEntry1] : 54 y/o M w/ hx of Type 2 DM diagnosed in 2000 around the time of getting steroids for MS. Prior to 2000 was diagnosed with prediabetes. Started on insulin with steroids. Had been getting infusions every 6 months now on hold for COVID. Infusions are premedicated with steroid so takes sliding scale of Humalog with meals and bedtime which he takes until his blood glucose comes down to normal. Otherwise on metformin 1000 mg BID, Jardiance 25 mg daily, and Victoza 1.8 mg daily. Has been on this regimen since 2019. Had been eating more carbs due to quarantine. Checks glucose 4x/day but having difficulty due to dry skin and irritation from alcohol pads. Since last visit (over the summer) has noticed higher glucose values now 150-180 instead of average glucose 130-135. No reported hypoglycemia or symptoms  of hypoglycemia. +polyuria with neurogenic bladder on Mybetriq, no polydipsia unless eats too much glucose. No nausea or vomiting. No change in weight. \par Follows with podiatry  Dr. Hearn for routine diabetes foot care. No hx of neuropathy in feet. \par No hx of nephropathy\par No hx of retinopathy. Last optho 5/2021.\par \par HTN on valsartan and amlodipine\par \par HLD on statin\par

## 2021-09-15 NOTE — ASSESSMENT
[FreeTextEntry1] : 56 y/o M w/\par \par 1. Uncontrolled Type 2 DM w/ hyperglycemia with recent nonadherence to diet- Discussed the importance of improved glycemic control to prevent long term complications from diabetes. Goal A1c <7% Had been at goal on current regimen when better adherent to diet. His regimen is optimal in terms of medications with low risk of hypoglycemia and additional weight loss, CV, and renal benefits. Would recommend switch Victoza to Saxenda for additional weight loss and glycemic control benefits. Bryan placed at our initial visit and ordered for home use to gain a better sense of overall glycemic control once more adherent to diet. Optho follow-up up to date. 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 Check micro/cr 5/2022.\par \par 2. HTN- monitor BP on amlodipine and valsartan. BP better today, if persistently elevated can increase amlodipine to 10mg daily.\par \par 3. HLD- c/w statin\par \par Prep time with review of labs and interval progress notes and consultations 5 min\par Discussion with patient regarding diabetes regimen with management plan, risks of hyper and hypoglycemia, treatment options and goals of care answering all patients questions and addressing all concerns 15 min\par Discussion of Bryan results in person at the time of the visit with the patient 5 min\par Post-visit completion charting and review 5 min\par Total Time 30 min\par \par Specifically causes, evaluation, treatment options, risks, complications, and benefits of available therapies were discussed. Questions were answered.\par \par The submitted E/M billing level for this visit reflects the total time spent on the day of the visit including face-to-face time spent with the patient, non-face-to-face review of medical records and relevant information, documentation, and asynchronous communication with the patient after a visit via phone, email, or patient’s EHR portal after the visit. \par The medical records reviewed are either scanned into the chart or reviewed with the patient using a patient’s electronic medical records portal for patients with records not available to [facility] via electronic transmission platforms from other institutions and labs. \par Time spend counseling and performing coordination of care was also included in determining the appropriate EM billing level.\par \par I have reviewed and verified information regarding the chief complaint and history recorded by the ancillary staff and/or the patient. I have independently reviewed and interpreted tests performed by other physicians and facilities as necessary. \par \par I have discussed with the patient differential diagnosis, reason for auxiliary tests if ordered, risks, benefits, alternatives, and complications of each form of therapy were discussed.\par

## 2021-09-15 NOTE — PHYSICAL EXAM
[Alert] : alert [Healthy Appearance] : healthy appearance [No Acute Distress] : no acute distress [EOMI] : extra ocular movement intact [Normal Hearing] : hearing was normal [Supple] : the neck was supple [Thyroid Not Enlarged] : the thyroid was not enlarged [No Respiratory Distress] : no respiratory distress [No Accessory Muscle Use] : no accessory muscle use [Normal Rate and Effort] : normal respiratory rate and effort [Clear to Auscultation] : lungs were clear to auscultation bilaterally [Normal S1, S2] : normal S1 and S2 [Normal Rate] : heart rate was normal [Regular Rhythm] : with a regular rhythm [Normal Bowel Sounds] : normal bowel sounds [Not Tender] : non-tender [Not Distended] : not distended [Soft] : abdomen soft [No Clubbing, Cyanosis] : no clubbing  or cyanosis of the fingernails [No Involuntary Movements] : no involuntary movements were seen [Right Foot Was Examined] : right foot ~C was examined [Left Foot Was Examined] : left foot ~C was examined [Normal] : normal [2+] : 2+ in the dorsalis pedis [Oriented x3] : oriented to person, place, and time [Normal Affect] : the affect was normal [Normal Mood] : the mood was normal [Kyphosis] : no kyphosis present [Acanthosis Nigricans] : no acanthosis nigricans [Acne] : no acne [Diminished Throughout Both Feet] : normal tactile sensation with monofilament testing throughout both feet [de-identified] : +trace LE edema b/l [FreeTextEntry6] : healed ulcer on right top 1st toe

## 2021-09-15 NOTE — DATA REVIEWED
[FreeTextEntry1] : Bryan 9/15/2021:  Reviewed and interpreted with values mostly at goal with occasional hyperglycemia occasionally after a meal. No hypoglycemia. At goal 85% of the time. Hyperglycemic 15%. No hypoglycemia. \par \par Labs 9/15/2021:\par A1c 6.7%\par \par Bryan 6/15/2021:  Reviewed and interpreted with values mostly at goal with occasional hyperglycemia occasionally after a meal. No hypoglycemia. At goal 93% of the time. Hyperglycemic 7%\par \par Labs 6/15/2021:\par A1c 6.7%\par \par Bryan 5/3/2021: Reviewed and interpreted with values mostly at goal with occasional hyperglycemia usually early morning or after a meal.\par \par Labs 5/2021:\par Micro/Cr 28\par \par Labs 2/9/2021:\par A1c 7.9%\par CMP normal except glucose of 152\par Vit D 35.5

## 2021-09-24 ENCOUNTER — TRANSCRIPTION ENCOUNTER (OUTPATIENT)
Age: 56
End: 2021-09-24

## 2021-10-01 ENCOUNTER — APPOINTMENT (OUTPATIENT)
Dept: ORTHOPEDIC SURGERY | Facility: CLINIC | Age: 56
End: 2021-10-01
Payer: COMMERCIAL

## 2021-10-01 VITALS
HEIGHT: 66 IN | HEART RATE: 67 BPM | WEIGHT: 235 LBS | SYSTOLIC BLOOD PRESSURE: 126 MMHG | DIASTOLIC BLOOD PRESSURE: 72 MMHG | BODY MASS INDEX: 37.77 KG/M2

## 2021-10-01 PROCEDURE — 99213 OFFICE O/P EST LOW 20 MIN: CPT

## 2021-10-12 ENCOUNTER — OUTPATIENT (OUTPATIENT)
Dept: OUTPATIENT SERVICES | Facility: HOSPITAL | Age: 56
LOS: 1 days | End: 2021-10-12
Payer: COMMERCIAL

## 2021-10-12 ENCOUNTER — APPOINTMENT (OUTPATIENT)
Dept: CT IMAGING | Facility: CLINIC | Age: 56
End: 2021-10-12
Payer: COMMERCIAL

## 2021-10-12 DIAGNOSIS — Z00.8 ENCOUNTER FOR OTHER GENERAL EXAMINATION: ICD-10-CM

## 2021-10-12 PROCEDURE — 73200 CT UPPER EXTREMITY W/O DYE: CPT | Mod: 26,LT

## 2021-10-12 PROCEDURE — 73200 CT UPPER EXTREMITY W/O DYE: CPT

## 2021-10-23 ENCOUNTER — APPOINTMENT (OUTPATIENT)
Dept: MRI IMAGING | Facility: CLINIC | Age: 56
End: 2021-10-23
Payer: COMMERCIAL

## 2021-10-23 PROCEDURE — 73221 MRI JOINT UPR EXTREM W/O DYE: CPT | Mod: LT

## 2021-10-26 ENCOUNTER — APPOINTMENT (OUTPATIENT)
Dept: ORTHOPEDIC SURGERY | Facility: CLINIC | Age: 56
End: 2021-10-26
Payer: COMMERCIAL

## 2021-10-26 VITALS
HEART RATE: 59 BPM | HEIGHT: 66 IN | WEIGHT: 235 LBS | SYSTOLIC BLOOD PRESSURE: 141 MMHG | DIASTOLIC BLOOD PRESSURE: 81 MMHG | BODY MASS INDEX: 37.77 KG/M2

## 2021-10-26 PROCEDURE — 99213 OFFICE O/P EST LOW 20 MIN: CPT

## 2021-10-26 NOTE — PHYSICAL EXAM
[de-identified] : General:\par Awake, alert, no acute distress, Patient was cooperative and appropriate during the examination.\par \par The patient is overweight for height and age.\par \par Walks without an antalgic gait.\par \par Full, painless range of motion of the neck and back.\par \par Exam of the bilateral lower extremities is intact and symmetric with regards to dermatologic, vascular, and neurologic exam. Bilateral lower extremity sensation is grossly intact to light touch in the DP/SP/T/S/S nerve distributions. Intact DF/PF/EHL. BIlateral lower extremities warm and well-perfused with brisk capillary refill.\par \par \par Pulmonary:\par Regular, nonlabored breathing\par \par Abdomen:\par Soft, nontender, nondistended.\par \par Lymphatic:\par No evidence of axillary lymphadenopathy\par \par Left shoulder Exam:\par Physical exam of the shoulder demonstrates normal skin without signs of skin changes or abnormalities. No erythema, warmth, or joint effusion appreciated. \par \par Sensation intact light touch C5-T1\par Palpable radial pulse\par Radial/ulnar/median/axillary/musculocutaneous/AIN/PIN nerves grossly intact\par \par Range of motion:\par Forward Flexion: 160\par Abduction: 110\par External Rotation: 40\par Internal Rotation: Back pocket\par \par Palpation:\par Not tender to palpation over the glenohumeral joint\par Mildly tender palpation over the rotator cuff insertion on the greater tuberosity\par Not tender to palpation over the AC joint\par Not tender to palpation of the biceps tendon/bicipital groove\par \par Strength testing:\par Supraspinatus: 5/5\par Infraspinatus: 5/5\par Subscapularis: 5/5\par \par Special test:\par Empty can test positive\par Sherman impingement test positive\par Speeds test negative\par [place]'s test negative\par Lift-off test negative\par Bell-press test negative\par Cross-arm adduction test negative\par \par  [de-identified] : MRI of the left shoulder from a [facility] imaging facility on 10/23/2021 was reviewed the patient today in the office.The patient has severe glenohumeral osteoarthritis with synovitis, debris, and joint bodies.  There is mild tendinopathy of the rotator cuff which is otherwise intact without any full-thickness tearing.\par \par CT scan of the left shoulder from a [facility] imaging facility on 10/12/2021 was reviewed the patient today in the office.  The patient has severe glenohumeral osteoarthritis with multiple joint bodies within the joint space and within the superior subscapularis recess.  There is mineralization along the region of the extra-articular long head of the biceps tendon sheath may be related to a chronic tear of the long head of the biceps tendon or may be related to joint bodies.  There is an os acromiale with irregularity along its synchondrosis which may represent motion.  There is mild AC joint arthrosis.  There is mild disproportionate fatty infiltration of the teres minor muscle.  Glenoid retroversion is estimated at approximately 30 degrees.\par \par X-rays including 4 views of the left shoulder from another [facility] office on 8/30/2021 reviewed today.  The patient has advanced glenohumeral osteoarthritis with joint space loss, osteophyte formation, and subchondral sclerosis.  There is flattening of the humeral head.  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  The humeral head does not appear to be high riding.

## 2021-10-26 NOTE — HISTORY OF PRESENT ILLNESS
[de-identified] : 10/26/21: Nirmal is a pleasant 55-year-old right-hand-dominant male presents to the office today for follow-up regarding his left shoulder pain and stiffness secondary to glenohumeral osteoarthritis.  He states that his symptoms are relatively unchanged since his previous visit.  He recently had an MRI and CT scans of his shoulder to evaluate his anatomy and he returns today to discuss those results in person with me and any further treatment recommendations.  The patient denies any fevers, chills, sweats, recent illnesses, numbness, tingling, weakness, or pain elsewhere at this time.\par \par 10/1/21: Nirmal is a pleasant 55-year-old right-hand-dominant male who presents to the office today complaining of worsening left shoulder pain and stiffness.  The patient states that he has had significant shoulder pain for the past 10 years.  He denies any history of any trauma or injuries that he can recall.  10 years ago he was treated by another orthopedist for partial tearing of his rotator cuff.  He underwent physical therapy and took NSAIDs which helped him for a period of time.  However, his pain has worsened in recent years and he is losing some function in the arm.  His pain is activity related and alleviated by rest.  It hurts to reach overhead or when he sleeps.  He recently saw Dr. Chavez who ordered x-rays which demonstrate significant glenohumeral osteoarthritis and retroversion of the glenoid.  The CT scan wasordered to further delineate his bony morphology for possible presurgical planning purposes and consideration for shoulder arthroplasty.  The patient presents to me today for a second opinion regarding this matter.  The patient denies any fevers, chills, sweats, recent illnesses, numbness, tingling, weakness, or pain elsewhere at this time.\par

## 2021-10-26 NOTE — DISCUSSION/SUMMARY
[de-identified] : Assessment: 55-year-old male with left shoulder pain secondary to severe glenohumeral osteoarthritis and significant retroversion of his glenoid\par \par Plan: I had a long discussion with the patient today regarding the nature of their diagnosis and treatment plan.  I reviewed the patient's CT and MRI scans today with him in the office.  His rotator cuff remains intact but he has significant osteoarthritis with associated joint bodies and osteophytes.  His glenoid retroversion is estimated at approximately 30 degrees on the CT scan.  We discussed the risks and benefits of no treatment as well as nonoperative and operative treatments.  Nonsurgical treatment would include modalities such as resting, icing, heating, stretching, anti-inflammatory medicines as needed, activity modifications, lifestyle modifications, home exercises, physical therapy, and possible cortisone injections.  The benefits of nonsurgical treatment of that it may alleviate some of his pain while avoiding the risks and rehabilitation associated with surgery.  The disadvantages of nonsurgical treatment are that it may incompletely alleviate his symptoms of stiffness and he may continue to still have pain.  Surgical treatment options would include a left anatomic shoulder arthroplasty using an inlay glenoid versus a reverse total shoulder arthroplasty.  We discussed the risks and benefits of both of these procedures in detail.  Given the patient's age and likely need for revision procedure in the future, I recommend against a reverse total shoulder replacement at this time.  I also explained that given the patient's significant retroversion of his glenoid I do not believe a anatomic shoulder replacement with a standard cemented glenoid component would have the necessary longevity either.  An anatomic total shoulder replacement with an inlay glenoid is an alternative option that is bone preserving and is lysed likely to be subjected to the same shear forces that a standard glenoid component would be subjective given his anatomy.  In the glenoid component also has very good short-term results with regards to patient outcomes.  The downside of the inlay glenoid option, is that there is no long-term data studying it survivability beyond 5 years.  Therefore, there is a small risk of the need for revision procedure in the future for implant loosening that is at this point unknown.  The risks of the surgeries include but not limited to infection, blood loss, blood clots, neurovascular injury, stiffness/loss range of motion, persistent pain, progressive arthritis, fracture, instability, anesthesia related complications including paralysis and death, and the need for further surgery in the future.  Postoperatively the patient be expected to be in a sling for approximately 4 weeks.  He will not be permitted to drive while in a sling oral taking her chronic pain medication.  Physical therapy will start 2 weeks after surgery and generally last for 4 to 6 months.  Failure to adhere to any postoperative restrictions and/or comply with physical therapy could lead to a poor outcome or surgical failure.  The patient verbalizes understanding of all of these options and their associated risks and would like to take time to consider these options further before making a decision.  He will call my office once he has made a decision if he would like to proceed with surgery.\par \par The patient verbalizes their understanding and agrees with the plan.  All questions were answered to their satisfaction.

## 2021-11-12 ENCOUNTER — APPOINTMENT (OUTPATIENT)
Dept: PULMONOLOGY | Facility: CLINIC | Age: 56
End: 2021-11-12
Payer: COMMERCIAL

## 2021-11-12 VITALS
RESPIRATION RATE: 16 BRPM | SYSTOLIC BLOOD PRESSURE: 128 MMHG | HEART RATE: 71 BPM | WEIGHT: 240 LBS | HEIGHT: 65 IN | TEMPERATURE: 97.3 F | DIASTOLIC BLOOD PRESSURE: 80 MMHG | BODY MASS INDEX: 39.99 KG/M2 | OXYGEN SATURATION: 98 %

## 2021-11-12 PROCEDURE — 99214 OFFICE O/P EST MOD 30 MIN: CPT | Mod: 25

## 2021-11-12 PROCEDURE — 94618 PULMONARY STRESS TESTING: CPT

## 2021-11-12 NOTE — ASSESSMENT
[FreeTextEntry1] : Mr. OLIVER is a 55 year old male with a history of HLD, RADS, HTN, DM, MS, JERRELL, and allergies who comes into the office today for a follow up pulmonary evaluation - His number one issue is still weight. (improving) - in progress \par \par The patient's shortness of breath is multifactorial due to:\par -pulmonary disease \par      -has risk factors for asthma (allergy)\par -poor breathing mechanics \par -overweight/out of shape\par -CAD \par \par Problem 1: Allergy / Sinus - active\par -Continue using Zyrtec 5 mg QHS \par -Environmental measures for allergies were encouraged including mattress and pillow cover, air purifier, and environmental controls. \par \par Problem 2: JERRELL (compliant) \par -s/p split night sleep study for memory concentration, nocturia, daily fatigue, metabolism.\par -Resmed Fx wide full nasal mask (medium), with 8 cm pressure ([facility])\par \par -Sleep apnea is associated with adverse clinical consequences which an affect most organ systems. Cardiovascular disease risk includes arrhythmias, atrial fibrillation, hypertension, coronary artery disease, and stroke. Metabolic disorders include diabetes type 2, non-alcoholic fatty liver disease. Mood disorder especially depression; and cognitive decline especially in the elderly. Associations with chronic reflux/Morris’s esophagus some but not all inclusive. \par -Reasons include arousal consistent with hypopnea; respiratory events most prominent in REM sleep or supine position; therefore sleep staging and body position are important for accurate diagnosis and estimation of AHI. \par \par Problem 3: Cardiac\par -Recommended to follow up with cardiologist if needed\par \par Problem 4: Poor Mechanics of Breathing\par -Recommended Wim Hof and Buteyko breathing techniques \par - Proper breathing techniques were reviewed with an emphasis of exhalation. Patient instructed to breath in for 1 second and out for four seconds. Patient was encouraged to not talk while walking. \par \par Problem 5: overweight/out of shape\par -Recommend "Muniq" OTC Supplement for weight loss, energy levels, and blood sugar levels \par - Weight loss, exercise and diet control were discussed and are highly encouraged. Treatment options were given such as aqua therapy, and contacting a nutritionist. Recommended to use the elliptical, stationary bike, less use of treadmill. Mindful eating was explained to the patient. Obesity is associated with worsening asthma, SOB, and potential for cardiac disease, diabetes, and other underlying medical conditions.\par \par Problem 6: Health Maintenance/COVID19 Precautions:\par -s/p Pfizer COVID 19 vaccine x 3 (10/2021)\par - Clean your hands often. Wash your hands often with soap and water for at least 20 seconds, especially after blowing your nose, coughing, or sneezing, or having been in a public place.\par - If soap and water are not available, use a hand  that contains at least 60% alcohol.\par - To the extent possible, avoid touching high-touch surfaces in public places - elevator buttons, door handles, handrails, handshaking with people, etc. Use a tissue or your sleeve to cover your hand or finger if you must touch something.\par - Wash your hands after touching surfaces in public places.\par - Avoid touching your face, nose, eyes, etc.\par - Clean and disinfect your home to remove germs: practice routine cleaning of frequently touched surfaces (for example: tables, doorknobs, light switches, handles, desks, toilets, faucets, sinks & cell phones)\par - Avoid crowds, especially in poorly ventilated spaces. Your risk of exposure to respiratory viruses like COVID-19 may increase in crowded, closed-in settings with little air circulation if there are people in the crowd who are sick. All patients are recommended to practice social distancing and stay at least 6 feet away from others.\par - Avoid all non-essential travel including plane trips, and especially avoid embarking on cruise ships.\par -If COVID-19 is spreading in your community, take extra measures to put distance between yourself and other people to further reduce your risk of being exposed to this new virus.\par -Stay home as much as possible.\par - Consider ways of getting food brought to your house through family, social, or commercial networks\par -Be aware that the virus has been known to live in the air up to 3 hours post exposure, cardboard up to 24 hours post exposure, copper up to 4 hours post exposure, steel and plastic up to 2-3 days post exposure. Risk of transmission from these surfaces are affected by many variables.\par Immune Support Recommendations:\par -OTC Vitamin C 500mg BID \par -OTC Quercetin 250-500mg BID \par -OTC Zinc 75-100mg per day \par -OTC Melatonin 1 or 2 mg a night \par -OTC Vitamin D 1-4000mg per day \par -OTC Tonic Water 8oz per day\par Asthma and COVID19:\par You need to make sure your asthma is under control. This often requires the use of inhaled corticosteroids (and sometimes oral corticosteroids). Inhaled corticosteroids do not likely reduce your immune system’s ability to fight infections, but oral corticosteroids may. It is important to use the steps above to protect yourself to limit your exposure to any respiratory virus.\par \par Problem 7: health maintenance\par -Recommend Dr. Jordan Epps (shoulder surgeon)\par -s/p influenza vaccine - 2021\par -recommended strep pneumonia vaccines after age 65: Prevnar-13 vaccine, followed by Pneumo vaccine 23 one year following\par -recommended early intervention for URIs\par -recommended regular osteoporosis evaluations\par -recommended early dermatological evaluations\par -recommended after the age of 50 to the age of 70, colonoscopy every 5 years \par \par \par  Follow up in 4 months - [facility]\par -he  is recommended to call with any changes, questions, or concerns.

## 2021-11-12 NOTE — ADDENDUM
[FreeTextEntry1] : Documented by Dae Tavares acting as a scribe for Dr. Pancho Byrne on (11/12/2021).\par \par All medical record entries made by the Scribe were at my, Dr. Pancho Byrne's, direction and personally dictated by me on (11/12/2021). I have reviewed the chart and agree that the record accurately reflects my personal performance of the history, physical exam, assessment and plan. I have also personally directed, reviewed, and agree with the discharge instructions.\par

## 2021-11-12 NOTE — PROCEDURE
[FreeTextEntry1] : 6 minute walk test reveals a low saturation of 96% with slight dyspnea or fatigue; walked 489 meters\par  \par -Images and procedures reviewed in detail and discussed with patient.

## 2021-11-12 NOTE — HISTORY OF PRESENT ILLNESS
[FreeTextEntry1] : Mr. OLIVER is a 55 year old male with a history of obesity, post nasal drip, and SOB, presenting to the office today for a follow up pulmonary evaluation. His chief complaint is\par -he notes feeling good in general\par -he notes getting an average of 6.5 hours on the CPAP per night\par -he notes some itchy eyes lately\par -he denies any hoarseness\par -s/p COVID 19 vaccine x2 (10/2021)\par -he notes his memory and concentration could be better\par -he notes his exercise consists of walking his dog 3-5x per day\par -he notes his weight is going down slowly\par -no new medications, vitamins, or supplements\par -he notes his sleep is interrupted by nocturia\par -he denies noticing anything apparent with his MS\par \par patient denies any headaches, nausea, vomiting, fever, chills, sweats, chest pain, chest pressure, palpitations, coughing, wheezing, fatigue, diarrhea, constipation, dysphagia, myalgias, dizziness, leg swelling, leg pain, itchy ears, heartburn, reflux or sour taste in the mouth

## 2021-11-15 ENCOUNTER — TRANSCRIPTION ENCOUNTER (OUTPATIENT)
Age: 56
End: 2021-11-15

## 2021-11-17 ENCOUNTER — TRANSCRIPTION ENCOUNTER (OUTPATIENT)
Age: 56
End: 2021-11-17

## 2021-11-30 ENCOUNTER — APPOINTMENT (OUTPATIENT)
Dept: INTERNAL MEDICINE | Facility: CLINIC | Age: 56
End: 2021-11-30
Payer: COMMERCIAL

## 2021-11-30 VITALS
WEIGHT: 236 LBS | HEART RATE: 75 BPM | BODY MASS INDEX: 39.32 KG/M2 | OXYGEN SATURATION: 99 % | TEMPERATURE: 98 F | HEIGHT: 65 IN

## 2021-11-30 VITALS — SYSTOLIC BLOOD PRESSURE: 122 MMHG | DIASTOLIC BLOOD PRESSURE: 68 MMHG

## 2021-11-30 DIAGNOSIS — Z23 ENCOUNTER FOR IMMUNIZATION: ICD-10-CM

## 2021-11-30 PROCEDURE — 99213 OFFICE O/P EST LOW 20 MIN: CPT | Mod: 25

## 2021-11-30 PROCEDURE — G0009: CPT

## 2021-11-30 PROCEDURE — 90670 PCV13 VACCINE IM: CPT

## 2021-11-30 RX ORDER — OCRELIZUMAB 300 MG/10ML
INJECTION INTRAVENOUS
Refills: 0 | Status: ACTIVE | COMMUNITY

## 2021-11-30 NOTE — HISTORY OF PRESENT ILLNESS
[FreeTextEntry1] : f/u med issues [de-identified] : seeing ortho for left shoulder pain\par endo notes reviewed\par just had full labs thru [facility] prior to [facility] infusion\par meds reviewed and renewed as nec.

## 2021-12-07 ENCOUNTER — TRANSCRIPTION ENCOUNTER (OUTPATIENT)
Age: 56
End: 2021-12-07

## 2022-01-18 ENCOUNTER — APPOINTMENT (OUTPATIENT)
Dept: INTERNAL MEDICINE | Facility: CLINIC | Age: 57
End: 2022-01-18
Payer: COMMERCIAL

## 2022-01-18 VITALS
SYSTOLIC BLOOD PRESSURE: 136 MMHG | WEIGHT: 237 LBS | DIASTOLIC BLOOD PRESSURE: 83 MMHG | HEART RATE: 83 BPM | BODY MASS INDEX: 39.49 KG/M2 | TEMPERATURE: 98.8 F | HEIGHT: 65 IN | OXYGEN SATURATION: 97 %

## 2022-01-18 DIAGNOSIS — R10.9 UNSPECIFIED ABDOMINAL PAIN: ICD-10-CM

## 2022-01-18 PROCEDURE — 99213 OFFICE O/P EST LOW 20 MIN: CPT

## 2022-01-18 NOTE — HISTORY OF PRESENT ILLNESS
[FreeTextEntry1] : groin issue [de-identified] : around 2 wks developed localized discomfort in right groin with lump for about 2 days--resolved--no GI symps or severe pain\par delaying shoulder surgery\par same meds

## 2022-01-18 NOTE — PHYSICAL EXAM
[Normal] : normal rate, regular rhythm, normal S1 and S2 and no murmur heard [de-identified] : no definite hernia--no testicular mass

## 2022-01-18 NOTE — ASSESSMENT
[FreeTextEntry1] : likely inguinal hernia--not documentable now\par \par pt wants to defer further testing unless recurrent symptoms\par will contact provider\par cpx mid spring 2022--seeing endo next wk

## 2022-01-19 ENCOUNTER — APPOINTMENT (OUTPATIENT)
Dept: ENDOCRINOLOGY | Facility: CLINIC | Age: 57
End: 2022-01-19
Payer: COMMERCIAL

## 2022-01-19 VITALS
TEMPERATURE: 98 F | BODY MASS INDEX: 39.99 KG/M2 | OXYGEN SATURATION: 96 % | DIASTOLIC BLOOD PRESSURE: 80 MMHG | SYSTOLIC BLOOD PRESSURE: 168 MMHG | WEIGHT: 240 LBS | HEART RATE: 73 BPM | HEIGHT: 65 IN

## 2022-01-19 LAB — HBA1C MFR BLD HPLC: 7.3

## 2022-01-19 PROCEDURE — 99214 OFFICE O/P EST MOD 30 MIN: CPT | Mod: 25

## 2022-01-19 PROCEDURE — 83036 HEMOGLOBIN GLYCOSYLATED A1C: CPT | Mod: QW

## 2022-01-19 PROCEDURE — 95251 CONT GLUC MNTR ANALYSIS I&R: CPT

## 2022-01-19 NOTE — DATA REVIEWED
[FreeTextEntry1] : Bryan 1/19/2022:  Reviewed and interpreted with values mostly at goal with occasional hyperglycemia occasionally after a meal. No hypoglycemia. At goal 84% of the time. Hyperglycemic 16%. No hypoglycemia. \par \par Labs 1/19/2022:\par A1c 7.3%\par \par Bryan 9/15/2021:  Reviewed and interpreted with values mostly at goal with occasional hyperglycemia occasionally after a meal. No hypoglycemia. At goal 85% of the time. Hyperglycemic 15%. No hypoglycemia. \par \par Labs 9/15/2021:\par A1c 6.7%\par \par Bryan 6/15/2021:  Reviewed and interpreted with values mostly at goal with occasional hyperglycemia occasionally after a meal. No hypoglycemia. At goal 93% of the time. Hyperglycemic 7%\par \par Labs 6/15/2021:\par A1c 6.7%\par \par Bryan 5/3/2021: Reviewed and interpreted with values mostly at goal with occasional hyperglycemia usually early morning or after a meal.\par \par Labs 5/2021:\par Micro/Cr 28\par \par Labs 2/9/2021:\par A1c 7.9%\par CMP normal except glucose of 152\par Vit D 35.5

## 2022-01-19 NOTE — HISTORY OF PRESENT ILLNESS
[FreeTextEntry1] : 57 y/o M w/ hx of Type 2 DM diagnosed in 2000 around the time of getting steroids for MS. Prior to 2000 was diagnosed with prediabetes. Started on insulin with steroids. Had been getting infusions every 6 months was on hold for COVID. Infusions are premedicated with steroid so takes sliding scale of Humalog with meals and bedtime which he takes until his blood glucose comes down to normal. Last infusion 11/2021. Next infusion scheduled for 5/2022. Otherwise on metformin 1000 mg BID, Jardiance 25 mg daily, and Saxenda (switched from Victoza 1.8 mg daily for additional weight loss benefits 9/2021). Had been eating more carbs due to quarantine. Checks glucose 4x/day but having difficulty due to dry skin and irritation from alcohol pads. Since last visit has noticed higher glucose values now 150-180 instead of average glucose 130-135. No reported hypoglycemia or symptoms  of hypoglycemia. +polyuria with neurogenic bladder on Mybetriq, no polydipsia unless eats too much glucose. No nausea or vomiting. No change in weight. \par Follows with podiatry  Dr. Hearn for routine diabetes foot care. No hx of neuropathy in feet. \par No hx of nephropathy\par No hx of retinopathy. Last retinal exam 10/2021. \par \par HTN on valsartan and amlodipine\par \par HLD on statin\par

## 2022-01-19 NOTE — ASSESSMENT
[FreeTextEntry1] : 57 y/o M w/\par \par 1. Uncontrolled Type 2 DM w/ hyperglycemia- Discussed the importance of improved glycemic control to prevent long term complications from diabetes. Goal A1c <7% Had been at goal on current regimen when better adherent to diet. His regimen is optimal in terms of medications with low risk of hypoglycemia and additional weight loss, CV, and renal benefits. Would recommend optimize dose of Saxenda to goal of 3mg for additional weight loss and glycemic control benefits. Bryan placed at our initial visit and ordered for home use to gain a better sense of overall glycemic control once more adherent to diet. Optho follow-up up to date. 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 Check micro/cr 5/2022.\par \par 2. HTN- monitor BP on amlodipine and valsartan. BP above goal today, if persistently elevated can increase amlodipine to 10mg daily.\par \par 3. HLD- c/w statin\par

## 2022-01-19 NOTE — PHYSICAL EXAM
[Alert] : alert [Healthy Appearance] : healthy appearance [No Acute Distress] : no acute distress [EOMI] : extra ocular movement intact [Normal Hearing] : hearing was normal [Supple] : the neck was supple [Thyroid Not Enlarged] : the thyroid was not enlarged [No Respiratory Distress] : no respiratory distress [No Accessory Muscle Use] : no accessory muscle use [Normal Rate and Effort] : normal respiratory rate and effort [Clear to Auscultation] : lungs were clear to auscultation bilaterally [Normal S1, S2] : normal S1 and S2 [Normal Rate] : heart rate was normal [Regular Rhythm] : with a regular rhythm [Normal Bowel Sounds] : normal bowel sounds [Not Tender] : non-tender [Not Distended] : not distended [Soft] : abdomen soft [No Clubbing, Cyanosis] : no clubbing  or cyanosis of the fingernails [No Involuntary Movements] : no involuntary movements were seen [Normal] : normal [2+] : 2+ in the dorsalis pedis [Oriented x3] : oriented to person, place, and time [Normal Affect] : the affect was normal [Normal Mood] : the mood was normal [Kyphosis] : no kyphosis present [Acanthosis Nigricans] : no acanthosis nigricans [Acne] : no acne [Diminished Throughout Both Feet] : normal tactile sensation with monofilament testing throughout both feet [de-identified] : +trace LE edema b/l [FreeTextEntry6] : healed ulcer on right top 1st toe

## 2022-02-03 ENCOUNTER — APPOINTMENT (OUTPATIENT)
Dept: ORTHOPEDIC SURGERY | Facility: CLINIC | Age: 57
End: 2022-02-03

## 2022-02-22 ENCOUNTER — TRANSCRIPTION ENCOUNTER (OUTPATIENT)
Age: 57
End: 2022-02-22

## 2022-03-02 ENCOUNTER — TRANSCRIPTION ENCOUNTER (OUTPATIENT)
Age: 57
End: 2022-03-02

## 2022-03-29 ENCOUNTER — APPOINTMENT (OUTPATIENT)
Dept: ORTHOPEDIC SURGERY | Facility: CLINIC | Age: 57
End: 2022-03-29
Payer: COMMERCIAL

## 2022-03-29 VITALS
HEIGHT: 65 IN | SYSTOLIC BLOOD PRESSURE: 138 MMHG | WEIGHT: 240 LBS | DIASTOLIC BLOOD PRESSURE: 84 MMHG | HEART RATE: 70 BPM | BODY MASS INDEX: 39.99 KG/M2

## 2022-03-29 DIAGNOSIS — M19.012 PRIMARY OSTEOARTHRITIS, LEFT SHOULDER: ICD-10-CM

## 2022-03-29 PROCEDURE — 99214 OFFICE O/P EST MOD 30 MIN: CPT

## 2022-03-29 NOTE — PHYSICAL EXAM
[de-identified] : General:\par Awake, alert, no acute distress, Patient was cooperative and appropriate during the examination.\par \par The patient is overweight for height and age.\par \par Walks without an antalgic gait.\par \par Full, painless range of motion of the neck and back.\par \par Exam of the bilateral lower extremities is intact and symmetric with regards to dermatologic, vascular, and neurologic exam. Bilateral lower extremity sensation is grossly intact to light touch in the DP/SP/T/S/S nerve distributions. Intact DF/PF/EHL. BIlateral lower extremities warm and well-perfused with brisk capillary refill.\par \par \par Pulmonary:\par Regular, nonlabored breathing\par \par Abdomen:\par Soft, nontender, nondistended.\par \par Lymphatic:\par No evidence of axillary lymphadenopathy\par \par Left shoulder Exam:\par Physical exam of the shoulder demonstrates normal skin without signs of skin changes or abnormalities. No erythema, warmth, or joint effusion appreciated. \par \par Sensation intact light touch C5-T1\par Palpable radial pulse\par Radial/ulnar/median/axillary/musculocutaneous/AIN/PIN nerves grossly intact\par \par Range of motion:\par Forward Flexion: 160\par Abduction: 110\par External Rotation: 40\par Internal Rotation: Back pocket\par \par Palpation:\par Not tender to palpation over the glenohumeral joint\par Mildly tender palpation over the rotator cuff insertion on the greater tuberosity\par Not tender to palpation over the AC joint\par Not tender to palpation of the biceps tendon/bicipital groove\par \par Strength testing:\par Supraspinatus: 5/5\par Infraspinatus: 5/5\par Subscapularis: 5/5\par \par Special test:\par Empty can test positive\par Sherman impingement test positive\par Speeds test negative\par Darke's test negative\par Lift-off test negative\par Bell-press test negative\par Cross-arm adduction test negative\par \par  [de-identified] : MRI of the left shoulder from a [facility] imaging facility on 10/23/2021 was reviewed the patient today in the office.The patient has severe glenohumeral osteoarthritis with synovitis, debris, and joint bodies.  There is mild tendinopathy of the rotator cuff which is otherwise intact without any full-thickness tearing.\par \par CT scan of the left shoulder from a [facility] imaging facility on 10/12/2021 was reviewed the patient today in the office.  The patient has severe glenohumeral osteoarthritis with multiple joint bodies within the joint space and within the superior subscapularis recess.  There is mineralization along the region of the extra-articular long head of the biceps tendon sheath may be related to a chronic tear of the long head of the biceps tendon or may be related to joint bodies.  There is an os acromiale with irregularity along its synchondrosis which may represent motion.  There is mild AC joint arthrosis.  There is mild disproportionate fatty infiltration of the teres minor muscle.  Glenoid retroversion is estimated at approximately 30 degrees.\par \par X-rays including 4 views of the left shoulder from another [facility] office on 8/30/2021 reviewed today.  The patient has advanced glenohumeral osteoarthritis with joint space loss, osteophyte formation, and subchondral sclerosis.  There is flattening of the humeral head.  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  The humeral head does not appear to be high riding.

## 2022-03-29 NOTE — DISCUSSION/SUMMARY
[de-identified] : Assessment: 56-year-old male with left shoulder pain secondary to severe glenohumeral osteoarthritis and significant retroversion of his glenoid\par \par Plan: I had a long discussion with the patient today regarding the nature of their diagnosis and treatment plan.  I reviewed the patient's CT and MRI scans today with him in the office.  His rotator cuff remains intact but he has significant osteoarthritis with associated joint bodies and osteophytes.  His glenoid retroversion is estimated at approximately 30 degrees on the CT scan.  We discussed the risks and benefits of no treatment as well as nonoperative and operative treatments.  Nonsurgical treatment would include modalities such as resting, icing, heating, stretching, anti-inflammatory medicines as needed, activity modifications, lifestyle modifications, home exercises, physical therapy, and possible cortisone injections.  The benefits of nonsurgical treatment of that it may alleviate some of his pain while avoiding the risks and rehabilitation associated with surgery.  The disadvantages of nonsurgical treatment are that it may incompletely alleviate his symptoms of stiffness and he may continue to still have pain.  Surgical treatment options would include a left anatomic shoulder arthroplasty using an inlay glenoid versus a reverse total shoulder arthroplasty.  We discussed the risks and benefits of both of these procedures in detail.  Given the patient's age and likely need for revision procedure in the future, I recommend against a reverse total shoulder replacement at this time.  I also explained that given the patient's significant retroversion of his glenoid I do not believe a anatomic shoulder replacement with a standard cemented glenoid component would have the necessary longevity either.  An anatomic total shoulder replacement with an inlay glenoid is an alternative option that is bone preserving and is less likely to be subjected to the same shear forces that a standard glenoid component would be subjective given his anatomy.  In the glenoid component also has very good short-term results with regards to patient outcomes.  The downside of the inlay glenoid option, is that there is no long-term data studying it survivability beyond 5 years.  Therefore, there is a small risk of the need for revision procedure in the future for implant loosening that is at this point unknown.  The risks of the surgeries include but not limited to infection, blood loss, blood clots, neurovascular injury, stiffness/loss range of motion, persistent pain, progressive arthritis, fracture, instability, anesthesia related complications including paralysis and death, and the need for further surgery in the future.  Postoperatively the patient be expected to be in a sling for approximately 4 weeks.  He will not be permitted to drive while in a sling oral taking her chronic pain medication.  Physical therapy will start 2 weeks after surgery and generally last for 4 to 6 months.  Failure to adhere to any postoperative restrictions and/or comply with physical therapy could lead to a poor outcome or surgical failure.  The patient verbalizes understanding of all of these options and their associated risks and would like to take time to consider these options further before making a decision.  He will schedule his surgery with our surgical coordinator.  He will get a repeat CT first for presurgical planning purposes as the previous one is old and outdated at this point. \par \par The patient verbalizes their understanding and agrees with the plan.  All questions were answered to their satisfaction.

## 2022-03-29 NOTE — HISTORY OF PRESENT ILLNESS
[de-identified] : 03/29/2022 : NIRMAL OLIVER  is a 56 year year old male who presents to the office for follow-up evaluation of his left shoulder today.  He states that he canceled the surgery previously because of the Covid spike.  He would like to consider surgery again.  He is here to discuss surgery.  He has no other complaints today.  He states his symptoms are unchanged since the last visit.  The patient denies any fevers, chills, sweats, recent illnesses, numbness, tingling, weakness, or pain elsewhere at this time.\par \par 10/26/21: Nirmal is a pleasant 55-year-old right-hand-dominant male presents to the office today for follow-up regarding his left shoulder pain and stiffness secondary to glenohumeral osteoarthritis.  He states that his symptoms are relatively unchanged since his previous visit.  He recently had an MRI and CT scans of his shoulder to evaluate his anatomy and he returns today to discuss those results in person with me and any further treatment recommendations.  The patient denies any fevers, chills, sweats, recent illnesses, numbness, tingling, weakness, or pain elsewhere at this time.\par \par 10/1/21: Nirmal is a pleasant 55-year-old right-hand-dominant male who presents to the office today complaining of worsening left shoulder pain and stiffness.  The patient states that he has had significant shoulder pain for the past 10 years.  He denies any history of any trauma or injuries that he can recall.  10 years ago he was treated by another orthopedist for partial tearing of his rotator cuff.  He underwent physical therapy and took NSAIDs which helped him for a period of time.  However, his pain has worsened in recent years and he is losing some function in the arm.  His pain is activity related and alleviated by rest.  It hurts to reach overhead or when he sleeps.  He recently saw Dr. Chavez who ordered x-rays which demonstrate significant glenohumeral osteoarthritis and retroversion of the glenoid.  The CT scan wasordered to further delineate his bony morphology for possible presurgical planning purposes and consideration for shoulder arthroplasty.  The patient presents to me today for a second opinion regarding this matter.  The patient denies any fevers, chills, sweats, recent illnesses, numbness, tingling, weakness, or pain elsewhere at this time.\par

## 2022-04-06 ENCOUNTER — OUTPATIENT (OUTPATIENT)
Dept: OUTPATIENT SERVICES | Facility: HOSPITAL | Age: 57
LOS: 1 days | End: 2022-04-06
Payer: COMMERCIAL

## 2022-04-06 ENCOUNTER — APPOINTMENT (OUTPATIENT)
Dept: CT IMAGING | Facility: CLINIC | Age: 57
End: 2022-04-06
Payer: COMMERCIAL

## 2022-04-06 DIAGNOSIS — Z00.00 ENCOUNTER FOR GENERAL ADULT MEDICAL EXAMINATION WITHOUT ABNORMAL FINDINGS: ICD-10-CM

## 2022-04-06 PROCEDURE — 73200 CT UPPER EXTREMITY W/O DYE: CPT

## 2022-04-06 PROCEDURE — 73200 CT UPPER EXTREMITY W/O DYE: CPT | Mod: 26,LT

## 2022-04-20 NOTE — DISCHARGE NOTE ADULT - DURABLE MEDICAL EQUIPMENT AGENCY
Patient notified of provider's message as written below. He reports that he is interested in the Eliquis. He Is a  and his schedule is all over the place and getting to INR is sometimes troublesome. The mediation with his insurance is $500 a month. He called hotline for Eliquis and they have a program for $10 for 2 years program. He can then sign up for another 2 years after that if it is going well.  From here is is going to call them back and tell them that Eliquis is an option for him and he will get a plan from them on what we need to do for them for this medication to be covered by this plan. He is aware that he will need to work with INR clinic to get his INR under 2 before starting this medication. Patient verbalized good understanding, agrees with plan and needs no further support.  Thank you. Lucy Gunderson R.N.            [facility] [phone]: [place], [place]

## 2022-04-25 ENCOUNTER — OUTPATIENT (OUTPATIENT)
Dept: OUTPATIENT SERVICES | Facility: HOSPITAL | Age: 57
LOS: 1 days | End: 2022-04-25
Payer: COMMERCIAL

## 2022-04-25 VITALS
TEMPERATURE: 98 F | OXYGEN SATURATION: 96 % | SYSTOLIC BLOOD PRESSURE: 120 MMHG | WEIGHT: 237.44 LBS | DIASTOLIC BLOOD PRESSURE: 80 MMHG | HEART RATE: 91 BPM | RESPIRATION RATE: 20 BRPM | HEIGHT: 66 IN

## 2022-04-25 DIAGNOSIS — G47.33 OBSTRUCTIVE SLEEP APNEA (ADULT) (PEDIATRIC): ICD-10-CM

## 2022-04-25 DIAGNOSIS — I10 ESSENTIAL (PRIMARY) HYPERTENSION: ICD-10-CM

## 2022-04-25 DIAGNOSIS — Z98.890 OTHER SPECIFIED POSTPROCEDURAL STATES: Chronic | ICD-10-CM

## 2022-04-25 DIAGNOSIS — M25.512 PAIN IN LEFT SHOULDER: ICD-10-CM

## 2022-04-25 DIAGNOSIS — Z01.818 ENCOUNTER FOR OTHER PREPROCEDURAL EXAMINATION: ICD-10-CM

## 2022-04-25 DIAGNOSIS — Z91.89 OTHER SPECIFIED PERSONAL RISK FACTORS, NOT ELSEWHERE CLASSIFIED: ICD-10-CM

## 2022-04-25 DIAGNOSIS — E11.9 TYPE 2 DIABETES MELLITUS WITHOUT COMPLICATIONS: ICD-10-CM

## 2022-04-25 LAB
A1C WITH ESTIMATED AVERAGE GLUCOSE RESULT: 6.8 % — HIGH (ref 4–5.6)
ALBUMIN SERPL ELPH-MCNC: 4.6 G/DL — SIGNIFICANT CHANGE UP (ref 3.3–5.2)
ALP SERPL-CCNC: 68 U/L — SIGNIFICANT CHANGE UP (ref 40–120)
ALT FLD-CCNC: 38 U/L — SIGNIFICANT CHANGE UP
ANION GAP SERPL CALC-SCNC: 14 MMOL/L — SIGNIFICANT CHANGE UP (ref 5–17)
APTT BLD: 35 SEC — SIGNIFICANT CHANGE UP (ref 27.5–35.5)
AST SERPL-CCNC: 25 U/L — SIGNIFICANT CHANGE UP
BASOPHILS # BLD AUTO: 0.05 K/UL — SIGNIFICANT CHANGE UP (ref 0–0.2)
BASOPHILS NFR BLD AUTO: 0.7 % — SIGNIFICANT CHANGE UP (ref 0–2)
BILIRUB SERPL-MCNC: 0.2 MG/DL — LOW (ref 0.4–2)
BLD GP AB SCN SERPL QL: SIGNIFICANT CHANGE UP
BUN SERPL-MCNC: 22.9 MG/DL — HIGH (ref 8–20)
CALCIUM SERPL-MCNC: 10 MG/DL — SIGNIFICANT CHANGE UP (ref 8.6–10.2)
CHLORIDE SERPL-SCNC: 102 MMOL/L — SIGNIFICANT CHANGE UP (ref 98–107)
CO2 SERPL-SCNC: 24 MMOL/L — SIGNIFICANT CHANGE UP (ref 22–29)
CREAT SERPL-MCNC: 0.78 MG/DL — SIGNIFICANT CHANGE UP (ref 0.5–1.3)
EGFR: 103 ML/MIN/1.73M2 — SIGNIFICANT CHANGE UP
EOSINOPHIL # BLD AUTO: 0.31 K/UL — SIGNIFICANT CHANGE UP (ref 0–0.5)
EOSINOPHIL NFR BLD AUTO: 4.5 % — SIGNIFICANT CHANGE UP (ref 0–6)
ESTIMATED AVERAGE GLUCOSE: 148 MG/DL — HIGH (ref 68–114)
GLUCOSE SERPL-MCNC: 133 MG/DL — HIGH (ref 70–99)
HCT VFR BLD CALC: 47.8 % — SIGNIFICANT CHANGE UP (ref 39–50)
HGB BLD-MCNC: 15.5 G/DL — SIGNIFICANT CHANGE UP (ref 13–17)
IMM GRANULOCYTES NFR BLD AUTO: 0.3 % — SIGNIFICANT CHANGE UP (ref 0–1.5)
INR BLD: 0.97 RATIO — SIGNIFICANT CHANGE UP (ref 0.88–1.16)
LYMPHOCYTES # BLD AUTO: 2.37 K/UL — SIGNIFICANT CHANGE UP (ref 1–3.3)
LYMPHOCYTES # BLD AUTO: 34.6 % — SIGNIFICANT CHANGE UP (ref 13–44)
MCHC RBC-ENTMCNC: 28.4 PG — SIGNIFICANT CHANGE UP (ref 27–34)
MCHC RBC-ENTMCNC: 32.4 GM/DL — SIGNIFICANT CHANGE UP (ref 32–36)
MCV RBC AUTO: 87.7 FL — SIGNIFICANT CHANGE UP (ref 80–100)
MONOCYTES # BLD AUTO: 0.54 K/UL — SIGNIFICANT CHANGE UP (ref 0–0.9)
MONOCYTES NFR BLD AUTO: 7.9 % — SIGNIFICANT CHANGE UP (ref 2–14)
MRSA PCR RESULT.: SIGNIFICANT CHANGE UP
NEUTROPHILS # BLD AUTO: 3.55 K/UL — SIGNIFICANT CHANGE UP (ref 1.8–7.4)
NEUTROPHILS NFR BLD AUTO: 52 % — SIGNIFICANT CHANGE UP (ref 43–77)
PLATELET # BLD AUTO: 182 K/UL — SIGNIFICANT CHANGE UP (ref 150–400)
POTASSIUM SERPL-MCNC: 4.9 MMOL/L — SIGNIFICANT CHANGE UP (ref 3.5–5.3)
POTASSIUM SERPL-SCNC: 4.9 MMOL/L — SIGNIFICANT CHANGE UP (ref 3.5–5.3)
PROT SERPL-MCNC: 7.4 G/DL — SIGNIFICANT CHANGE UP (ref 6.6–8.7)
PROTHROM AB SERPL-ACNC: 11.2 SEC — SIGNIFICANT CHANGE UP (ref 10.5–13.4)
RBC # BLD: 5.45 M/UL — SIGNIFICANT CHANGE UP (ref 4.2–5.8)
RBC # FLD: 14.6 % — HIGH (ref 10.3–14.5)
S AUREUS DNA NOSE QL NAA+PROBE: SIGNIFICANT CHANGE UP
SODIUM SERPL-SCNC: 140 MMOL/L — SIGNIFICANT CHANGE UP (ref 135–145)
WBC # BLD: 6.84 K/UL — SIGNIFICANT CHANGE UP (ref 3.8–10.5)
WBC # FLD AUTO: 6.84 K/UL — SIGNIFICANT CHANGE UP (ref 3.8–10.5)

## 2022-04-25 PROCEDURE — G0463: CPT

## 2022-04-25 PROCEDURE — 93005 ELECTROCARDIOGRAM TRACING: CPT

## 2022-04-25 PROCEDURE — 93010 ELECTROCARDIOGRAM REPORT: CPT

## 2022-04-25 RX ORDER — CANAGLIFLOZIN 100 MG/1
1 TABLET, FILM COATED ORAL
Qty: 0 | Refills: 0 | DISCHARGE

## 2022-04-25 RX ORDER — LIRAGLUTIDE 6 MG/ML
0 INJECTION SUBCUTANEOUS
Qty: 0 | Refills: 0 | DISCHARGE

## 2022-04-25 RX ORDER — NATALIZUMAB 300 MG/15ML
0 INJECTION INTRAVENOUS
Qty: 0 | Refills: 0 | DISCHARGE

## 2022-04-25 RX ORDER — BUPROPION HYDROCHLORIDE 150 MG/1
1 TABLET, EXTENDED RELEASE ORAL
Qty: 0 | Refills: 0 | DISCHARGE

## 2022-04-25 RX ORDER — OLMESARTAN MEDOXOMIL 5 MG/1
1 TABLET, FILM COATED ORAL
Qty: 0 | Refills: 0 | DISCHARGE

## 2022-04-25 NOTE — H&P PST ADULT - NSICDXPASTMEDICALHX_GEN_ALL_CORE_FT
PAST MEDICAL HISTORY:  Ankle deformity, right     Bladder spasms     BMI 39.0-39.9,adult     Depression     Dyslipidemia     History of squamous cell carcinoma excision right cheek, 2015    Hypertension     Multiple sclerosis     Neuropathy bilateral hands    Obesity (BMI 30-39.9)     Sleep apnea uses CPAP    Type 2 diabetes mellitus     Varicose veins BLE

## 2022-04-25 NOTE — H&P PST ADULT - NSICDXFAMILYHX_GEN_ALL_CORE_FT
FAMILY HISTORY:  Family history of stomach cancer    Mother  Still living? No  Family history of uterine cancer, Age at diagnosis: Age Unknown    Grandparent  Still living? No  Family history of uterine cancer, Age at diagnosis: Age Unknown

## 2022-04-25 NOTE — H&P PST ADULT - HISTORY OF PRESENT ILLNESS
55 y/o male with PMH of presents with complaints or left shoulder pain. States he initially injured the shoulder in 1991 while scuba diving. States he has had pain ever since and it has been getting progressively worse. States he occasionally will reach to switch on the light switch and the shoulder will freeze. Reports the pain to be intermittent and 3/10 at rest. States the should freezing when he is trying to use it, is causing interference with ADLs. Denies fevers, chills, nausea or vomiting. States she does have associated numbness/tingling in the right arm. Patient is scheduled for left anatomic shoulder arthroplasty using an inlay glenoid versus reverse total shoulder arthroplasty on 5/13/2022 with Dr. Tavares Pendleton.

## 2022-04-25 NOTE — H&P PST ADULT - PROBLEM SELECTOR PLAN 1
Medical clearance pending  Patient is scheduled for left anatomic shoulder arthroplasty using an inlay glenoid versus reverse total shoulder arthroplasty on 5/13/2022 with Dr. Tavares Pendleton.

## 2022-04-25 NOTE — H&P PST ADULT - MALLAMPATI CLASS
ID Progress Note 2021 Subjective: No complaints other than mild sob Review of Systems: 
         
Symptom Y/N Comments   Symptom Y/N Comments Fever/Chills n      Chest Pain  n     
Poor Appetite       Edema  y Cough       Abdominal Pain Sputum       Joint Pain SOB/HAIRSTON y      Pruritis/Rash Nausea/vomit  n     Tolerating PT/OT Diarrhea  n     Tolerating Diet Constipation  n     Other Could NOT obtain due to:    
 
Objective:  
 
Vitals:  
Visit Vitals BP (!) 80/0 Pulse 87 Temp 98.3 °F (36.8 °C) Resp 26 Ht 5' 7\" (1.702 m) Wt 118.4 kg (261 lb 0.4 oz) SpO2 96% BMI 40.88 kg/m² Tmax:  Temp (24hrs), Av.2 °F (36.8 °C), Min:97.9 °F (36.6 °C), Max:98.4 °F (36.9 °C) PHYSICAL EXAM: 
General: morbidly obese, Chronically ill appearing, Alert, cooperative, no acute distress EENT:  EOMI. Anicteric sclerae. MMM Resp:  Clear in apex with decreased breath sounds at bases, no wheezing or rales. No accessory muscle use CV:  Regular  rhythm,  trace of pitting edema, LVAD hum GI:  Soft, Non distended, Non tender. +Bowel sounds Neurologic:  Alert and oriented X 3, normal speech, Psych:   Good insight. Not anxious nor agitated Skin:  No rashes. No jaundice, Venous stasis Labs:  
Lab Results Component Value Date/Time WBC 3.3 (L) 2021 10:17 AM  
 HGB 8.0 (L) 2021 10:17 AM  
 HCT 27.2 (L) 2021 10:17 AM  
 PLATELET 929 (L)  10:17 AM  
 MCV 88.3 2021 10:17 AM  
 
Lab Results Component Value Date/Time  Sodium 134 (L) 2021 01:58 PM  
 Potassium 4.0 2021 01:58 PM  
 Chloride 97 2021 01:58 PM  
 CO2 34 (H) 2021 01:58 PM  
 Anion gap 3 (L) 2021 01:58 PM  
 Glucose 135 (H) 2021 01:58 PM  
 BUN 44 (H) 2021 01:58 PM  
 Creatinine 2.94 (H) 2021 01:58 PM  
 BUN/Creatinine ratio 15 2021 01:58 PM  
 GFR est AA 19 (L) 2021 01:58 PM  
 GFR est non-AA 16 (L) 04/21/2021 01:58 PM  
 Calcium 9.2 04/21/2021 01:58 PM  
 Bilirubin, total 0.6 04/21/2021 01:17 AM  
 Alk. phosphatase 54 04/21/2021 01:17 AM  
 Protein, total 7.8 04/21/2021 01:17 AM  
 Albumin 3.9 04/21/2021 01:58 PM  
 Globulin 4.4 (H) 04/21/2021 01:17 AM  
 A-G Ratio 0.8 (L) 04/21/2021 01:17 AM  
 ALT (SGPT) 7 (L) 04/21/2021 01:17 AM  
 
 
 
Assessment and Plan Elevated sed rate - trending down 59 (4/18) from 71 on admission Afebrile Wbc 3.2; scheduled to check CBC with diff every other day while she is hospitalized Continue with keflex for now;  
   -> keflex was started as suppressive therapy for chronic sternal wound infection which has been completely healed. No active source of infection at this time. It is OK to stop the ABX if hematologist contribute leukopneia is secondary to beta lactam. 
      Will continue to follow along during this hospitalization. 
  
 Fever work up if temp >= 100.4 
  
Pancytopenia 
- hematologist has been consulted  
  
Acute on chronic systolic heart failure - cardiology following ROCIO on CKD 
- nephrology following Oly Nieves NP 
 
 
 
 
 
 Class II - visualization of the soft palate, fauces, and uvula

## 2022-04-25 NOTE — H&P PST ADULT - PROBLEM SELECTOR PLAN 4
monitor BP  EKG completed at [facility]  Patient to follow PCP advisement on when to hold aspirin   Advised patient to take amlodipine the morning of the procedure with small sip of water

## 2022-04-25 NOTE — H&P PST ADULT - RS GEN PE MLT RESP DETAILS PC
normal/airway patent/breath sounds equal/clear to auscultation bilaterally/no rales/no rhonchi/no wheezes

## 2022-04-25 NOTE — H&P PST ADULT - ASSESSMENT
Patient educated on surgical scrub, COVID testing scheduled 5/10/2022, preadmission instructions, medical clearance and day of procedure medications, verbalizes understanding. Pt instructed to stop vitamins/supplements/herbal medications/ASA/NSAIDS for one week prior to surgery and discuss with PMD.   57 y/o male with PMH of presents with complaints or left shoulder pain. States he initially injured the shoulder in 1991 while scuba diving. States he has had pain ever since and it has been getting progressively worse. States he occasionally will reach to switch on the light switch and the shoulder will freeze. Reports the pain to be intermittent and 3/10 at rest. States the should freezing when he is trying to use it, is causing interference with ADLs. Denies fevers, chills, nausea or vomiting. States she does have associated numbness/tingling in the right arm. Patient is scheduled for left anatomic shoulder arthroplasty using an inlay glenoid versus reverse total shoulder arthroplasty on 5/13/2022 with Dr. Tavares Pendleton.     Patient educated on surgical scrub, COVID testing scheduled 5/10/2022, preadmission instructions, medical clearance and day of procedure medications, verbalizes understanding. Pt instructed to stop vitamins/supplements/herbal medications/ASA/NSAIDS for one week prior to surgery and discuss with PMD.   57 y/o male with PMH of presents with complaints or left shoulder pain. States he initially injured the shoulder in  while scuba diving. States he has had pain ever since and it has been getting progressively worse. States he occasionally will reach to switch on the light switch and the shoulder will freeze. Reports the pain to be intermittent and 3/10 at rest. States the should freezing when he is trying to use it, is causing interference with ADLs. Denies fevers, chills, nausea or vomiting. States she does have associated numbness/tingling in the right arm. Patient is scheduled for left anatomic shoulder arthroplasty using an inlay glenoid versus reverse total shoulder arthroplasty on 2022 with Dr. Tavares Pendleton. Patient educated on surgical scrub, COVID testing scheduled 5/10/2022, preadmission instructions, medical clearance and day of procedure medications, verbalizes understanding. Pt instructed to stop vitamins/supplements/herbal medications/NSAIDS for one week prior to surgery and discuss with PMD. Patient to follow PCP advisement on aspirin .    CAPRINI SCORE    AGE RELATED RISK FACTORS                                                             [x ] Age 41-60 years                                            (1 Point)  [ ] Age: 61-74 years                                           (2 Points)                 [ ] Age= 75 years                                                (3 Points)             DISEASE RELATED RISK FACTORS                                                       [ ] Edema in the lower extremities                 (1 Point)                     [ ] Varicose veins                                               (1 Point)                                 [x ] BMI > 25 Kg/m2                                            (1 Point)                                  [ ] Serious infection (ie PNA)                            (1 Point)                     [ ] Lung disease ( COPD, Emphysema)            (1 Point)                                                                          [ ] Acute myocardial infarction                         (1 Point)                  [ ] Congestive heart failure (in the previous month)  (1 Point)         [ ] Inflammatory bowel disease                            (1 Point)                  [ ] Central venous access, PICC or Port               (2 points)       (within the last month)                                                                [ ] Stroke (in the previous month)                        (5 Points)    [ ] Previous or present malignancy                       (2 points)                                                                                                                                                         HEMATOLOGY RELATED FACTORS                                                         [ ] Prior episodes of VTE                                     (3 Points)                     [ ] Positive family history for VTE                      (3 Points)                  [ ] Prothrombin 82936 A                                     (3 Points)                     [ ] Factor V Leiden                                                (3 Points)                        [ ] Lupus anticoagulants                                      (3 Points)                                                           [ ] Anticardiolipin antibodies                              (3 Points)                                                       [ ] High homocysteine in the blood                   (3 Points)                                             [ ] Other congenital or acquired thrombophilia      (3 Points)                                                [ ] Heparin induced thrombocytopenia                  (3 Points)                                        MOBILITY RELATED FACTORS  [ ] Bed rest                                                         (1 Point)  [ ] Plaster cast                                                    (2 points)  [ ] Bed bound for more than 72 hours           (2 Points)    GENDER SPECIFIC FACTORS  [ ] Pregnancy or had a baby within the last month   (1 Point)  [ ] Post-partum < 6 weeks                                   (1 Point)  [ ] Hormonal therapy  or oral contraception   (1 Point)  [ ] History of pregnancy complications              (1 point)  [ ] Unexplained or recurrent              (1 Point)    OTHER RISK FACTORS                                           (1 Point)  [ ] BMI >40, smoking, diabetes requiring insulin, chemotherapy  blood transfusions and length of surgery over 2 hours    SURGERY RELATED RISK FACTORS  [ ]  Section within the last month     (1 Point)  [ ] Minor surgery                                                  (1 Point)  [ ] Arthroscopic surgery                                       (2 Points)  [ x] Planned major surgery lasting more            (2 Points)      than 45 minutes     [ ] Elective hip or knee joint replacement       (5 points)       surgery                                                TRAUMA RELATED RISK FACTORS  [ ] Fracture of the hip, pelvis, or leg                       (5 Points)  [ ] Spinal cord injury resulting in paralysis             (5 points)       (in the previous month)    [ ] Paralysis  (less than 1 month)                             (5 Points)  [ ] Multiple Trauma within 1 month                        (5 Points)    Total Score [   4     ]    Caprini Score 0-2: Low Risk, NO VTE prophylaxis required for most patients, encourage ambulation  Caprini Score 3-6: Moderate Risk , pharmacologic VTE prophylaxis is indicated for most patients (in the absence of contraindications)  Caprini Score Greater than or =7: High risk, pharmocologic VTE prophylaxis indicated for most patients (in the absence of contraindications)    OPIOID RISK TOOL    GALINA EACH BOX THAT APPLIES AND ADD TOTALS AT THE END    FAMILY HISTORY OF SUBSTANCE ABUSE                 FEMALE         MALE                                                Alcohol                             [  ]1 pt          [  ]3pts                                               Illegal Durgs                     [  ]2 pts        [  ]3pts                                               Rx Drugs                           [  ]4 pts        [  ]4 pts    PERSONAL HISTORY OF SUBSTANCE ABUSE                                                                                          Alcohol                             [  ]3 pts       [  ]3 pts                                               Illegal Drugs                     [  ]4 pts        [  ]4 pts                                               Rx Drugs                           [  ]5 pts        [  ]5 pts    AGE BETWEEN 16-45 YEARS                                      [  ]1 pt         [  ]1 pt    HISTORY OF PREADOLESCENT   SEXUAL ABUSE                                                             [  ]3 pts        [  ]0pts    PSYCHOLOGICAL DISEASE                     ADD, OCD, Bipolar, Schizophrenia        [  ]2 pts         [  ]2 pts                      Depression                                               [  ]1 pt           [ x ]1 pt           SCORING TOTAL   (add numbers and type here)              (**1*)                                     A score of 3 or lower indicated LOW risk for future opioid abuse  A score of 4 to 7 indicated moderate risk for future opioid abuse  A score of 8 or higher indicates a high risk for opioid abuse

## 2022-04-27 ENCOUNTER — APPOINTMENT (OUTPATIENT)
Dept: ENDOCRINOLOGY | Facility: CLINIC | Age: 57
End: 2022-04-27
Payer: COMMERCIAL

## 2022-04-27 ENCOUNTER — APPOINTMENT (OUTPATIENT)
Dept: INTERNAL MEDICINE | Facility: CLINIC | Age: 57
End: 2022-04-27
Payer: COMMERCIAL

## 2022-04-27 VITALS — DIASTOLIC BLOOD PRESSURE: 76 MMHG | SYSTOLIC BLOOD PRESSURE: 128 MMHG

## 2022-04-27 VITALS
WEIGHT: 236 LBS | BODY MASS INDEX: 39.32 KG/M2 | TEMPERATURE: 97.9 F | HEART RATE: 72 BPM | HEIGHT: 65 IN | OXYGEN SATURATION: 98 %

## 2022-04-27 DIAGNOSIS — Z01.818 ENCOUNTER FOR OTHER PREPROCEDURAL EXAMINATION: ICD-10-CM

## 2022-04-27 PROBLEM — G47.30 SLEEP APNEA, UNSPECIFIED: Chronic | Status: ACTIVE | Noted: 2018-04-17

## 2022-04-27 PROCEDURE — 95251 CONT GLUC MNTR ANALYSIS I&R: CPT

## 2022-04-27 PROCEDURE — 99214 OFFICE O/P EST MOD 30 MIN: CPT

## 2022-04-27 PROCEDURE — 99214 OFFICE O/P EST MOD 30 MIN: CPT | Mod: 25

## 2022-04-27 RX ORDER — DIMETHYL FUMARATE 240 MG/1
240 CAPSULE ORAL
Qty: 60 | Refills: 0 | Status: DISCONTINUED | COMMUNITY
Start: 2020-07-02 | End: 2022-04-27

## 2022-04-27 NOTE — PHYSICAL EXAM
[Alert] : alert [Healthy Appearance] : healthy appearance [No Acute Distress] : no acute distress [EOMI] : extra ocular movement intact [Normal Hearing] : hearing was normal [Supple] : the neck was supple [Thyroid Not Enlarged] : the thyroid was not enlarged [No Respiratory Distress] : no respiratory distress [No Accessory Muscle Use] : no accessory muscle use [Normal Rate and Effort] : normal respiratory rate and effort [Clear to Auscultation] : lungs were clear to auscultation bilaterally [Normal S1, S2] : normal S1 and S2 [Normal Rate] : heart rate was normal [Regular Rhythm] : with a regular rhythm [Normal Bowel Sounds] : normal bowel sounds [Not Tender] : non-tender [Not Distended] : not distended [Soft] : abdomen soft [No Clubbing, Cyanosis] : no clubbing  or cyanosis of the fingernails [No Involuntary Movements] : no involuntary movements were seen [Right foot was examined, including] : right foot ~C was examined, including visual inspection with sensory and pulse exams [Left foot was examined, including] : left foot ~C was examined, including visual inspection with sensory and pulse exams [Normal] : normal [2+] : 2+ in the dorsalis pedis [Oriented x3] : oriented to person, place, and time [Normal Affect] : the affect was normal [Normal Mood] : the mood was normal [Kyphosis] : no kyphosis present [Acanthosis Nigricans] : no acanthosis nigricans [Acne] : no acne [Diminished Throughout Both Feet] : normal tactile sensation with monofilament testing throughout both feet [de-identified] : +trace LE edema b/l [FreeTextEntry6] : healed ulcer on right top 1st toe

## 2022-04-27 NOTE — HISTORY OF PRESENT ILLNESS
[FreeTextEntry1] : 55 y/o M w/ hx of Type 2 DM diagnosed in 2000 around the time of getting steroids for MS. Prior to 2000 was diagnosed with prediabetes. Started on insulin with steroids. Had been getting infusions every 6 months has been on hold for COVID. Infusions are premedicated with steroid so takes sliding scale of Humalog with meals and bedtime which he takes until his blood glucose comes down to normal. Last infusion 11/2021. Otherwise on metformin 1000 mg BID, Jardiance 25 mg daily, and Saxenda (switched from Victoza 1.8 mg daily for additional weight loss benefits 9/2021). Has been more adherent to low carb diet. Checks glucose >4x/day with Freestyle Bryan with average glucose 140. Reported hypoglycemia with symptoms of hypoglycemia when Saxenda was increased to 3mg, now decreased to 1.8mg daily. +polyuria with neurogenic bladder on Mybetriq, no polydipsia unless eats too much glucose. No nausea or vomiting. Has noticed some increased indigestion on Saxenda. No change in weight. \par Follows with podiatry  Dr. Hearn for routine diabetes foot care. No hx of neuropathy in feet. \par No hx of nephropathy\par No hx of retinopathy. Last retinal exam with Dr. Anne 10/2021. \par \par HTN on valsartan and amlodipine\par \par HLD on statin\par

## 2022-04-27 NOTE — ASSESSMENT
[FreeTextEntry1] : 57 y/o M w/\par \par 1. Uncontrolled Type 2 DM w/ hyperglycemia- Discussed the importance of improved glycemic control to prevent long term complications from diabetes. Goal A1c <7% Had been at goal on current regimen when better adherent to diet. His regimen is optimal in terms of medications with low risk of hypoglycemia and additional weight loss, CV, and renal benefits. Would recommend optimize dose of Saxenda to goal of 3mg for additional weight loss and glycemic control benefits if possible without hypoglycemia. Monitor glucose via Freestyle Bryan. Optho follow-up up to date. 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 Check micro/cr.\par \par 2. HTN- monitor BP on amlodipine and valsartan. BP above goal today, if persistently elevated can increase amlodipine to 10mg daily.\par \par 3. HLD- c/w statin\par

## 2022-04-27 NOTE — HISTORY OF PRESENT ILLNESS
[No Pertinent Cardiac History] : no history of aortic stenosis, atrial fibrillation, coronary artery disease, recent myocardial infarction, or implantable device/pacemaker [Sleep Apnea] : sleep apnea [No Adverse Anesthesia Reaction] : no adverse anesthesia reaction in self or family member [(Patient denies any chest pain, claudication, dyspnea on exertion, orthopnea, palpitations or syncope)] : Patient denies any chest pain, claudication, dyspnea on exertion, orthopnea, palpitations or syncope [Good (7-10 METs)] : Good (7-10 METs) [Chronic Anticoagulation] : no chronic anticoagulation [Chronic Kidney Disease] : no chronic kidney disease [Diabetes] : no diabetes [FreeTextEntry1] : Shoulder replacement [FreeTextEntry2] : 5/13/22 [FreeTextEntry3] : Helder

## 2022-04-27 NOTE — DATA REVIEWED
[FreeTextEntry1] : Bryan 4/27/2022:  Reviewed and interpreted with values mostly at goal with occasional hyperglycemia occasionally after a meal. No hypoglycemia. At goal 80% of the time. Hyperglycemic 20%. No hypoglycemia. \par \par Labs 4/25//2022:\par A1c 6.8%\par CBC normal\par CMP normal except glucose of 133\par \par Bryan 1/19/2022:  Reviewed and interpreted with values mostly at goal with occasional hyperglycemia occasionally after a meal. No hypoglycemia. At goal 84% of the time. Hyperglycemic 16%. No hypoglycemia. \par \par Labs 1/19/2022:\par A1c 7.3%\par \par Bryan 9/15/2021:  Reviewed and interpreted with values mostly at goal with occasional hyperglycemia occasionally after a meal. No hypoglycemia. At goal 85% of the time. Hyperglycemic 15%. No hypoglycemia. \par \par Labs 9/15/2021:\par A1c 6.7%\par \par Bryan 6/15/2021:  Reviewed and interpreted with values mostly at goal with occasional hyperglycemia occasionally after a meal. No hypoglycemia. At goal 93% of the time. Hyperglycemic 7%\par \par Labs 6/15/2021:\par A1c 6.7%\par \par Bryan 5/3/2021: Reviewed and interpreted with values mostly at goal with occasional hyperglycemia usually early morning or after a meal.\par \par Labs 5/2021:\par Micro/Cr 28\par \par Labs 2/9/2021:\par A1c 7.9%\par CMP normal except glucose of 152\par Vit D 35.5

## 2022-04-27 NOTE — ASSESSMENT
[Patient Optimized for Surgery] : Patient optimized for surgery [Modify medications prior to procedure] : Modify medications prior to procedure [As per surgery] : as per surgery [FreeTextEntry5] : hold asa 1wk prior to surgery [FreeTextEntry7] : Hold diabetes meds as directed

## 2022-05-12 ENCOUNTER — NON-APPOINTMENT (OUTPATIENT)
Age: 57
End: 2022-05-12

## 2022-05-12 ENCOUNTER — APPOINTMENT (OUTPATIENT)
Dept: PULMONOLOGY | Facility: CLINIC | Age: 57
End: 2022-05-12
Payer: COMMERCIAL

## 2022-05-12 ENCOUNTER — TRANSCRIPTION ENCOUNTER (OUTPATIENT)
Age: 57
End: 2022-05-12

## 2022-05-12 VITALS
DIASTOLIC BLOOD PRESSURE: 78 MMHG | HEART RATE: 79 BPM | RESPIRATION RATE: 16 BRPM | HEIGHT: 66 IN | BODY MASS INDEX: 37.77 KG/M2 | TEMPERATURE: 97.4 F | SYSTOLIC BLOOD PRESSURE: 130 MMHG | WEIGHT: 235 LBS | OXYGEN SATURATION: 98 %

## 2022-05-12 DIAGNOSIS — Z01.811 ENCOUNTER FOR PREPROCEDURAL RESPIRATORY EXAMINATION: ICD-10-CM

## 2022-05-12 DIAGNOSIS — Z71.89 OTHER SPECIFIED COUNSELING: ICD-10-CM

## 2022-05-12 PROCEDURE — 94010 BREATHING CAPACITY TEST: CPT

## 2022-05-12 PROCEDURE — 99214 OFFICE O/P EST MOD 30 MIN: CPT | Mod: 25

## 2022-05-12 PROCEDURE — 95012 NITRIC OXIDE EXP GAS DETER: CPT

## 2022-05-12 NOTE — PROCEDURE
[FreeTextEntry1] : PFT revealed normal flows, with a FEV1 of 3.74L,which is 118% of predicted with a normal flow volume loop

## 2022-05-12 NOTE — HISTORY OF PRESENT ILLNESS
[FreeTextEntry1] : Mr. OLIVER is a 56 year old male with a history of obesity, post nasal drip, and SOB, presenting to the office today for a follow up pulmonary evaluation. His chief complaint is\par \par -he notes awaiting surgery for his shoulder joints\par -he notes poor quality of sleep due to shoulder \par -he notes use of CPAP machine \par -he notes intermittent chest pain\par -he notes slowly losing weigh due to diet \par -he notes diet is very controlled\par -he denies dysphonia\par -he notes mild allergy symptoms \par -he notes coughing due to allergies\par -he denies SOB and breathing issues \par \par -denies any visual issues, headaches, nausea, vomiting, fever, chills, sweats, chest pressure, diarrhea, constipation, dysphagia, dizziness, leg swelling, leg pain, itchy eyes, itchy ears, heartburn, reflux, or sour taste in the mouth.

## 2022-05-12 NOTE — ADDENDUM
[FreeTextEntry1] : Documented by Isidro Williamson acting as a scribe for Dr. Pancho Byrne on 05/12/2022 \par \par All medical record entries made by the Scribe were at my, Dr. Pancho Byrne's, direction and personally dictated by me on 05/12/2022 . I have reviewed the chart and agree that the record accurately reflects my personal performance of the history, physical exam, assessment and plan. I have also personally directed, reviewed, and agree with the discharge instructions \par

## 2022-05-12 NOTE — ASSESSMENT
[FreeTextEntry1] : Mr. OLIVER is a 56 year old male with a history of HLD, RADS, HTN, DM, MS, JERRELL, and allergies who comes into the office today for a follow up pulmonary evaluation - His number one issue is still weight. (improving) - stable \par \par *************************************************Pre Op*******************************************************************************\par -at this point in time there are no absolute pulmonary contraindications to go forward with the planned procedure \par -at the time of surgery s/he should have optimal pain control, incentive spirometry, early ambulation, DVT and GI prophylaxis. \par \par The patient's shortness of breath is multifactorial due to:\par -pulmonary disease \par      -has risk factors for asthma (allergy)\par -poor breathing mechanics \par -overweight/out of shape\par -CAD \par \par Problem 1A:Pre Op \par -at this point in time there are no absolute pulmonary contraindications to go forward with the planned procedure \par -at the time of surgery s/he should have optimal pain control, incentive spirometry, early ambulation, DVT and GI prophylaxis. \par \par Problem 1: Allergy / Sinus - active\par -Continue using Zyrtec 5 mg QHS \par -Environmental measures for allergies were encouraged including mattress and pillow cover, air purifier, and environmental controls. \par \par Problem 2: JERRELL (compliant) \par -s/p split night sleep study for memory concentration, nocturia, daily fatigue, metabolism.\par -Resmed Fx wide full nasal mask (medium), with 8 cm pressure (community)\par -Sleep apnea is associated with adverse clinical consequences which an affect most organ systems. Cardiovascular disease risk includes arrhythmias, atrial fibrillation, hypertension, coronary artery disease, and stroke. Metabolic disorders include diabetes type 2, non-alcoholic fatty liver disease. Mood disorder especially depression; and cognitive decline especially in the elderly. Associations with chronic reflux/Morris’s esophagus some but not all inclusive. \par -Reasons include arousal consistent with hypopnea; respiratory events most prominent in REM sleep or supine position; therefore sleep staging and body position are important for accurate diagnosis and estimation of AHI. \par \par Problem 3: Cardiac\par -Recommended to follow up with cardiologist if needed\par \par Problem 4: Poor Mechanics of Breathing\par -Recommended Wim Hof and Buteyko breathing techniques \par - Proper breathing techniques were reviewed with an emphasis of exhalation. Patient instructed to breath in for 1 second and out for four seconds. Patient was encouraged to not talk while walking. \par \par Problem 5: overweight/out of shape\par -Recommend "Muniq" OTC Supplement for weight loss, energy levels, and blood sugar levels \par - Weight loss, exercise and diet control were discussed and are highly encouraged. Treatment options were given such as aqua therapy, and contacting a nutritionist. Recommended to use the elliptical, stationary bike, less use of treadmill. Mindful eating was explained to the patient. Obesity is associated with worsening asthma, SOB, and potential for cardiac disease, diabetes, and other underlying medical conditions.\par \par Problem 6: Health Maintenance/COVID19 Precautions:\par -s/p Pfizer COVID 19 vaccine x 3 (10/2021)\par - Clean your hands often. Wash your hands often with soap and water for at least 20 seconds, especially after blowing your nose, coughing, or sneezing, or having been in a public place.\par - If soap and water are not available, use a hand  that contains at least 60% alcohol.\par - To the extent possible, avoid touching high-touch surfaces in public places - elevator buttons, door handles, handrails, handshaking with people, etc. Use a tissue or your sleeve to cover your hand or finger if you must touch something.\par - Wash your hands after touching surfaces in public places.\par - Avoid touching your face, nose, eyes, etc.\par - Clean and disinfect your home to remove germs: practice routine cleaning of frequently touched surfaces (for example: tables, doorknobs, light switches, handles, desks, toilets, faucets, sinks & cell phones)\par - Avoid crowds, especially in poorly ventilated spaces. Your risk of exposure to respiratory viruses like COVID-19 may increase in crowded, closed-in settings with little air circulation if there are people in the crowd who are sick. All patients are recommended to practice social distancing and stay at least 6 feet away from others.\par - Avoid all non-essential travel including plane trips, and especially avoid embarking on cruise ships.\par -If COVID-19 is spreading in your community, take extra measures to put distance between yourself and other people to further reduce your risk of being exposed to this new virus.\par -Stay home as much as possible.\par - Consider ways of getting food brought to your house through family, social, or commercial networks\par -Be aware that the virus has been known to live in the air up to 3 hours post exposure, cardboard up to 24 hours post exposure, copper up to 4 hours post exposure, steel and plastic up to 2-3 days post exposure. Risk of transmission from these surfaces are affected by many variables.\par Immune Support Recommendations:\par -OTC Vitamin C 500mg BID \par -OTC Quercetin 250-500mg BID \par -OTC Zinc 75-100mg per day \par -OTC Melatonin 1 or 2 mg a night \par -OTC Vitamin D 1-4000mg per day \par -OTC Tonic Water 8oz per day\par Asthma and COVID19:\par You need to make sure your asthma is under control. This often requires the use of inhaled corticosteroids (and sometimes oral corticosteroids). Inhaled corticosteroids do not likely reduce your immune system’s ability to fight infections, but oral corticosteroids may. It is important to use the steps above to protect yourself to limit your exposure to any respiratory virus.\par \par Problem 7: health maintenance\par -s/p influenza vaccine - 2021\par -recommended strep pneumonia vaccines after age 65: Prevnar-13 vaccine, followed by Pneumo vaccine 23 one year following\par -recommended early intervention for URIs\par -recommended regular osteoporosis evaluations\par -recommended early dermatological evaluations\par -recommended after the age of 50 to the age of 70, colonoscopy every 5 years \par \par \par  Follow up in 4 months - SPI\par -he  is recommended to call with any changes, questions, or concerns.

## 2022-05-13 ENCOUNTER — APPOINTMENT (OUTPATIENT)
Dept: ORTHOPEDIC SURGERY | Facility: HOSPITAL | Age: 57
End: 2022-05-13

## 2022-05-13 ENCOUNTER — TRANSCRIPTION ENCOUNTER (OUTPATIENT)
Age: 57
End: 2022-05-13

## 2022-05-13 ENCOUNTER — INPATIENT (INPATIENT)
Facility: HOSPITAL | Age: 57
LOS: 0 days | Discharge: HOME | DRG: 483 | End: 2022-05-14
Attending: STUDENT IN AN ORGANIZED HEALTH CARE EDUCATION/TRAINING PROGRAM | Admitting: STUDENT IN AN ORGANIZED HEALTH CARE EDUCATION/TRAINING PROGRAM
Payer: COMMERCIAL

## 2022-05-13 ENCOUNTER — RESULT REVIEW (OUTPATIENT)
Age: 57
End: 2022-05-13

## 2022-05-13 VITALS
SYSTOLIC BLOOD PRESSURE: 140 MMHG | OXYGEN SATURATION: 96 % | HEART RATE: 80 BPM | WEIGHT: 235.89 LBS | HEIGHT: 66 IN | DIASTOLIC BLOOD PRESSURE: 70 MMHG | RESPIRATION RATE: 15 BRPM | TEMPERATURE: 98 F

## 2022-05-13 DIAGNOSIS — M19.019 PRIMARY OSTEOARTHRITIS, UNSPECIFIED SHOULDER: ICD-10-CM

## 2022-05-13 DIAGNOSIS — Z98.890 OTHER SPECIFIED POSTPROCEDURAL STATES: Chronic | ICD-10-CM

## 2022-05-13 DIAGNOSIS — M25.512 PAIN IN LEFT SHOULDER: ICD-10-CM

## 2022-05-13 LAB
ABO RH CONFIRMATION: SIGNIFICANT CHANGE UP
GLUCOSE BLDC GLUCOMTR-MCNC: 129 MG/DL — HIGH (ref 70–99)
GLUCOSE BLDC GLUCOMTR-MCNC: 141 MG/DL — HIGH (ref 70–99)
GLUCOSE BLDC GLUCOMTR-MCNC: 167 MG/DL — HIGH (ref 70–99)

## 2022-05-13 PROCEDURE — 23472 RECONSTRUCT SHOULDER JOINT: CPT | Mod: LT

## 2022-05-13 PROCEDURE — 73030 X-RAY EXAM OF SHOULDER: CPT | Mod: 26,LT

## 2022-05-13 DEVICE — IMPLANTABLE DEVICE: Type: IMPLANTABLE DEVICE | Status: FUNCTIONAL

## 2022-05-13 DEVICE — CEMENT  PALACOS  LV PLUS G LOW VISCOSITY CEMENT W GENTAMICIN: Type: IMPLANTABLE DEVICE | Status: FUNCTIONAL

## 2022-05-13 DEVICE — CEMENT BONE PALACOS R W/O GENTAMICIN 1X40: Type: IMPLANTABLE DEVICE | Status: FUNCTIONAL

## 2022-05-13 DEVICE — BIOCOMPOSITE SWIVEL LOCK C DOUBLE LOADED 4.75MMX22MM: Type: IMPLANTABLE DEVICE | Status: FUNCTIONAL

## 2022-05-13 RX ORDER — CHOLECALCIFEROL (VITAMIN D3) 125 MCG
1 CAPSULE ORAL
Qty: 0 | Refills: 0 | DISCHARGE

## 2022-05-13 RX ORDER — SODIUM CHLORIDE 9 MG/ML
1000 INJECTION, SOLUTION INTRAVENOUS
Refills: 0 | Status: DISCONTINUED | OUTPATIENT
Start: 2022-05-13 | End: 2022-05-14

## 2022-05-13 RX ORDER — VALSARTAN 80 MG/1
160 TABLET ORAL DAILY
Refills: 0 | Status: DISCONTINUED | OUTPATIENT
Start: 2022-05-13 | End: 2022-05-14

## 2022-05-13 RX ORDER — AMLODIPINE BESYLATE 2.5 MG/1
5 TABLET ORAL DAILY
Refills: 0 | Status: DISCONTINUED | OUTPATIENT
Start: 2022-05-13 | End: 2022-05-14

## 2022-05-13 RX ORDER — BUPROPION HYDROCHLORIDE 150 MG/1
3 TABLET, EXTENDED RELEASE ORAL
Qty: 0 | Refills: 0 | DISCHARGE

## 2022-05-13 RX ORDER — TRANEXAMIC ACID 100 MG/ML
1000 INJECTION, SOLUTION INTRAVENOUS ONCE
Refills: 0 | Status: DISCONTINUED | OUTPATIENT
Start: 2022-05-13 | End: 2022-05-13

## 2022-05-13 RX ORDER — HYDROMORPHONE HYDROCHLORIDE 2 MG/ML
1 INJECTION INTRAMUSCULAR; INTRAVENOUS; SUBCUTANEOUS
Refills: 0 | Status: DISCONTINUED | OUTPATIENT
Start: 2022-05-13 | End: 2022-05-13

## 2022-05-13 RX ORDER — ASPIRIN/CALCIUM CARB/MAGNESIUM 324 MG
81 TABLET ORAL DAILY
Refills: 0 | Status: DISCONTINUED | OUTPATIENT
Start: 2022-05-13 | End: 2022-05-14

## 2022-05-13 RX ORDER — SILODOSIN 4 MG/1
1 CAPSULE ORAL
Qty: 0 | Refills: 0 | DISCHARGE

## 2022-05-13 RX ORDER — CEFAZOLIN SODIUM 1 G
2000 VIAL (EA) INJECTION ONCE
Refills: 0 | Status: DISCONTINUED | OUTPATIENT
Start: 2022-05-13 | End: 2022-05-13

## 2022-05-13 RX ORDER — ONDANSETRON 8 MG/1
4 TABLET, FILM COATED ORAL ONCE
Refills: 0 | Status: DISCONTINUED | OUTPATIENT
Start: 2022-05-13 | End: 2022-05-13

## 2022-05-13 RX ORDER — OXYCODONE HYDROCHLORIDE 5 MG/1
10 TABLET ORAL
Refills: 0 | Status: DISCONTINUED | OUTPATIENT
Start: 2022-05-13 | End: 2022-05-14

## 2022-05-13 RX ORDER — ATORVASTATIN CALCIUM 80 MG/1
1 TABLET, FILM COATED ORAL
Qty: 0 | Refills: 0 | DISCHARGE

## 2022-05-13 RX ORDER — ACETAMINOPHEN 500 MG
975 TABLET ORAL ONCE
Refills: 0 | Status: COMPLETED | OUTPATIENT
Start: 2022-05-13 | End: 2022-05-13

## 2022-05-13 RX ORDER — OCRELIZUMAB 300 MG/10ML
0 INJECTION INTRAVENOUS
Qty: 0 | Refills: 0 | DISCHARGE

## 2022-05-13 RX ORDER — VENLAFAXINE HCL 75 MG
1 CAPSULE, EXT RELEASE 24 HR ORAL
Qty: 0 | Refills: 0 | DISCHARGE

## 2022-05-13 RX ORDER — METFORMIN HYDROCHLORIDE 850 MG/1
1 TABLET ORAL
Qty: 0 | Refills: 0 | DISCHARGE

## 2022-05-13 RX ORDER — POLYETHYLENE GLYCOL 3350 17 G/17G
1 POWDER, FOR SOLUTION ORAL
Qty: 0 | Refills: 0 | DISCHARGE

## 2022-05-13 RX ORDER — SODIUM CHLORIDE 9 MG/ML
3 INJECTION INTRAMUSCULAR; INTRAVENOUS; SUBCUTANEOUS ONCE
Refills: 0 | Status: DISCONTINUED | OUTPATIENT
Start: 2022-05-13 | End: 2022-05-13

## 2022-05-13 RX ORDER — SENNA PLUS 8.6 MG/1
2 TABLET ORAL AT BEDTIME
Refills: 0 | Status: DISCONTINUED | OUTPATIENT
Start: 2022-05-13 | End: 2022-05-14

## 2022-05-13 RX ORDER — MIRABEGRON 50 MG/1
1 TABLET, EXTENDED RELEASE ORAL
Qty: 0 | Refills: 0 | DISCHARGE

## 2022-05-13 RX ORDER — ATORVASTATIN CALCIUM 80 MG/1
10 TABLET, FILM COATED ORAL DAILY
Refills: 0 | Status: DISCONTINUED | OUTPATIENT
Start: 2022-05-13 | End: 2022-05-14

## 2022-05-13 RX ORDER — CEFAZOLIN SODIUM 1 G
2000 VIAL (EA) INJECTION EVERY 8 HOURS
Refills: 0 | Status: DISCONTINUED | OUTPATIENT
Start: 2022-05-13 | End: 2022-05-14

## 2022-05-13 RX ORDER — AMLODIPINE BESYLATE 2.5 MG/1
1 TABLET ORAL
Qty: 0 | Refills: 0 | DISCHARGE

## 2022-05-13 RX ORDER — VALSARTAN 80 MG/1
1 TABLET ORAL
Qty: 0 | Refills: 0 | DISCHARGE

## 2022-05-13 RX ORDER — LIRAGLUTIDE 6 MG/ML
1 INJECTION SUBCUTANEOUS
Qty: 0 | Refills: 0 | DISCHARGE

## 2022-05-13 RX ORDER — EMPAGLIFLOZIN 10 MG/1
1 TABLET, FILM COATED ORAL
Qty: 0 | Refills: 0 | DISCHARGE

## 2022-05-13 RX ORDER — CELECOXIB 200 MG/1
400 CAPSULE ORAL ONCE
Refills: 0 | Status: COMPLETED | OUTPATIENT
Start: 2022-05-13 | End: 2022-05-13

## 2022-05-13 RX ORDER — CETIRIZINE HYDROCHLORIDE 10 MG/1
1 TABLET ORAL
Qty: 0 | Refills: 0 | DISCHARGE

## 2022-05-13 RX ORDER — KETAMINE HYDROCHLORIDE 100 MG/ML
0.5 INJECTION INTRAMUSCULAR; INTRAVENOUS
Qty: 200 | Refills: 0 | Status: DISCONTINUED | OUTPATIENT
Start: 2022-05-13 | End: 2022-05-13

## 2022-05-13 RX ORDER — METFORMIN HYDROCHLORIDE 850 MG/1
1000 TABLET ORAL
Refills: 0 | Status: DISCONTINUED | OUTPATIENT
Start: 2022-05-13 | End: 2022-05-14

## 2022-05-13 RX ORDER — OXYCODONE HYDROCHLORIDE 5 MG/1
5 TABLET ORAL
Refills: 0 | Status: DISCONTINUED | OUTPATIENT
Start: 2022-05-13 | End: 2022-05-14

## 2022-05-13 RX ADMIN — Medication 975 MILLIGRAM(S): at 06:47

## 2022-05-13 RX ADMIN — SENNA PLUS 2 TABLET(S): 8.6 TABLET ORAL at 21:55

## 2022-05-13 RX ADMIN — CELECOXIB 400 MILLIGRAM(S): 200 CAPSULE ORAL at 06:47

## 2022-05-13 RX ADMIN — METFORMIN HYDROCHLORIDE 1000 MILLIGRAM(S): 850 TABLET ORAL at 23:30

## 2022-05-13 RX ADMIN — HYDROMORPHONE HYDROCHLORIDE 1 MILLIGRAM(S): 2 INJECTION INTRAMUSCULAR; INTRAVENOUS; SUBCUTANEOUS at 14:15

## 2022-05-13 RX ADMIN — Medication 100 MILLIGRAM(S): at 21:57

## 2022-05-13 RX ADMIN — HYDROMORPHONE HYDROCHLORIDE 1 MILLIGRAM(S): 2 INJECTION INTRAMUSCULAR; INTRAVENOUS; SUBCUTANEOUS at 14:00

## 2022-05-13 RX ADMIN — OXYCODONE HYDROCHLORIDE 5 MILLIGRAM(S): 5 TABLET ORAL at 19:41

## 2022-05-13 NOTE — DISCHARGE NOTE PROVIDER - CARE PROVIDER_API CALL
Tavares Pendleton (DO)  Orthopedics  [address] [place] [id number]  Phone: [phone]  Fax: [phone]  Follow Up Time:

## 2022-05-13 NOTE — DISCHARGE NOTE PROVIDER - HOSPITAL COURSE
The patient underwent a LEFT REVERSE TOTAL SHOULDER on 5/13/22. The patient received antibiotics consistent with SCIP guidelines. The patient underwent the procedure and had no intra-operative complications. Post-operatively, the patient was seen by medicine and PT/OT. The patient received pain medications per orthopedic pain management pathway and the pain was appropriately controlled. The patient did not have any post-operative medical complications. The patient was discharged in stable condition.

## 2022-05-13 NOTE — DISCHARGE NOTE PROVIDER - NSDCFUADDINST_GEN_ALL_CORE_FT
The patient will be seen in the office between 2-3 weeks for wound check.   **Your first post-operative visit has been scheduled prior to your admission. PLEASE CONTACT OFFICE TO CONFIRM THE APPOINTMENT DATE. Tape will be removed at that time.  **  The silver based dressing is to be removed 7 days from the date of surgery.   ** CONTACT THE OFFICE IF THE FOLLOWING DEVELOP:  - the dressing becomes soiled or saturated  - you develop a fever greater that 101F  - the wound becomes red or you develop blistering around the wound  * Patient may shower after post-op day #3.  * The patient is NON weight bearing of the surgical arm.  * The patient will continue home dose ASPIRIN.  * The patient will take OXYCODONE AND TYLENOL for pain control and adjust according to prescription and patient needs. Contact the office if pain increases while taking prescribed pain medications or related concerns develop.  * The patient will take Senna S while taking oxycodone to prevent narcotic associated constipation.  Additionally, increase water intake (drink at least 8 glasses of water daily) and try adding fiber to the diet by eating fruits, vegetables and foods that are rich in grains. If constipation is experienced, contact the medical/primary care provider to discuss further treatment options.  - if you experience any dizziness or medical concerns, call your medical doctor or  911.  * The implant may activate metal detection devices.  The patient will be seen in the office between 2-3 weeks for wound check.   **Your first post-operative visit has been scheduled prior to your admission. PLEASE CONTACT OFFICE TO CONFIRM THE APPOINTMENT DATE. Tape will be removed at that time.  **  The silver based dressing is to be removed 7 days from the date of surgery.   ** CONTACT THE OFFICE IF THE FOLLOWING DEVELOP:  - the dressing becomes soiled or saturated  - you develop a fever greater that 101F  - the wound becomes red or you develop blistering around the wound  * Patient may shower after post-op day #3.  * The patient is NON weight bearing of the surgical arm, pendulums only.  * The patient will continue home dose ASPIRIN.  * The patient will take OXYCODONE AND TYLENOL for pain control and adjust according to prescription and patient needs. Contact the office if pain increases while taking prescribed pain medications or related concerns develop.  * The patient will take Senna S while taking oxycodone to prevent narcotic associated constipation.  Additionally, increase water intake (drink at least 8 glasses of water daily) and try adding fiber to the diet by eating fruits, vegetables and foods that are rich in grains. If constipation is experienced, contact the medical/primary care provider to discuss further treatment options.  - if you experience any dizziness or medical concerns, call your medical doctor or  911.  * The implant may activate metal detection devices.  The patient will be seen in the office between 2-3 weeks for wound check.   **Your first post-operative visit has been scheduled prior to your admission. PLEASE CONTACT OFFICE TO CONFIRM THE APPOINTMENT DATE. Tape will be removed at that time.  **  The silver based dressing is to be removed 7 days from the date of surgery.   ** CONTACT THE OFFICE IF THE FOLLOWING DEVELOP:  - the dressing becomes soiled or saturated  - you develop a fever greater that 101F  - the wound becomes red or you develop blistering around the wound  * Patient may shower after post-op day #3.  * The patient is NON weight bearing of the surgical arm, pendulums only.  * The patient will continue home dose ASPIRIN.  * The patient will take Nucynta (as per Dr. Pendleton) AND TYLENOL for pain control and adjust according to prescription and patient needs. Contact the office if pain increases while taking prescribed pain medications or related concerns develop.  * The patient will take Senna S while taking Nucynta to prevent narcotic associated constipation.  Additionally, increase water intake (drink at least 8 glasses of water daily) and try adding fiber to the diet by eating fruits, vegetables and foods that are rich in grains. If constipation is experienced, contact the medical/primary care provider to discuss further treatment options.  - if you experience any dizziness or medical concerns, call your medical doctor or  911.  * The implant may activate metal detection devices.

## 2022-05-13 NOTE — DISCHARGE NOTE PROVIDER - NSDCCPCAREPLAN_GEN_ALL_CORE_FT
PRINCIPAL DISCHARGE DIAGNOSIS  Diagnosis: OA (osteoarthritis) of shoulder  Assessment and Plan of Treatment:

## 2022-05-13 NOTE — DISCHARGE NOTE PROVIDER - NSDCFUSCHEDAPPT_GEN_ALL_CORE_FT
Tavares Pendleton  [facility]  ORTHOSURG [address]  Scheduled Appointment: 05/26/2022    Mani Arce  [facility]  Endocrin [address]  Scheduled Appointment: 08/10/2022

## 2022-05-13 NOTE — DISCHARGE NOTE PROVIDER - NSDCMRMEDTOKEN_GEN_ALL_CORE_FT
amLODIPine 5 mg oral tablet: 1 tab(s) orally once a day  aspirin 81 mg oral delayed release tablet: 1 tab(s) orally once a day  atorvastatin 10 mg oral tablet: 1 tab(s) orally once a day  cetirizine 10 mg oral tablet: 1 tab(s) orally once a day  Jardiance 25 mg oral tablet: 1 tab(s) orally once a day (in the morning)  metFORMIN 1000 mg oral tablet: 1 tab(s) orally 2 times a day  MiraLax oral powder for reconstitution: 1 packet(s) orally once a day  Myrbetriq 25 mg oral tablet, extended release: 1 tab(s) orally once a day  Ocrevus 300 mg/10 mL intravenous solution: intravenous every 6 months  Rapaflo 8 mg oral capsule: 1 cap(s) orally once a day  Saxenda 18 mg/3 mL subcutaneous solution: 1  subcutaneous once a day  valsartan 160 mg oral tablet: 1 tab(s) orally once a day  venlafaxine 225 mg oral tablet, extended release: 1 tab(s) orally once a day  Vitamin D3 2000 intl units oral capsule: 1 cap(s) orally once a day  Wellbutrin  mg/24 hours oral tablet, extended release: 3 tab(s) orally every 24 hours   amLODIPine 5 mg oral tablet: 1 tab(s) orally once a day  aspirin 81 mg oral delayed release tablet: 1 tab(s) orally once a day  atorvastatin 10 mg oral tablet: 1 tab(s) orally once a day  cetirizine 10 mg oral tablet: 1 tab(s) orally once a day  Jardiance 25 mg oral tablet: 1 tab(s) orally once a day (in the morning)  metFORMIN 1000 mg oral tablet: 1 tab(s) orally 2 times a day  MiraLax oral powder for reconstitution: 1 packet(s) orally once a day  Myrbetriq 25 mg oral tablet, extended release: 1 tab(s) orally once a day  Nucynta ER 50 mg oral tablet, extended release: 1 to 2 tab(s) orally every 6 hours, As Needed for moderate pain MDD:8 tabs     Med to bed  Ocrevus 300 mg/10 mL intravenous solution: intravenous every 6 months  Rapaflo 8 mg oral capsule: 1 cap(s) orally once a day  Saxenda 18 mg/3 mL subcutaneous solution: 1  subcutaneous once a day  Senna S 50 mg-8.6 mg oral tablet: 2 tab(s) orally once a day (at bedtime), As Needed -for constipation MDD:2     Med to bed  valsartan 160 mg oral tablet: 1 tab(s) orally once a day  venlafaxine 225 mg oral tablet, extended release: 1 tab(s) orally once a day  Vitamin D3 2000 intl units oral capsule: 1 cap(s) orally once a day  Wellbutrin  mg/24 hours oral tablet, extended release: 3 tab(s) orally every 24 hours   amLODIPine 5 mg oral tablet: 1 tab(s) orally once a day  aspirin 81 mg oral delayed release tablet: 1 tab(s) orally once a day  atorvastatin 10 mg oral tablet: 1 tab(s) orally once a day  cetirizine 10 mg oral tablet: 1 tab(s) orally once a day  Jardiance 25 mg oral tablet: 1 tab(s) orally once a day (in the morning)  metFORMIN 1000 mg oral tablet: 1 tab(s) orally 2 times a day  MiraLax oral powder for reconstitution: 1 packet(s) orally once a day  Myrbetriq 25 mg oral tablet, extended release: 1 tab(s) orally once a day  Nucynta 50 mg oral tablet: 1 to 2 tab(s) orally every 6 hours, As Needed for moderate to severe pain MDD:8     Ocrevus 300 mg/10 mL intravenous solution: intravenous every 6 months  Rapaflo 8 mg oral capsule: 1 cap(s) orally once a day  Saxenda 18 mg/3 mL subcutaneous solution: 1  subcutaneous once a day  Senna S 50 mg-8.6 mg oral tablet: 2 tab(s) orally once a day (at bedtime), As Needed -for constipation MDD:2     Med to bed  valsartan 160 mg oral tablet: 1 tab(s) orally once a day  venlafaxine 225 mg oral tablet, extended release: 1 tab(s) orally once a day  Vitamin D3 2000 intl units oral capsule: 1 cap(s) orally once a day  Wellbutrin  mg/24 hours oral tablet, extended release: 3 tab(s) orally every 24 hours

## 2022-05-14 ENCOUNTER — TRANSCRIPTION ENCOUNTER (OUTPATIENT)
Age: 57
End: 2022-05-14

## 2022-05-14 VITALS
SYSTOLIC BLOOD PRESSURE: 137 MMHG | OXYGEN SATURATION: 96 % | RESPIRATION RATE: 18 BRPM | HEART RATE: 74 BPM | DIASTOLIC BLOOD PRESSURE: 77 MMHG | TEMPERATURE: 99 F

## 2022-05-14 LAB
ANION GAP SERPL CALC-SCNC: 15 MMOL/L — SIGNIFICANT CHANGE UP (ref 5–17)
BUN SERPL-MCNC: 18.3 MG/DL — SIGNIFICANT CHANGE UP (ref 8–20)
CALCIUM SERPL-MCNC: 9 MG/DL — SIGNIFICANT CHANGE UP (ref 8.6–10.2)
CHLORIDE SERPL-SCNC: 101 MMOL/L — SIGNIFICANT CHANGE UP (ref 98–107)
CO2 SERPL-SCNC: 23 MMOL/L — SIGNIFICANT CHANGE UP (ref 22–29)
CREAT SERPL-MCNC: 0.75 MG/DL — SIGNIFICANT CHANGE UP (ref 0.5–1.3)
EGFR: 105 ML/MIN/1.73M2 — SIGNIFICANT CHANGE UP
GLUCOSE BLDC GLUCOMTR-MCNC: 191 MG/DL — HIGH (ref 70–99)
GLUCOSE SERPL-MCNC: 118 MG/DL — HIGH (ref 70–99)
HCT VFR BLD CALC: 41.2 % — SIGNIFICANT CHANGE UP (ref 39–50)
HGB BLD-MCNC: 12.9 G/DL — LOW (ref 13–17)
MCHC RBC-ENTMCNC: 27.7 PG — SIGNIFICANT CHANGE UP (ref 27–34)
MCHC RBC-ENTMCNC: 31.3 GM/DL — LOW (ref 32–36)
MCV RBC AUTO: 88.6 FL — SIGNIFICANT CHANGE UP (ref 80–100)
PLATELET # BLD AUTO: 180 K/UL — SIGNIFICANT CHANGE UP (ref 150–400)
POTASSIUM SERPL-MCNC: 3.9 MMOL/L — SIGNIFICANT CHANGE UP (ref 3.5–5.3)
POTASSIUM SERPL-SCNC: 3.9 MMOL/L — SIGNIFICANT CHANGE UP (ref 3.5–5.3)
RBC # BLD: 4.65 M/UL — SIGNIFICANT CHANGE UP (ref 4.2–5.8)
RBC # FLD: 15 % — HIGH (ref 10.3–14.5)
SODIUM SERPL-SCNC: 138 MMOL/L — SIGNIFICANT CHANGE UP (ref 135–145)
WBC # BLD: 9.95 K/UL — SIGNIFICANT CHANGE UP (ref 3.8–10.5)
WBC # FLD AUTO: 9.95 K/UL — SIGNIFICANT CHANGE UP (ref 3.8–10.5)

## 2022-05-14 PROCEDURE — C1713: CPT

## 2022-05-14 PROCEDURE — C9399: CPT

## 2022-05-14 PROCEDURE — 80048 BASIC METABOLIC PNL TOTAL CA: CPT

## 2022-05-14 PROCEDURE — 36415 COLL VENOUS BLD VENIPUNCTURE: CPT

## 2022-05-14 PROCEDURE — 73030 X-RAY EXAM OF SHOULDER: CPT

## 2022-05-14 PROCEDURE — 99222 1ST HOSP IP/OBS MODERATE 55: CPT

## 2022-05-14 PROCEDURE — C1776: CPT

## 2022-05-14 PROCEDURE — 85027 COMPLETE CBC AUTOMATED: CPT

## 2022-05-14 PROCEDURE — 82962 GLUCOSE BLOOD TEST: CPT

## 2022-05-14 RX ORDER — INSULIN LISPRO 100/ML
VIAL (ML) SUBCUTANEOUS
Refills: 0 | Status: DISCONTINUED | OUTPATIENT
Start: 2022-05-14 | End: 2022-05-14

## 2022-05-14 RX ORDER — DEXTROSE 50 % IN WATER 50 %
25 SYRINGE (ML) INTRAVENOUS ONCE
Refills: 0 | Status: DISCONTINUED | OUTPATIENT
Start: 2022-05-14 | End: 2022-05-14

## 2022-05-14 RX ORDER — TAPENTADOL HYDROCHLORIDE 50 MG/1
1 TABLET, FILM COATED ORAL
Qty: 28 | Refills: 0
Start: 2022-05-14 | End: 2022-05-20

## 2022-05-14 RX ORDER — GLUCAGON INJECTION, SOLUTION 0.5 MG/.1ML
1 INJECTION, SOLUTION SUBCUTANEOUS ONCE
Refills: 0 | Status: DISCONTINUED | OUTPATIENT
Start: 2022-05-14 | End: 2022-05-14

## 2022-05-14 RX ORDER — SODIUM CHLORIDE 9 MG/ML
1000 INJECTION, SOLUTION INTRAVENOUS
Refills: 0 | Status: DISCONTINUED | OUTPATIENT
Start: 2022-05-14 | End: 2022-05-14

## 2022-05-14 RX ORDER — DEXTROSE 50 % IN WATER 50 %
12.5 SYRINGE (ML) INTRAVENOUS ONCE
Refills: 0 | Status: DISCONTINUED | OUTPATIENT
Start: 2022-05-14 | End: 2022-05-14

## 2022-05-14 RX ORDER — SENNOSIDES/DOCUSATE SODIUM 8.6MG-50MG
2 TABLET ORAL
Qty: 14 | Refills: 0
Start: 2022-05-14 | End: 2022-05-20

## 2022-05-14 RX ORDER — INSULIN LISPRO 100/ML
VIAL (ML) SUBCUTANEOUS AT BEDTIME
Refills: 0 | Status: DISCONTINUED | OUTPATIENT
Start: 2022-05-14 | End: 2022-05-14

## 2022-05-14 RX ORDER — DEXTROSE 50 % IN WATER 50 %
15 SYRINGE (ML) INTRAVENOUS ONCE
Refills: 0 | Status: DISCONTINUED | OUTPATIENT
Start: 2022-05-14 | End: 2022-05-14

## 2022-05-14 RX ADMIN — OXYCODONE HYDROCHLORIDE 10 MILLIGRAM(S): 5 TABLET ORAL at 06:46

## 2022-05-14 RX ADMIN — VALSARTAN 160 MILLIGRAM(S): 80 TABLET ORAL at 05:03

## 2022-05-14 RX ADMIN — OXYCODONE HYDROCHLORIDE 5 MILLIGRAM(S): 5 TABLET ORAL at 00:55

## 2022-05-14 RX ADMIN — OXYCODONE HYDROCHLORIDE 10 MILLIGRAM(S): 5 TABLET ORAL at 13:26

## 2022-05-14 RX ADMIN — AMLODIPINE BESYLATE 5 MILLIGRAM(S): 2.5 TABLET ORAL at 05:03

## 2022-05-14 RX ADMIN — OXYCODONE HYDROCHLORIDE 10 MILLIGRAM(S): 5 TABLET ORAL at 07:40

## 2022-05-14 RX ADMIN — Medication 81 MILLIGRAM(S): at 09:51

## 2022-05-14 RX ADMIN — OXYCODONE HYDROCHLORIDE 5 MILLIGRAM(S): 5 TABLET ORAL at 00:03

## 2022-05-14 RX ADMIN — OXYCODONE HYDROCHLORIDE 10 MILLIGRAM(S): 5 TABLET ORAL at 03:08

## 2022-05-14 RX ADMIN — OXYCODONE HYDROCHLORIDE 10 MILLIGRAM(S): 5 TABLET ORAL at 03:37

## 2022-05-14 RX ADMIN — OXYCODONE HYDROCHLORIDE 10 MILLIGRAM(S): 5 TABLET ORAL at 09:51

## 2022-05-14 RX ADMIN — METFORMIN HYDROCHLORIDE 1000 MILLIGRAM(S): 850 TABLET ORAL at 05:03

## 2022-05-14 RX ADMIN — ATORVASTATIN CALCIUM 10 MILLIGRAM(S): 80 TABLET, FILM COATED ORAL at 09:51

## 2022-05-14 NOTE — DISCHARGE NOTE NURSING/CASE MANAGEMENT/SOCIAL WORK - NSDCPEFALRISK_GEN_ALL_CORE
For information on Fall & Injury Prevention, visit: [email or web address] OR  [email or web address] OR  https://www.cdc.gov/steadi/patient.html
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
## 2022-05-14 NOTE — OCCUPATIONAL THERAPY INITIAL EVALUATION ADULT - ADDITIONAL COMMENTS
pt independent prior, no DME needs, lives with wife in private home, tub with curtains, has transfer tub bench

## 2022-05-14 NOTE — DISCHARGE NOTE NURSING/CASE MANAGEMENT/SOCIAL WORK - PATIENT PORTAL LINK FT
You can access the FollowMyHealth Patient Portal offered by [facility] by registering at the following website: [email or web address]. By joining [employer]’s FollowMyHealth portal, you will also be able to view your health information using other applications (apps) compatible with our system.

## 2022-05-14 NOTE — CONSULT NOTE ADULT - SUBJECTIVE AND OBJECTIVE BOX
Patient is a 56y old  Male who presents with a chief complaint of s/p Left TSA (14 May 2022 10:35)      HPI: 55 y/o male with PMH of presents with complaints or left shoulder pain. States he initially injured the shoulder in 1991 while scuba diving. States he has had pain ever since and it has been getting progressively worse. States he occasionally will reach to switch on the light switch and the shoulder will freeze. States she does have associated numbness/tingling in the right arm.      PAST MEDICAL & SURGICAL HISTORY:  History of squamous cell carcinoma excision  right cheek, 2015      Type 2 diabetes mellitus      Dyslipidemia      Hypertension      Depression      Multiple sclerosis      Sleep apnea  uses CPAP      Varicose veins  BLE      Bladder spasms/   neurogenic bladder       Neuropathy  bilateral hands      Ankle deformity, right      Obesity (BMI 30-39.9)      BMI 39.0-39.9,adult      Status post right foot surgery  2018          Social History:  Tobacco - denies   ETOH - 1-2 / monthly   Illicit drug abuse - denies    FAMILY HISTORY:  Family history of uterine cancer (Mother)    Family history of stomach cancer    Family history of uterine cancer (Grandparent)        Allergies    latex (Rash)  No Known Drug Allergies    Intolerances        HOME MEDICATIONS :     Home Medications:  amLODIPine 5 mg oral tablet: 1 tab(s) orally once a day (13 May 2022 06:22)  aspirin 81 mg oral delayed release tablet: 1 tab(s) orally once a day (13 May 2022 06:22)  atorvastatin 10 mg oral tablet: 1 tab(s) orally once a day (13 May 2022 06:22)  cetirizine 10 mg oral tablet: 1 tab(s) orally once a day (13 May 2022 06:22)  Jardiance 25 mg oral tablet: 1 tab(s) orally once a day (in the morning) (13 May 2022 06:22)  metFORMIN 1000 mg oral tablet: 1 tab(s) orally 2 times a day (13 May 2022 06:22)  MiraLax oral powder for reconstitution: 1 packet(s) orally once a day (13 May 2022 06:22)  Myrbetriq 25 mg oral tablet, extended release: 1 tab(s) orally once a day (13 May 2022 06:22)  Ocrevus 300 mg/10 mL intravenous solution: intravenous every 6 months (13 May 2022 06:22)  Rapaflo 8 mg oral capsule: 1 cap(s) orally once a day (13 May 2022 06:22)  Saxenda 18 mg/3 mL subcutaneous solution: 1  subcutaneous once a day (13 May 2022 06:22)  valsartan 160 mg oral tablet: 1 tab(s) orally once a day (13 May 2022 06:22)  venlafaxine 225 mg oral tablet, extended release: 1 tab(s) orally once a day (13 May 2022 06:22)  Vitamin D3 2000 intl units oral capsule: 1 cap(s) orally once a day (13 May 2022 06:22)  Wellbutrin  mg/24 hours oral tablet, extended release: 3 tab(s) orally every 24 hours (13 May 2022 06:22)      REVIEW OF SYSTEMS:    L shoulder chronic pain postop controlled with pain medications ,   did not sleep well last night , all other systems are reviewed and are negative .     MEDICATIONS  (STANDING):  amLODIPine   Tablet 5 milliGRAM(s) Oral daily  aspirin enteric coated 81 milliGRAM(s) Oral daily  atorvastatin Oral Tab/Cap - Peds 10 milliGRAM(s) Oral daily  ceFAZolin   IVPB 2000 milliGRAM(s) IV Intermittent every 8 hours  dextrose 5%. 1000 milliLiter(s) (50 mL/Hr) IV Continuous <Continuous>  dextrose 5%. 1000 milliLiter(s) (100 mL/Hr) IV Continuous <Continuous>  dextrose 50% Injectable 25 Gram(s) IV Push once  dextrose 50% Injectable 12.5 Gram(s) IV Push once  dextrose 50% Injectable 25 Gram(s) IV Push once  glucagon  Injectable 1 milliGRAM(s) IntraMuscular once  insulin lispro (ADMELOG) corrective regimen sliding scale   SubCutaneous three times a day before meals  insulin lispro (ADMELOG) corrective regimen sliding scale   SubCutaneous at bedtime  lactated ringers. 1000 milliLiter(s) (75 mL/Hr) IV Continuous <Continuous>  metFORMIN 1000 milliGRAM(s) Oral two times a day  senna 2 Tablet(s) Oral at bedtime  valsartan 160 milliGRAM(s) Oral daily    MEDICATIONS  (PRN):  dextrose Oral Gel 15 Gram(s) Oral once PRN Blood Glucose LESS THAN 70 milliGRAM(s)/deciliter  oxyCODONE    IR 5 milliGRAM(s) Oral every 3 hours PRN Moderate Pain (4 - 6)  oxyCODONE    IR 10 milliGRAM(s) Oral every 3 hours PRN Severe Pain (7 - 10)      Vital Signs Last 24 Hrs  T(C): 36.8 (14 May 2022 11:08), Max: 36.9 (13 May 2022 20:31)  T(F): 98.3 (14 May 2022 11:08), Max: 98.4 (13 May 2022 20:31)  HR: 80 (14 May 2022 11:08) (71 - 100)  BP: 120/64 (14 May 2022 11:08) (104/52 - 139/84)  BP(mean): --  RR: 18 (14 May 2022 11:08) (12 - 18)  SpO2: 95% (14 May 2022 11:08) (93% - 98%)    PHYSICAL EXAM:    GENERAL: NAD, well-groomed, well-developed  HEAD:  Atraumatic, Normocephalic  EYES: EOMI, PERRLA, conjunctiva and sclera clear  NECK: Supple, No JVD, Normal thyroid  NERVOUS SYSTEM:  Alert & Oriented X4 , no focal deficit ,   LUE in sling   CHEST/LUNG: CTA  b/l,  no rales, rhonchi, wheezing, or rubs  HEART: Regular rate and rhythm; No murmurs, rubs, or gallops  ABDOMEN: Soft, Nontender, Nondistended; Bowel sounds present  EXTREMITIES:  2+ Peripheral Pulses, No clubbing, cyanosis, or edema ,   LYMPH: No lymphadenopathy noted  SKIN: No rashes or lesions    LABS:                        12.9   9.95  )-----------( 180      ( 14 May 2022 07:29 )             41.2     05-14    138  |  101  |  18.3  ----------------------------<  118<H>  3.9   |  23.0  |  0.75    Ca    9.0      14 May 2022 07:29      RADIOLOGY & ADDITIONAL STUDIES:    < from: Xray Shoulder 2 Views, Left (05.13.22 @ 13:25) >    ACC: [id number] EXAM:  XR SHOULDER COMP MIN 2V LT                          PROCEDURE DATE:  05/13/2022          INTERPRETATION:  Radiographs of the LEFT shoulder    CLINICAL INFORMATION: LEFT shoulder Pain.    TECHNIQUE:  Frontal views in internal and external rotation..    FINDINGS:   No prior examinations are available for review.  Status post LEFT shoulder TSA. Prosthetic components in proper anatomical   alignment.  No fracture, dislocation or destructive bone lesion.  The acromioclavicular joint is aligned and intact.  No pathologic calcifications or soft tissue abnormalities.  The visualized lung upper zone and ribs are within normal limits..    IMPRESSION:   Status post LEFT shoulder TSA.    --- End of Report ---    < end of copied text >

## 2022-05-14 NOTE — PROGRESS NOTE ADULT - SUBJECTIVE AND OBJECTIVE BOX
ORTHOPEDIC POST-OP PROGRESS NOTE:  Name: REJI OLIVER    MR #: [id number]    Procedure: Left reverse TSA   Surgeon: Dr. Pendleton  DOS: 5/13      Pt comfortable without complaints, pain controlled. Denies CP, SOB, N/V, numbness/tingling       Vital Signs Last 24 Hrs  T(C): 36.5 (05-13-22 @ 15:53), Max: 36.5 (05-13-22 @ 15:53)  T(F): 97.7 (05-13-22 @ 15:53), Max: 97.7 (05-13-22 @ 15:53)  HR: 77 (05-13-22 @ 15:53) (77 - 77)  BP: 107/72 (05-13-22 @ 15:53) (107/72 - 107/72)  BP(mean): --  RR: 18 (05-13-22 @ 15:53) (18 - 18)  SpO2: 94% (05-13-22 @ 15:53) (94% - 94%)      General Exam:  General: Pt Alert and oriented, NAD, controlled pain.  Shoulder sling on and in place. Mepliex Dressing C/D/I. No bleeding ntoed. No erythema.  Pulses: 2+ radial pulse. BCR.  Sensation: Grossly intact to light touch without deficit.  Motor: +ROM of fingers.       A/P: 56y Male  s/p left reverse TSA POD# 0    - Stable  - Pain Control  - DVT ppx as prescribed  - PT eval  - Weight bearing status: NWB LUE, Pendulum exercises only 
Patient seen and examined at bedside.  Pain controlled, resting in chair.  Patient is in his sling.  No events overnight.  He denies any fevers, chills, sweats, or pain elsewhere.    ICU Vital Signs Last 24 Hrs  T(C): 36.8 (14 May 2022 04:32), Max: 36.9 (13 May 2022 20:31)  T(F): 98.2 (14 May 2022 04:32), Max: 98.4 (13 May 2022 20:31)  HR: 71 (14 May 2022 04:32) (71 - 100)  BP: 121/67 (14 May 2022 04:32) (104/52 - 139/84)  BP(mean): --  ABP: --  ABP(mean): --  RR: 16 (14 May 2022 04:32) (12 - 18)  SpO2: 93% (14 May 2022 04:32) (93% - 98%)    Gen: AAOx3, NAD  LUE: Patient's dressing is clean, dry, and intact.  There is mild periincisional tenderness to palpation.  There is mild postoperative swelling.  No erythema or warmth.  AIN/PIN/radial/ulnar/median/axillary/musculocutaneous motor function is intact, sensation is intact to light touch in C5-T1 distributions, radial pulses 2+, compartments are soft and compressible.        A/P: 56-year-old male status post left anatomic total shoulder arthroplasty with an inlay glenoid postop day 1    1.  Pain control  2.  Nonweightbearing of left upper extremity in sling  3.  PT: No Active Shoulder ROM. PROM , ER 30, IR Body, Ext 0. Pendulums permitted. Elbow/Wrist/Hand ROM Permitted  4.  SCDs  5.  H/H stable  6.  Patient has voided and is ambulating comfortably  7.  Plan to discharge home today  
Pt Name: REJI OLIVER  MRN: [id number]    Procedure: Left reverse TSA  Surgeon: Dr. Pendleton    Patient is a 56y Male s/p Left reverse TSA with Dr. Pendleton on 5/13/22. Patient seen and examined at bedside.  Pain is controlled with the prescribed medication, but he admits to discomfort with the sling placement. Denies CP, SOB, N/V, numbness/tingling. No other orthopedic complaints.          Vital Signs Last 24 Hrs  T(C): 36.8 (14 May 2022 04:32), Max: 36.9 (13 May 2022 20:31)  T(F): 98.2 (14 May 2022 04:32), Max: 98.4 (13 May 2022 20:31)  HR: 71 (14 May 2022 04:32) (71 - 100)  BP: 121/67 (14 May 2022 04:32) (104/52 - 139/84)  BP(mean): --  RR: 16 (14 May 2022 04:32) (12 - 18)  SpO2: 93% (14 May 2022 04:32) (93% - 98%)            PHYSICAL EXAM:    General: Patient alert and oriented, NAD.   Left Shoulder: Sling on and in place. Mepilex dressing C/D/I. No bleeding noted. No erythema to visible skin.  Pulses: 2+ radial pulse. Brisk capillary refill present to all fingers.   Sensation: Grossly intact to light touch without deficit.  Motor: +ROM of all fingers of the left hand.         A/P:  Pt is a  56y Male s/p left reverse TSA POD #1    PLAN:   * Pain control  * DVT prophylaxis as prescribed   * PT eval pending  * Weight Bearing Status: NWB Left UE in sling, Pendulum exercises only.    * Anticipate discharge to home today in the sling.

## 2022-05-14 NOTE — OCCUPATIONAL THERAPY INITIAL EVALUATION ADULT - GENERAL OBSERVATIONS, REHAB EVAL
pt received in bedside chair and left as found, CBWR, c/o left shoulder pain 5-6/10, +abd sling, and ice cryocuff in place

## 2022-05-14 NOTE — OCCUPATIONAL THERAPY INITIAL EVALUATION ADULT - RANGE OF MOTION EXAMINATION, UPPER EXTREMITY
right UE in sling, no shoulder ROM (pendulum exercises only)/Right UE Active ROM was WNL (within normal limits)

## 2022-05-14 NOTE — OCCUPATIONAL THERAPY INITIAL EVALUATION ADULT - THERAPY FREQUENCY, OT EVAL
[address] [place] [id number]  Phone: [phone]  Fax: [phone]    6/18/2019    Trung Osman Mony  1993  [address] [facility]      Dear Sandrita Soriano,    I regret to inform you that Dr. Soraida Luke, DO at [facility] will no longer be able to provide your medical care. I recommend that you immediately find a new physiatrist. We will continue to care for your urgent medical problems until 30 days from the date of this letter. To obtain your medical records, please contact [facility] at [phone] or the [address] at [phone]. You may contact your insurance provider, managed care organization or local [employer] Society to obtain a current listing of physicians available to provide care. I urge you to speak with your primary care provider quickly so that you can be referred elsewhere and there will be no interruption of your care. I wish you the best in your future medical care.       Sincerely,          Soraida Luke D.O.
daily

## 2022-05-16 RX ORDER — ATORVASTATIN CALCIUM 80 MG/1
TABLET, FILM COATED ORAL
Refills: 0 | Status: DISCONTINUED | COMMUNITY
End: 2022-05-16

## 2022-05-26 ENCOUNTER — APPOINTMENT (OUTPATIENT)
Dept: ORTHOPEDIC SURGERY | Facility: CLINIC | Age: 57
End: 2022-05-26
Payer: COMMERCIAL

## 2022-05-26 PROCEDURE — 73030 X-RAY EXAM OF SHOULDER: CPT | Mod: LT

## 2022-05-26 PROCEDURE — 99024 POSTOP FOLLOW-UP VISIT: CPT

## 2022-05-26 NOTE — HISTORY OF PRESENT ILLNESS
[de-identified] : Status post left anatomic total shoulder arthroplasty with an inlay glenoid on 5/13/2022 [de-identified] : Nirmal is a pleasant 56-year-old male who returns to the office today status post left anatomic total shoulder arthroplasty with an inlay glenoid on 5/13/2022.  The patient reports significant improvements in his pain since his hospital stay.  He has been compliant with his postoperative protocol and has remained nonweightbearing in his sling.  He has been doing gentle pendulums as well as elbow, hand, and wrist exercises.  He has not started with physical therapy as of yet.  He returns today for routine evaluation.  He denies any fevers, chills, sweats, numbness, tingling, or pain elsewhere. [de-identified] : On exam, the patient is pleasant.  They  are awake, alert, and oriented x3.  The patient walks into my office without an antalgic gait.\par Weight is appropriate for height\par Full range of motion of cervical spine without instability\par Full range of motion of back without instability\par Intact neurologic, vascular, and dermatologic exam to right and left upper extremities\par Intact neurologic, vascular, and dermatologic exam to right and left lower extremities\par \par Left upper Extremity\par The patient's incision is well approximated and healing well.  The incision was cleaned with alcohol and the Prineo scaffold was removed today in the office.  The patient tolerated this well.  No erythema, warmth, or signs of infection.  Mild cornelius-incisional tenderness. No bony tenderness to palpation about the shoulder. PROM: No pain with gentle range of motion of the shoulder. Strength not tested today.  AIN/PIN/radial/ulnar/median/musculocutaneous/axillary motor function is intact, sensations intact light touch in C5-T1 distributions, radial pulses 2+, compartments are soft and compressible. [de-identified] : X-rays including an AP and axillary view of the left shoulder were obtained in the office today on 5/26/2022 and reviewed the patient.  The patient is status post anatomic total shoulder resurfacing with inlay glenoid.  The hardware remains intact and in good position without any evidence of loosening or fracture. [de-identified] : 56-year-old male status post left anatomic total shoulder arthroplasty with an inlay glenoid on 5/13/2022 [de-identified] : Nirmal is recovering well from his recent surgery.  He has had significant improvements in his pain and swelling.  His incision is healing well.  At this point he will continue to remain nonweightbearing in a sling for 2 additional weeks.  He may do waist level activities including eating, drinking, writing, and typing.  He should avoid any active shoulder motion at this time.  He will continue with pendulum exercises on his own.  He should not lift anything heavier than a coffee mug with his operative arm for now.  A referral for physical therapy was provided today along with my postoperative protocol in order to begin gentle passive range of motion exercises for the shoulder.  We will limit external rotation to 3630 degrees until 6 weeks postop.  Recommend follow-up in 4 weeks for repeat clinical and radiographic evaluation.  He verbalizes understanding and agrees to the plan.  All questions were answered his satisfaction.

## 2022-05-31 ENCOUNTER — TRANSCRIPTION ENCOUNTER (OUTPATIENT)
Age: 57
End: 2022-05-31

## 2022-06-13 ENCOUNTER — TRANSCRIPTION ENCOUNTER (OUTPATIENT)
Age: 57
End: 2022-06-13

## 2022-06-27 ENCOUNTER — APPOINTMENT (OUTPATIENT)
Dept: ORTHOPEDIC SURGERY | Facility: CLINIC | Age: 57
End: 2022-06-27
Payer: COMMERCIAL

## 2022-06-27 VITALS
SYSTOLIC BLOOD PRESSURE: 129 MMHG | HEIGHT: 66 IN | HEART RATE: 75 BPM | DIASTOLIC BLOOD PRESSURE: 81 MMHG | BODY MASS INDEX: 37.77 KG/M2 | WEIGHT: 235 LBS

## 2022-06-27 DIAGNOSIS — Z96.612 PRESENCE OF LEFT ARTIFICIAL SHOULDER JOINT: ICD-10-CM

## 2022-06-27 PROCEDURE — 99024 POSTOP FOLLOW-UP VISIT: CPT

## 2022-06-27 NOTE — HISTORY OF PRESENT ILLNESS
[de-identified] : Status post left anatomic total shoulder arthroplasty with an inlay glenoid on 5/13/2022 [de-identified] : Nirmal is a pleasant 56-year-old male who returns to the office today status post left anatomic total shoulder arthroplasty with an inlay glenoid on 5/13/2022.  The patient states he is doing well and has had significant improvements in his pain since the day of surgery.  He has been wearing his sling part-time.  He has avoided any heavy lifting.  He has been going to physical therapy working on his range of motion.  He is pleased with his progress so far.  He returns today for routine evaluation.  The patient denies any fevers, chills, sweats, recent illnesses, numbness, tingling, weakness, or pain elsewhere at this time. [de-identified] : On exam, the patient is pleasant.  They  are awake, alert, and oriented x3.  The patient walks into my office without an antalgic gait.\par Weight is appropriate for height\par Full range of motion of cervical spine without instability\par Full range of motion of back without instability\par Intact neurologic, vascular, and dermatologic exam to right and left upper extremities\par Intact neurologic, vascular, and dermatologic exam to right and left lower extremities\par \par Left upper Extremity\par The patient's incision is well-healed.  No erythema, warmth, or signs of infection.  Mild cornelius-incisional tenderness. No bony tenderness to palpation about the shoulder. PROM: Forward flexion to 140, external rotation to 30, internal rotation to L4. Strength not tested today.  AIN/PIN/radial/ulnar/median/musculocutaneous/axillary motor function is intact, sensations intact light touch in C5-T1 distributions, radial pulses 2+, compartments are soft and compressible. [de-identified] : X-rays including an AP and axillary view of the left shoulder were obtained in the office today on 6/27/2022 and reviewed the patient.  The patient is status post anatomic total shoulder resurfacing with inlay glenoid.  The hardware remains intact and in good position without any evidence of loosening or fracture. [de-identified] : 56-year-old male status post left anatomic total shoulder arthroplasty with an inlay glenoid on 5/13/2022 [de-identified] : Nirmal is recovering well from his recent surgery.  He has had significant improvements in his pain and swelling.  His incision is well-healed.  He may discontinue the use of the sling at this time and gently progress his active range of motion of the shoulder.  We discussed his precautions and weightbearing restrictions.  We will continue with physical therapy and to continue with my postoperative protocol as per the instructions.  He will avoid any heavy lifting whatsoever.  He may continue to ice the shoulder as needed for any residual swelling and take Tylenol as needed for pain.  A new referral for physical therapy was provided today.  Recommend follow-up in 6 weeks for repeat clinical evaluation.  He verbalizes understanding and agrees to the plan.  All questions were answered to his satisfaction.

## 2022-07-19 NOTE — PHYSICAL EXAM
DEC @ Bedside   [Normal] : soft, non-tender, non-distended, no masses palpated, no HSM and normal bowel sounds

## 2022-07-25 ENCOUNTER — RX RENEWAL (OUTPATIENT)
Age: 57
End: 2022-07-25

## 2022-07-25 ENCOUNTER — TRANSCRIPTION ENCOUNTER (OUTPATIENT)
Age: 57
End: 2022-07-25

## 2022-07-26 ENCOUNTER — RX RENEWAL (OUTPATIENT)
Age: 57
End: 2022-07-26

## 2022-07-27 RX ORDER — LIRAGLUTIDE 6 MG/ML
18 INJECTION, SOLUTION SUBCUTANEOUS DAILY
Qty: 3 | Refills: 0 | Status: COMPLETED | COMMUNITY
Start: 2022-01-19 | End: 2022-07-27

## 2022-07-27 NOTE — PHYSICAL THERAPY INITIAL EVALUATION ADULT - PERTINENT HX OF CURRENT PROBLEM, REHAB EVAL
Right foot pain Cyclosporine Counseling:  I discussed with the patient the risks of cyclosporine including but not limited to hypertension, gingival hyperplasia,myelosuppression, immunosuppression, liver damage, kidney damage, neurotoxicity, lymphoma, and serious infections. The patient understands that monitoring is required including baseline blood pressure, CBC, CMP, lipid panel and uric acid, and then 1-2 times monthly CMP and blood pressure.

## 2022-08-10 ENCOUNTER — APPOINTMENT (OUTPATIENT)
Dept: ENDOCRINOLOGY | Facility: CLINIC | Age: 57
End: 2022-08-10

## 2022-08-10 ENCOUNTER — RX RENEWAL (OUTPATIENT)
Age: 57
End: 2022-08-10

## 2022-08-10 VITALS
SYSTOLIC BLOOD PRESSURE: 149 MMHG | TEMPERATURE: 97.8 F | HEIGHT: 66 IN | OXYGEN SATURATION: 96 % | DIASTOLIC BLOOD PRESSURE: 81 MMHG | BODY MASS INDEX: 37.28 KG/M2 | WEIGHT: 232 LBS | HEART RATE: 79 BPM

## 2022-08-10 PROCEDURE — 99214 OFFICE O/P EST MOD 30 MIN: CPT | Mod: 25

## 2022-08-10 PROCEDURE — 95251 CONT GLUC MNTR ANALYSIS I&R: CPT

## 2022-08-10 RX ORDER — FLUOROURACIL 50 MG/G
5 CREAM TOPICAL
Qty: 40 | Refills: 0 | Status: DISCONTINUED | COMMUNITY
Start: 2022-03-02

## 2022-08-10 RX ORDER — TAPENTADOL HYDROCHLORIDE 50 MG/1
50 TABLET, FILM COATED ORAL
Qty: 28 | Refills: 0 | Status: DISCONTINUED | COMMUNITY
Start: 2022-05-14

## 2022-08-10 NOTE — DATA REVIEWED
[FreeTextEntry1] : Bryan 8/10/2022:  Reviewed and interpreted with values mostly at goal with occasional hyperglycemia occasionally after a meal. No hypoglycemia. At goal 74% of the time. Hyperglycemic 26%. No hypoglycemia. Highest values 8/4-8/5 s/p Solu-Medrol. \par \par Labs 8/10/2022:\par A1c 6.8%\par \par Bryan 4/27/2022:  Reviewed and interpreted with values mostly at goal with occasional hyperglycemia occasionally after a meal. No hypoglycemia. At goal 80% of the time. Hyperglycemic 20%. No hypoglycemia. \par \par Labs 4/25//2022:\par A1c 6.8%\par CBC normal\par CMP normal except glucose of 133\par \par Bryan 1/19/2022:  Reviewed and interpreted with values mostly at goal with occasional hyperglycemia occasionally after a meal. No hypoglycemia. At goal 84% of the time. Hyperglycemic 16%. No hypoglycemia. \par \par Labs 1/19/2022:\par A1c 7.3%\par \par Bryan 9/15/2021:  Reviewed and interpreted with values mostly at goal with occasional hyperglycemia occasionally after a meal. No hypoglycemia. At goal 85% of the time. Hyperglycemic 15%. No hypoglycemia. \par \par Labs 9/15/2021:\par A1c 6.7%\par \par Bryan 6/15/2021:  Reviewed and interpreted with values mostly at goal with occasional hyperglycemia occasionally after a meal. No hypoglycemia. At goal 93% of the time. Hyperglycemic 7%\par \par Labs 6/15/2021:\par A1c 6.7%\par \par Bryan 5/3/2021: Reviewed and interpreted with values mostly at goal with occasional hyperglycemia usually early morning or after a meal.\par \par Labs 5/2021:\par Micro/Cr 28\par \par Labs 2/9/2021:\par A1c 7.9%\par CMP normal except glucose of 152\par Vit D 35.5

## 2022-08-10 NOTE — PHYSICAL EXAM
[Alert] : alert [Healthy Appearance] : healthy appearance [No Acute Distress] : no acute distress [EOMI] : extra ocular movement intact [Normal Hearing] : hearing was normal [Supple] : the neck was supple [Thyroid Not Enlarged] : the thyroid was not enlarged [No Respiratory Distress] : no respiratory distress [No Accessory Muscle Use] : no accessory muscle use [Normal Rate and Effort] : normal respiratory rate and effort [Clear to Auscultation] : lungs were clear to auscultation bilaterally [Normal S1, S2] : normal S1 and S2 [Normal Rate] : heart rate was normal [Regular Rhythm] : with a regular rhythm [Normal Bowel Sounds] : normal bowel sounds [Not Tender] : non-tender [Not Distended] : not distended [Soft] : abdomen soft [No Clubbing, Cyanosis] : no clubbing  or cyanosis of the fingernails [No Involuntary Movements] : no involuntary movements were seen [Right foot was examined, including] : right foot ~C was examined, including visual inspection with sensory and pulse exams [Left foot was examined, including] : left foot ~C was examined, including visual inspection with sensory and pulse exams [Normal] : normal [2+] : 2+ in the dorsalis pedis [Oriented x3] : oriented to person, place, and time [Normal Affect] : the affect was normal [Normal Mood] : the mood was normal [Kyphosis] : no kyphosis present [Acanthosis Nigricans] : no acanthosis nigricans [Acne] : no acne [Diminished Throughout Both Feet] : normal tactile sensation with monofilament testing throughout both feet [de-identified] : +trace LE edema b/l [FreeTextEntry6] : healed ulcer on right top 1st toe

## 2022-08-10 NOTE — ASSESSMENT
[FreeTextEntry1] : 55 y/o M w/\par \par 1. Uncontrolled Type 2 DM w/ hyperglycemia- Discussed the importance of improved glycemic control to prevent long term complications from diabetes. Goal A1c <7% Had been at goal on current regimen when better adherent to diet. His regimen is optimal in terms of medications with low risk of hypoglycemia and additional weight loss, CV, and renal benefits. Currently on metformin 1000 mg BID and Jardiance 25 mg daily. Recommended optimized dose of Saxenda to goal of 3 mg for additional weight loss and glycemic control benefits, but now not covered. Monitor glucose via Freestyle Bryan. Optho follow-up up to date. 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 Check micro/cr.\par \par 2. HTN- monitor BP on amlodipine and valsartan. BP above goal today, if persistently elevated can increase amlodipine to 10 mg daily.\par \par 3. HLD- c/w statin\par

## 2022-08-10 NOTE — HISTORY OF PRESENT ILLNESS
[FreeTextEntry1] : 55 y/o M w/ hx of Type 2 DM diagnosed in 2000 around the time of getting steroids for MS. Prior to 2000 was diagnosed with prediabetes. Started on insulin with steroids. Had been getting infusions every 6 months has been on hold for COVID. Infusions are premedicated with steroid so takes sliding scale of Humalog with meals and bedtime which he takes until his blood glucose comes down to normal. Last infusion 11/2021. Otherwise on metformin 1000 mg BID, Jardiance 25 mg daily, and Saxenda (switched from Victoza 1.8 mg daily for additional weight loss benefits 9/2021). Has been more adherent to low carb diet. Checks glucose >4x/day with Freestyle Bryan with average glucose 140. Reported hypoglycemia with symptoms of hypoglycemia when Saxenda was increased to 3mg, decreased to 1.8mg daily, then increased back to 3mg for hyperglycemia. Was told no longer covered. Looking to switch back to Victoza. +polyuria with neurogenic bladder on Mybetriq, no polydipsia unless eats too much glucose. No nausea or vomiting. Had noticed some increased indigestion on Saxenda. Lost a few lbs since last visit.  \par Follows with podiatry  Dr. Hearn for routine diabetes foot care. No hx of neuropathy in feet. \par No hx of nephropathy\par No hx of retinopathy. Last retinal exam with Dr. Anne 10/2021. \par \par HTN on valsartan and amlodipine\par \par HLD on statin\par

## 2022-08-11 ENCOUNTER — APPOINTMENT (OUTPATIENT)
Dept: ORTHOPEDIC SURGERY | Facility: CLINIC | Age: 57
End: 2022-08-11

## 2022-08-11 VITALS
BODY MASS INDEX: 37.28 KG/M2 | DIASTOLIC BLOOD PRESSURE: 85 MMHG | HEIGHT: 66 IN | HEART RATE: 77 BPM | WEIGHT: 232 LBS | SYSTOLIC BLOOD PRESSURE: 137 MMHG

## 2022-08-11 PROCEDURE — 73030 X-RAY EXAM OF SHOULDER: CPT | Mod: LT

## 2022-08-11 PROCEDURE — 99024 POSTOP FOLLOW-UP VISIT: CPT

## 2022-08-11 NOTE — HISTORY OF PRESENT ILLNESS
[de-identified] : Status post left anatomic total shoulder arthroplasty with an inlay glenoid on 5/13/2022 [de-identified] : Nirmal is a pleasant 56-year-old male who returns to the office today status post left anatomic total shoulder arthroplasty with an inlay glenoid on 5/13/2022.  The patient states he is doing well and has had improvements in his pain and range of motion since today's surgery.  He has been compliant with his postoperative protocol.  He has weaned out of his sling and has been going to physical therapy and has noticed improvements in his range of motion.  He is pleased with his progress so far returns today for routine evaluation.  He denies any fevers, chills, sweats, numbness, tingling, or pain elsewhere. [de-identified] : On exam, the patient is pleasant.  They  are awake, alert, and oriented x3.  The patient walks into my office without an antalgic gait.\par Weight is appropriate for height\par Full range of motion of cervical spine without instability\par Full range of motion of back without instability\par Intact neurologic, vascular, and dermatologic exam to right and left upper extremities\par Intact neurologic, vascular, and dermatologic exam to right and left lower extremities\par \par Left upper Extremity\par The patient's incision is well-healed.  No erythema, warmth, or signs of infection.  No cornelius-incisional tenderness. No bony tenderness to palpation about the shoulder. AROM: Forward flexion to 175, external rotation to 45, internal rotation to T12. Strength: Supraspinatus 4+/5, infraspinatus 4+/5, subscapularis 4+/5.  AIN/PIN/radial/ulnar/median/musculocutaneous/axillary motor function is intact, sensations intact light touch in C5-T1 distributions, radial pulses 2+, compartments are soft and compressible. [de-identified] : X-rays including an AP and axillary view of the left shoulder were obtained in the office today on 8/11/2022 and reviewed the patient.  The patient is status post anatomic total shoulder resurfacing with inlay glenoid.  The hardware remains intact and in good position without any evidence of loosening or fracture.  Humeral head is centered in the glenoid on axillary imaging. [de-identified] : 56-year-old male status post left anatomic total shoulder arthroplasty with an inlay glenoid on 5/13/2022 [de-identified] : Nirmal is recovering well from his recent surgery.  He has had substantial improvements in his pain and range of motion since today's surgery.  At this point he will continue with active motion of the shoulder in all planes.  He will avoid any excessive heavy or overhead lifting at this time.  He will avoid any sports.  He may swim but will not do any aggressive freestyle swimming at this point.  A new referral for physical therapy was provided today to begin resistance and strengthening exercises in a gradual manner.  Recommend follow-up in 3 months for repeat clinical evaluation.  He verbalizes understanding and agrees with the plan.  All questions were answered to his satisfaction.

## 2022-09-29 ENCOUNTER — NON-APPOINTMENT (OUTPATIENT)
Age: 57
End: 2022-09-29

## 2022-09-29 ENCOUNTER — TRANSCRIPTION ENCOUNTER (OUTPATIENT)
Age: 57
End: 2022-09-29

## 2022-09-29 ENCOUNTER — APPOINTMENT (OUTPATIENT)
Dept: FAMILY MEDICINE | Facility: CLINIC | Age: 57
End: 2022-09-29

## 2022-09-29 PROCEDURE — 99213 OFFICE O/P EST LOW 20 MIN: CPT | Mod: 95

## 2022-09-29 NOTE — REVIEW OF SYSTEMS
[Chills] : chills [Fatigue] : fatigue [Postnasal Drip] : postnasal drip [Negative] : Psychiatric [FreeTextEntry4] : nasal and sinus congestion

## 2022-09-29 NOTE — ASSESSMENT
[FreeTextEntry1] : #covid 19 infection\par - hx of MS, obesity, DM2, \par - recommend MAB antiviral vs paxlovid due to drug interactions with home meds.\par Reviewed documents \par Reviewed all medications and allergies.\par Discussion held below about\par Continue to wear mask\par Continue to be safe in public places\par Covering cough and sneezes\par Maintaining a distance of at least six feet from other family members\par Washing hands regularly\par Disinfecting surfaces\par Self-isolating from others at home\par stay home for 5 days and quarantine \par Continue to monitor pulse ox and temp twice a day\par If experience any of the below go to ED\par Trouble breathing.\par Persistent pain or pressure in the chest.\par New confusion.\par Inability to wake or stay awake.\par Pale, gray, or blue-colored skin, lips, or nail beds, depending on skin tone.\par Pt agrees and understands plan via teach back method\par All questions answered.\par

## 2022-09-29 NOTE — HISTORY OF PRESENT ILLNESS
[Home] : at home, [unfilled] , at the time of the visit. [Medical Office: ([facility])___] : at the medical office located in  [Verbal consent obtained from patient] : the patient, [unfilled] [FreeTextEntry8] : 56 year old male\par presents with x 2 days\par Rhinitis, cough, congestion, throat irritation, body aches\par Denies fever,  earache\par Denies SOB or wheezing or CP or palpitations\par Denies NVD\par Denies HA\par Pt today felt very fatigued after walking with his dog\par Pt tested positive today.\par Pt went on monday to an event - [employer] - approx 600 people. \par Temp 97.5\par

## 2022-09-29 NOTE — PHYSICAL EXAM
[No Acute Distress] : no acute distress [Well Nourished] : well nourished [Well Developed] : well developed [Well-Appearing] : well-appearing [de-identified] : deferred tele visit.

## 2022-09-30 ENCOUNTER — OUTPATIENT (OUTPATIENT)
Dept: OUTPATIENT SERVICES | Facility: HOSPITAL | Age: 57
LOS: 1 days | End: 2022-09-30

## 2022-09-30 ENCOUNTER — APPOINTMENT (OUTPATIENT)
Dept: DISASTER EMERGENCY | Facility: HOSPITAL | Age: 57
End: 2022-09-30

## 2022-09-30 VITALS
OXYGEN SATURATION: 97 % | HEIGHT: 66 IN | DIASTOLIC BLOOD PRESSURE: 81 MMHG | HEART RATE: 80 BPM | SYSTOLIC BLOOD PRESSURE: 148 MMHG | TEMPERATURE: 99 F | RESPIRATION RATE: 18 BRPM | WEIGHT: 235.01 LBS

## 2022-09-30 VITALS
RESPIRATION RATE: 17 BRPM | HEART RATE: 80 BPM | SYSTOLIC BLOOD PRESSURE: 138 MMHG | OXYGEN SATURATION: 95 % | TEMPERATURE: 99 F | DIASTOLIC BLOOD PRESSURE: 79 MMHG

## 2022-09-30 DIAGNOSIS — Z98.890 OTHER SPECIFIED POSTPROCEDURAL STATES: Chronic | ICD-10-CM

## 2022-09-30 DIAGNOSIS — U07.1 COVID-19: ICD-10-CM

## 2022-09-30 RX ORDER — BEBTELOVIMAB 87.5 MG/ML
175 INJECTION, SOLUTION INTRAVENOUS ONCE
Refills: 0 | Status: COMPLETED | OUTPATIENT
Start: 2022-09-30 | End: 2022-09-30

## 2022-09-30 RX ADMIN — BEBTELOVIMAB 175 MILLIGRAM(S): 87.5 INJECTION, SOLUTION INTRAVENOUS at 08:08

## 2022-09-30 NOTE — MONOCLONAL ANTIBODY INFUSION - EXAM
CC: Monoclonal Antibody Infusion/COVID 19 Positive  56yMale with MS, HTN, DM    exam/findings:  T(C): 37.1 (09-30-22 @ 07:55), Max: 37.1 (09-30-22 @ 07:55)  HR: 80 (09-30-22 @ 07:55) (80 - 80)  BP: 148/81 (09-30-22 @ 07:55) (148/81 - 148/81)  RR: 18 (09-30-22 @ 07:55) (18 - 18)  SpO2: 97% (09-30-22 @ 07:55) (97% - 97%)      PE:   Appearance: NAD	  HEENT:   Normal oral mucosa,   Lymphatic: No lymphadenopathy  Cardiovascular: Normal S1 S2, No JVD, No murmurs, No edema  Respiratory: Lungs clear to auscultation	  Gastrointestinal:  Soft, Non-tender, + BS	  Skin: warm and dry  Neurologic: Non-focal  Extremities: Normal range of motion

## 2022-09-30 NOTE — MONOCLONAL ANTIBODY INFUSION - ASSESSMENT AND PLAN
ASSESSMENT:  Pt is a -56 years old male with -Covid + on 9/29, onset on 9/28  referred by Dr. Willard .who presents to infusion center for Monoclonal antibody infusion (Bebtelovimab). Patient is vaccinated and boosted with Pfizer  Symptoms/ Criteria: body ache, running nose, headache, cough, chest congestion  Risk Profile includes: MS, HTN, DM,    PLAN:  - infusion procedure explained to patient   - Consent for monoclonal antibody infusion obtained   - Risk & benefits discussed/all questions answered  - Bebtelovimab 175mg IVP over 30 seconds  - observe patient for one hour post infusion and discharge home

## 2022-11-17 ENCOUNTER — APPOINTMENT (OUTPATIENT)
Dept: ORTHOPEDIC SURGERY | Facility: CLINIC | Age: 57
End: 2022-11-17

## 2022-11-17 VITALS
WEIGHT: 232 LBS | HEART RATE: 64 BPM | HEIGHT: 66 IN | DIASTOLIC BLOOD PRESSURE: 87 MMHG | BODY MASS INDEX: 37.28 KG/M2 | SYSTOLIC BLOOD PRESSURE: 151 MMHG

## 2022-11-17 PROCEDURE — 71045 X-RAY EXAM CHEST 1 VIEW: CPT

## 2022-11-17 PROCEDURE — 73030 X-RAY EXAM OF SHOULDER: CPT | Mod: LT

## 2022-11-17 PROCEDURE — 99213 OFFICE O/P EST LOW 20 MIN: CPT

## 2022-11-17 NOTE — HISTORY OF PRESENT ILLNESS
[de-identified] : Status post left anatomic total shoulder arthroplasty with an inlay glenoid on 5/13/2022 [de-identified] : Nirmal is a pleasant 56-year-old male who returns to the office today status post left anatomic total shoulder arthroplasty with an inlay glenoid on 5/13/2022.  Patient is doing very well and is satisfied with his outcome.  He has no pain at all in the shoulder with full range of motion and improving strength.  He has been doing exercises on his own to optimize his strength and function.  He did have a recent fall onto his right side and is complaining of right-sided rib pain.  Otherwise he is doing very well and is quite pleased with his outcome.  The patient denies any fevers, chills, sweats, recent illnesses, numbness, tingling, weakness, or pain elsewhere at this time. [de-identified] : On exam, the patient is pleasant.  They  are awake, alert, and oriented x3.  The patient walks into my office without an antalgic gait.\par Weight is appropriate for height\par Full range of motion of cervical spine without instability\par Full range of motion of back without instability\par Intact neurologic, vascular, and dermatologic exam to right and left upper extremities\par Intact neurologic, vascular, and dermatologic exam to right and left lower extremities\par \par Left upper Extremity\par The patient's incision is well-healed.  No erythema, warmth, or signs of infection.  No cornelius-incisional tenderness. No bony tenderness to palpation about the shoulder. AROM: Forward flexion to 180, external rotation to 45, internal rotation to T12. Strength: Supraspinatus 4+/5, infraspinatus 5/5, subscapularis 5/5.  AIN/PIN/radial/ulnar/median/musculocutaneous/axillary motor function is intact, sensations intact light touch in C5-T1 distributions, radial pulses 2+, compartments are soft and compressible.  There is mild tenderness palpation about the ribs on the right side. [de-identified] : X-rays including an AP and axillary view of the left shoulder were obtained in the office today on  11/17/2022 and reviewed the patient.  The patient is status post anatomic total shoulder resurfacing with inlay glenoid.  The hardware remains intact and in good position without any evidence of loosening or fracture.  Humeral head is centered in the glenoid on axillary imaging.\par \par Chest x-ray taken in the office today on 11/17/2022 reveals no acute fracture or dislocation.  Given the limitations of our scanner we are unable to completely visualize the area of maximal tenderness on the patient's examination and the possibility of a rib fracture is not excluded. [de-identified] : 56-year-old male status post left anatomic total shoulder arthroplasty with an inlay glenoid on 5/13/2022 [de-identified] : Nirmal has recovered very well from his surgery.  He has no pain and full range of motion.  He has no evidence of instability.  His x-rays demonstrate good hardware positioning without any evidence of loosening or instability.  At this point he is cleared to return to his normal activities as tolerated without restriction.  He may continue to work on progressive strengthening exercises for the shoulder to help improve and optimize his overall function.  We discussed the need for antibiotic prophylaxis prior to any planned future dental procedures.  I explained to the patient that the chest x-ray obtained in the office is a limited field-of-view due to the scanner used and we are unable to completely view the area of maximal tenderness on his examination.  However, his pain is low-grade.  If he does have a rib fracture I would recommend symptomatic treatment at this point with Tylenol, rest, and avoidance of laying on his right side.  He states he already feels very much improved may gradually return to his activities over the next 4 weeks.  Recommend follow-up in 6 months for repeat evaluation.  He verbalizes understanding and agrees with the plan.  All questions answered to his satisfaction.

## 2022-11-22 ENCOUNTER — NON-APPOINTMENT (OUTPATIENT)
Age: 57
End: 2022-11-22

## 2022-11-23 ENCOUNTER — APPOINTMENT (OUTPATIENT)
Dept: ENDOCRINOLOGY | Facility: CLINIC | Age: 57
End: 2022-11-23

## 2022-11-23 VITALS
WEIGHT: 235 LBS | DIASTOLIC BLOOD PRESSURE: 77 MMHG | SYSTOLIC BLOOD PRESSURE: 144 MMHG | TEMPERATURE: 97.2 F | OXYGEN SATURATION: 96 % | BODY MASS INDEX: 37.77 KG/M2 | RESPIRATION RATE: 16 BRPM | HEART RATE: 69 BPM | HEIGHT: 66 IN

## 2022-11-23 LAB — HBA1C MFR BLD HPLC: 7.2

## 2022-11-23 PROCEDURE — 83036 HEMOGLOBIN GLYCOSYLATED A1C: CPT | Mod: QW

## 2022-11-23 PROCEDURE — 99214 OFFICE O/P EST MOD 30 MIN: CPT | Mod: 25

## 2022-11-23 PROCEDURE — 95251 CONT GLUC MNTR ANALYSIS I&R: CPT

## 2022-11-23 NOTE — ASSESSMENT
[FreeTextEntry1] : 57 y/o M w/\par \par 1. Uncontrolled Type 2 DM w/ hyperglycemia- Discussed the importance of improved glycemic control to prevent long term complications from diabetes. Goal A1c <7% Had been at goal on current regimen when better adherent to diet. His regimen is optimal in terms of medications with low risk of hypoglycemia and additional weight loss, CV, and renal benefits. Currently on metformin 1000 mg BID and Jardiance 25 mg daily. Recommended optimized dose of Saxenda to goal of 3 mg for additional weight loss and glycemic control benefits, but now not covered. Monitor glucose via Freestyle Bryan. Optho follow-up up to date. 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 Check micro/cr.\par \par 2. HTN- monitor BP on amlodipine and valsartan. BP above goal today, if persistently elevated can increase amlodipine to 10 mg daily.\par \par 3. HLD- c/w statin\par

## 2022-11-23 NOTE — HISTORY OF PRESENT ILLNESS
[FreeTextEntry1] : 55 y/o M w/ hx of Type 2 DM diagnosed in 2000 around the time of getting steroids for MS. Prior to 2000 was diagnosed with prediabetes. Started on insulin with steroids. Had been getting infusions every 6 months has been on hold for COVID. Infusions are premedicated with steroid so takes sliding scale of Humalog with meals and bedtime which he takes until his blood glucose comes down to normal. Last infusion 11/2021. Otherwise on metformin 1000 mg BID, Jardiance 25 mg daily, and Victoza 1.8 mg daily. Had COVID 9/2022 with hyperglycemia with some nonadherence to meds around that time. Now improved. Has been more adherent to low carb diet. Checks glucose >4x/day with Freestyle Bryan with average glucose 136. Reported hypoglycemia with symptoms of hypoglycemia when on Saxenda. Was told no longer covered. +polyuria with neurogenic bladder on Mybetriq, no polydipsia unless eats too much glucose. No nausea or vomiting. Had noticed some increased indigestion on GLP-1. Gained a few lbs since last visit.  \par Follows with podiatry  Dr. Hearn for routine diabetes foot care. No hx of neuropathy in feet. \par No hx of nephropathy\par No hx of retinopathy. Last retinal exam with Dr. Anne 10/2021. Will be making an appointment. \par Had mechanical fall on college tour with son. Fractures right rib. No other fractures.\par \par HTN on valsartan and amlodipine\par \par HLD on statin\par

## 2022-11-23 NOTE — PHYSICAL EXAM
[Alert] : alert [Healthy Appearance] : healthy appearance [No Acute Distress] : no acute distress [EOMI] : extra ocular movement intact [Normal Hearing] : hearing was normal [Supple] : the neck was supple [Thyroid Not Enlarged] : the thyroid was not enlarged [No Respiratory Distress] : no respiratory distress [No Accessory Muscle Use] : no accessory muscle use [Normal Rate and Effort] : normal respiratory rate and effort [Clear to Auscultation] : lungs were clear to auscultation bilaterally [Normal S1, S2] : normal S1 and S2 [Normal Rate] : heart rate was normal [Regular Rhythm] : with a regular rhythm [Normal Bowel Sounds] : normal bowel sounds [Not Tender] : non-tender [Not Distended] : not distended [Soft] : abdomen soft [No Clubbing, Cyanosis] : no clubbing  or cyanosis of the fingernails [No Involuntary Movements] : no involuntary movements were seen [Right foot was examined, including] : right foot ~C was examined, including visual inspection with sensory and pulse exams [Left foot was examined, including] : left foot ~C was examined, including visual inspection with sensory and pulse exams [Normal] : normal [2+] : 2+ in the dorsalis pedis [Oriented x3] : oriented to person, place, and time [Normal Affect] : the affect was normal [Normal Mood] : the mood was normal [Kyphosis] : no kyphosis present [Acanthosis Nigricans] : no acanthosis nigricans [Acne] : no acne [Diminished Throughout Both Feet] : normal tactile sensation with monofilament testing throughout both feet [de-identified] : +trace LE edema b/l [FreeTextEntry6] : healed ulcer on right top 1st toe

## 2022-11-30 LAB
25(OH)D3 SERPL-MCNC: 33.8 NG/ML
ALBUMIN SERPL ELPH-MCNC: 5 G/DL
ALP BLD-CCNC: 83 U/L
ALT SERPL-CCNC: 61 U/L
ANION GAP SERPL CALC-SCNC: 13 MMOL/L
AST SERPL-CCNC: 35 U/L
BASOPHILS # BLD AUTO: 0.06 K/UL
BASOPHILS NFR BLD AUTO: 0.8 %
BILIRUB SERPL-MCNC: 0.3 MG/DL
BUN SERPL-MCNC: 18 MG/DL
CALCIUM SERPL-MCNC: 10.2 MG/DL
CHLORIDE SERPL-SCNC: 102 MMOL/L
CHOLEST SERPL-MCNC: 178 MG/DL
CO2 SERPL-SCNC: 25 MMOL/L
CREAT SERPL-MCNC: 0.82 MG/DL
CREAT SPEC-SCNC: 64 MG/DL
EGFR: 102 ML/MIN/1.73M2
EOSINOPHIL # BLD AUTO: 0.23 K/UL
EOSINOPHIL NFR BLD AUTO: 2.9 %
GLUCOSE SERPL-MCNC: 103 MG/DL
HCT VFR BLD CALC: 49.3 %
HDLC SERPL-MCNC: 61 MG/DL
HGB BLD-MCNC: 15.2 G/DL
IMM GRANULOCYTES NFR BLD AUTO: 0.4 %
LDLC SERPL CALC-MCNC: 95 MG/DL
LYMPHOCYTES # BLD AUTO: 2.58 K/UL
LYMPHOCYTES NFR BLD AUTO: 32.5 %
MAN DIFF?: NORMAL
MCHC RBC-ENTMCNC: 27.9 PG
MCHC RBC-ENTMCNC: 30.8 GM/DL
MCV RBC AUTO: 90.5 FL
MICROALBUMIN 24H UR DL<=1MG/L-MCNC: 1.6 MG/DL
MICROALBUMIN/CREAT 24H UR-RTO: 24 MG/G
MONOCYTES # BLD AUTO: 0.64 K/UL
MONOCYTES NFR BLD AUTO: 8.1 %
NEUTROPHILS # BLD AUTO: 4.39 K/UL
NEUTROPHILS NFR BLD AUTO: 55.3 %
NONHDLC SERPL-MCNC: 117 MG/DL
PLATELET # BLD AUTO: 240 K/UL
POTASSIUM SERPL-SCNC: 5.1 MMOL/L
PROT SERPL-MCNC: 7.2 G/DL
PSA SERPL-MCNC: 3.04 NG/ML
RBC # BLD: 5.45 M/UL
RBC # FLD: 16.3 %
SODIUM SERPL-SCNC: 140 MMOL/L
T4 FREE SERPL-MCNC: 1 NG/DL
TRIGL SERPL-MCNC: 112 MG/DL
TSH SERPL-ACNC: 1.53 UIU/ML
WBC # FLD AUTO: 7.93 K/UL

## 2022-12-06 ENCOUNTER — RX RENEWAL (OUTPATIENT)
Age: 57
End: 2022-12-06

## 2023-01-06 NOTE — PATIENT PROFILE ADULT. - PRO ANTICIPATED DISCH DISP
Department of Anesthesiology  Postprocedure Note    Patient: Nimo Perez  MRN: [id number]  YOB: 1949  Date of evaluation: 1/6/2023      Procedure Summary     Date: 01/06/23 Room / Location: [address] [place] [address] 01 / [facility] [facility]    Anesthesia Start: 0103 Anesthesia Stop: 0470    Procedure: EGD ESOPHAGOGASTRODUODENOSCOPY DILATATION Diagnosis:       Anemia, unspecified type      (Anemia, unspecified type [D64.9])    Surgeons: Yary Pete MD Responsible Provider: Rohit Clements MD    Anesthesia Type: MAC ASA Status: 4          Anesthesia Type: No value filed.     Kaley Phase I: Kaley Score: 9    Kaley Phase II:        Anesthesia Post Evaluation    Patient location during evaluation: bedside  Patient participation: complete - patient participated  Level of consciousness: awake  Airway patency: patent  Nausea & Vomiting: no nausea and no vomiting  Complications: no  Cardiovascular status: hemodynamically stable  Respiratory status: acceptable  Hydration status: euvolemic
unsure

## 2023-01-17 ENCOUNTER — TRANSCRIPTION ENCOUNTER (OUTPATIENT)
Age: 58
End: 2023-01-17

## 2023-01-30 DIAGNOSIS — Z47.1 AFTERCARE FOLLOWING JOINT REPLACEMENT SURGERY: ICD-10-CM

## 2023-01-30 DIAGNOSIS — Z96.619 AFTERCARE FOLLOWING JOINT REPLACEMENT SURGERY: ICD-10-CM

## 2023-01-31 ENCOUNTER — APPOINTMENT (OUTPATIENT)
Dept: PULMONOLOGY | Facility: CLINIC | Age: 58
End: 2023-01-31
Payer: COMMERCIAL

## 2023-01-31 ENCOUNTER — NON-APPOINTMENT (OUTPATIENT)
Age: 58
End: 2023-01-31

## 2023-01-31 VITALS
WEIGHT: 237 LBS | TEMPERATURE: 97.7 F | BODY MASS INDEX: 38.09 KG/M2 | RESPIRATION RATE: 16 BRPM | DIASTOLIC BLOOD PRESSURE: 80 MMHG | OXYGEN SATURATION: 98 % | SYSTOLIC BLOOD PRESSURE: 140 MMHG | HEART RATE: 90 BPM | HEIGHT: 66 IN

## 2023-01-31 DIAGNOSIS — E66.3 OVERWEIGHT: ICD-10-CM

## 2023-01-31 PROCEDURE — 94010 BREATHING CAPACITY TEST: CPT

## 2023-01-31 PROCEDURE — ZZZZZ: CPT

## 2023-01-31 PROCEDURE — 99214 OFFICE O/P EST MOD 30 MIN: CPT | Mod: 25

## 2023-01-31 NOTE — ASSESSMENT
[FreeTextEntry1] : Mr. OLIVER is a 57 year old male with a history of HLD, RADS, HTN, DM, MS, JERRELL, and allergies who comes into the office today for a follow up pulmonary evaluation - His number one issue is still weight. (improving) - stable except frustrated w/ DME\par \par The patient's shortness of breath is multifactorial due to:\par -pulmonary disease \par      -has risk factors for asthma (allergy)\par -poor breathing mechanics \par -overweight/out of shape\par -CAD  \par \par Problem 1: Allergy / Sinus - active\par -Continue using Zyrtec 5 mg QHS \par -Environmental measures for allergies were encouraged including mattress and pillow cover, air purifier, and environmental controls. \par \par Problem 2: JERRELL (compliant) \par -s/p split night sleep study for memory concentration, nocturia, daily fatigue, metabolism.\par -Resmed Fx wide full nasal mask (medium), with 8 cm pressure ([facility])\par -Sleep apnea is associated with adverse clinical consequences which an affect most organ systems. Cardiovascular disease risk includes arrhythmias, atrial fibrillation, hypertension, coronary artery disease, and stroke. Metabolic disorders include diabetes type 2, non-alcoholic fatty liver disease. Mood disorder especially depression; and cognitive decline especially in the elderly. Associations with chronic reflux/Morris’s esophagus some but not all inclusive. \par -Reasons include arousal consistent with hypopnea; respiratory events most prominent in REM sleep or supine position; therefore sleep staging and body position are important for accurate diagnosis and estimation of AHI. \par \par Problem 3: Cardiac\par -Recommended to follow up with cardiologist if needed\par \par Problem 4: Poor Mechanics of Breathing\par -Recommended Wim Hof and Buteyko breathing techniques \par - Proper breathing techniques were reviewed with an emphasis of exhalation. Patient instructed to breath in for 1 second and out for four seconds. Patient was encouraged to not talk while walking. \par \par Problem 5: overweight/out of shape\par -Recommend "Muniq" OTC Supplement for weight loss, energy levels, and blood sugar levels \par - Weight loss, exercise and diet control were discussed and are highly encouraged. Treatment options were given such as aqua therapy, and contacting a nutritionist. Recommended to use the elliptical, stationary bike, less use of treadmill. Mindful eating was explained to the patient. Obesity is associated with worsening asthma, SOB, and potential for cardiac disease, diabetes, and other underlying medical conditions.\par \par Problem 6: Health Maintenance/COVID19 Precautions:\par -recommended SaNOtize nasal spray \par -s/p Pfizer COVID 19 vaccine x4\par - Clean your hands often. Wash your hands often with soap and water for at least 20 seconds, especially after blowing your nose, coughing, or sneezing, or having been in a public place.\par - If soap and water are not available, use a hand  that contains at least 60% alcohol.\par - To the extent possible, avoid touching high-touch surfaces in public places - elevator buttons, door handles, handrails, handshaking with people, etc. Use a tissue or your sleeve to cover your hand or finger if you must touch something.\par - Wash your hands after touching surfaces in public places.\par - Avoid touching your face, nose, eyes, etc.\par - Clean and disinfect your home to remove germs: practice routine cleaning of frequently touched surfaces (for example: tables, doorknobs, light switches, handles, desks, toilets, faucets, sinks & cell phones)\par - Avoid crowds, especially in poorly ventilated spaces. Your risk of exposure to respiratory viruses like COVID-19 may increase in crowded, closed-in settings with little air circulation if there are people in the crowd who are sick. All patients are recommended to practice social distancing and stay at least 6 feet away from others.\par - Avoid all non-essential travel including plane trips, and especially avoid embarking on cruise ships.\par -If COVID-19 is spreading in your community, take extra measures to put distance between yourself and other people to further reduce your risk of being exposed to this new virus.\par -Stay home as much as possible.\par - Consider ways of getting food brought to your house through family, social, or commercial networks\par -Be aware that the virus has been known to live in the air up to 3 hours post exposure, cardboard up to 24 hours post exposure, copper up to 4 hours post exposure, steel and plastic up to 2-3 days post exposure. Risk of transmission from these surfaces are affected by many variables.\par Immune Support Recommendations:\par -OTC Vitamin C 500mg BID \par -OTC Quercetin 250-500mg BID \par -OTC Zinc 75-100mg per day \par -OTC Melatonin 1 or 2 mg a night \par -OTC Vitamin D 1-4000mg per day \par -OTC Tonic Water 8oz per day\par Asthma and COVID19:\par You need to make sure your asthma is under control. This often requires the use of inhaled corticosteroids (and sometimes oral corticosteroids). Inhaled corticosteroids do not likely reduce your immune system’s ability to fight infections, but oral corticosteroids may. It is important to use the steps above to protect yourself to limit your exposure to any respiratory virus.\par \par Problem 7: health maintenance\par -s/p influenza vaccine - 2022\par -recommended strep pneumonia vaccines after age 65: Prevnar-13 vaccine, followed by Pneumo vaccine 23 one year following\par -recommended early intervention for URIs\par -recommended regular osteoporosis evaluations\par -recommended early dermatological evaluations\par -recommended after the age of 50 to the age of 70, colonoscopy every 5 years \par \par \par  Follow up in 4 months - SPI\par -he  is recommended to call with any changes, questions, or concerns.

## 2023-01-31 NOTE — PROCEDURE
[FreeTextEntry1] : PFT reveals normal flows, with an FEV1 of  3.65L, which is 116% of predicted, with a normal flow volume loop.

## 2023-01-31 NOTE — HISTORY OF PRESENT ILLNESS
[FreeTextEntry1] : Mr. OLIVER is a 57 year old male with a history of obesity, post nasal drip, and SOB, presenting to the office today for a follow up pulmonary evaluation. His chief complaint is\par \par -he notes feeling generally well \par -he notes chronic constipation\par -he notes exercising (walking) without limitation\par -he notes his senses of smell and taste are stable \par -he notes losing weight \par -he notes diet is good \par -he notes sleeping for 7hrs\par -he notes good quality of sleep \par -he notes compliant with CPAP\par \par -he denies any headaches, nausea, emesis, fever, chills, sweats, chest pain, chest pressure, coughing, wheezing, palpitations, diarrhea, vertigo, dysphagia, heartburn, reflux, itchy eyes, itchy ears, leg swelling, leg pain, arthralgias, myalgias, or sour taste in the mouth.

## 2023-01-31 NOTE — ADDENDUM
[FreeTextEntry1] : Documented by MARLIN Lama acting as a scribe for Dr. Pancho Byrne on 01/31/2023 .\par \par All medical record entries made by the Scribe were at my, Dr. Pancho Byrne's, direction and personally dictated by me on 01/31/2023. I have reviewed the chart and agree that the record accurately reflects my personal performance of the history, physical exam, assessment and plan. I have also personally directed, reviewed, and agree with the discharge instructions.

## 2023-02-02 ENCOUNTER — APPOINTMENT (OUTPATIENT)
Dept: ORTHOPEDIC SURGERY | Facility: CLINIC | Age: 58
End: 2023-02-02
Payer: COMMERCIAL

## 2023-02-02 VITALS — BODY MASS INDEX: 38.09 KG/M2 | WEIGHT: 237 LBS | HEIGHT: 66 IN

## 2023-02-02 DIAGNOSIS — M67.441 GANGLION, RIGHT HAND: ICD-10-CM

## 2023-02-02 PROCEDURE — 99213 OFFICE O/P EST LOW 20 MIN: CPT

## 2023-02-02 NOTE — HISTORY OF PRESENT ILLNESS
[Gradual] : gradual [4] : 4 [2] : 2 [Dull/Aching] : dull/aching [Nothing helps with pain getting better] : Nothing helps with pain getting better [de-identified] : R SF cyst \par Occasional pain \par  [] : no [FreeTextEntry1] : Right SF  [FreeTextEntry3] : 2-3 years  [FreeTextEntry5] : he has pain and a bump on his right SF  [de-identified] : activity

## 2023-02-06 ENCOUNTER — TRANSCRIPTION ENCOUNTER (OUTPATIENT)
Age: 58
End: 2023-02-06

## 2023-02-28 RX ORDER — PEN NEEDLE, DIABETIC 29 G X1/2"
32G X 4 MM NEEDLE, DISPOSABLE MISCELLANEOUS
Qty: 1 | Refills: 3 | Status: ACTIVE | COMMUNITY
Start: 2021-06-24 | End: 1900-01-01

## 2023-02-28 RX ORDER — LIRAGLUTIDE 6 MG/ML
18 INJECTION SUBCUTANEOUS DAILY
Qty: 1 | Refills: 3 | Status: DISCONTINUED | COMMUNITY
Start: 2022-07-27 | End: 2023-02-28

## 2023-03-27 ENCOUNTER — APPOINTMENT (OUTPATIENT)
Dept: ENDOCRINOLOGY | Facility: CLINIC | Age: 58
End: 2023-03-27
Payer: COMMERCIAL

## 2023-03-27 VITALS
HEIGHT: 66 IN | SYSTOLIC BLOOD PRESSURE: 140 MMHG | OXYGEN SATURATION: 97 % | TEMPERATURE: 98.2 F | HEART RATE: 83 BPM | BODY MASS INDEX: 37.61 KG/M2 | WEIGHT: 234 LBS | DIASTOLIC BLOOD PRESSURE: 80 MMHG

## 2023-03-27 LAB — HBA1C MFR BLD HPLC: 8

## 2023-03-27 PROCEDURE — 95251 CONT GLUC MNTR ANALYSIS I&R: CPT

## 2023-03-27 PROCEDURE — 83036 HEMOGLOBIN GLYCOSYLATED A1C: CPT | Mod: QW

## 2023-03-27 PROCEDURE — 99214 OFFICE O/P EST MOD 30 MIN: CPT | Mod: 25

## 2023-03-27 NOTE — PHYSICAL EXAM
[Alert] : alert [Healthy Appearance] : healthy appearance [No Acute Distress] : no acute distress [EOMI] : extra ocular movement intact [Normal Hearing] : hearing was normal [Supple] : the neck was supple [Thyroid Not Enlarged] : the thyroid was not enlarged [No Respiratory Distress] : no respiratory distress [No Accessory Muscle Use] : no accessory muscle use [Normal Rate and Effort] : normal respiratory rate and effort [Clear to Auscultation] : lungs were clear to auscultation bilaterally [Normal S1, S2] : normal S1 and S2 [Normal Rate] : heart rate was normal [Regular Rhythm] : with a regular rhythm [Normal Bowel Sounds] : normal bowel sounds [Not Tender] : non-tender [Not Distended] : not distended [Soft] : abdomen soft [No Clubbing, Cyanosis] : no clubbing  or cyanosis of the fingernails [No Involuntary Movements] : no involuntary movements were seen [Right foot was examined, including] : right foot ~C was examined, including visual inspection with sensory and pulse exams [Left foot was examined, including] : left foot ~C was examined, including visual inspection with sensory and pulse exams [Normal] : normal [2+] : 2+ in the dorsalis pedis [Oriented x3] : oriented to person, place, and time [Normal Affect] : the affect was normal [Normal Mood] : the mood was normal [Kyphosis] : no kyphosis present [Acanthosis Nigricans] : no acanthosis nigricans [Acne] : no acne [Diminished Throughout Both Feet] : normal tactile sensation with monofilament testing throughout both feet [de-identified] : +trace LE edema b/l [FreeTextEntry6] : healed ulcer on right top 1st toe

## 2023-03-27 NOTE — HISTORY OF PRESENT ILLNESS
[FreeTextEntry1] : 58 y/o M w/ hx of Type 2 DM diagnosed in 2000 around the time of getting steroids for MS. Prior to 2000 was diagnosed with prediabetes. Started on insulin with steroids. Had been getting infusions every 6 months has been on hold for COVID. Infusions are premedicated with steroid so takes sliding scale of Humalog with meals and bedtime which he takes until his blood glucose comes down to normal. Last infusion 2/2023. Otherwise on metformin 1000 mg BID, Jardiance 25 mg daily, and Victoza 1.8 mg daily. Had COVID 9/2022 with hyperglycemia with some nonadherence to meds around that time. Now improved. Has been more adherent to low carb diet. Checks glucose >4x/day with Freestyle Bryan with average glucose 136. Reported hypoglycemia with symptoms of hypoglycemia when on Saxenda. Was told no longer covered. +polyuria with neurogenic bladder on Mybetriq, no polydipsia unless eats too much glucose. No nausea or vomiting. Had noticed some increased indigestion on GLP-1. Lost a few lbs since last visit.  \par Follows with podiatry  Dr. Hearn for routine diabetes foot care. No hx of neuropathy in feet. \par No hx of nephropathy\par No hx of retinopathy. Last retinal exam with Dr. Anne 2023.\par Had mechanical fall on college tour with son. Fractures right rib 2022. No other fractures.\par \par HTN on valsartan and amlodipine\par \par HLD on statin\par

## 2023-03-27 NOTE — DATA REVIEWED
[FreeTextEntry1] : Bryan 3/27/2023:  Reviewed and interpreted with values mostly at goal with occasional hyperglycemia occasionally after a meal. No hypoglycemia. At goal 61% of the time. Hyperglycemic 39%. No hypoglycemia. Highest values after breakfast and mostly dinner. \par \par Labs 3/27/2023:\par A1c 8%\par \par Bryan 11/23/2022:  Reviewed and interpreted with values mostly at goal with occasional hyperglycemia occasionally after a meal. No hypoglycemia. At goal 91% of the time. Hyperglycemic 9%. No hypoglycemia. Highest values after breakfast. \par \par Labs 11/23/2022:\par A1c 7.2%\par \par Bryan 8/10/2022:  Reviewed and interpreted with values mostly at goal with occasional hyperglycemia occasionally after a meal. No hypoglycemia. At goal 74% of the time. Hyperglycemic 26%. No hypoglycemia. Highest values 8/4-8/5 s/p Solu-Medrol. \par \par Labs 8/10/2022:\par A1c 6.8%\par \par Bryan 4/27/2022:  Reviewed and interpreted with values mostly at goal with occasional hyperglycemia occasionally after a meal. No hypoglycemia. At goal 80% of the time. Hyperglycemic 20%. No hypoglycemia. \par \par Labs 4/25//2022:\par A1c 6.8%\par CBC normal\par CMP normal except glucose of 133\par \par Bryan 1/19/2022:  Reviewed and interpreted with values mostly at goal with occasional hyperglycemia occasionally after a meal. No hypoglycemia. At goal 84% of the time. Hyperglycemic 16%. No hypoglycemia. \par \par Labs 1/19/2022:\par A1c 7.3%\par \par Bryan 9/15/2021:  Reviewed and interpreted with values mostly at goal with occasional hyperglycemia occasionally after a meal. No hypoglycemia. At goal 85% of the time. Hyperglycemic 15%. No hypoglycemia. \par \par Labs 9/15/2021:\par A1c 6.7%\par \par Bryan 6/15/2021:  Reviewed and interpreted with values mostly at goal with occasional hyperglycemia occasionally after a meal. No hypoglycemia. At goal 93% of the time. Hyperglycemic 7%\par \par Labs 6/15/2021:\par A1c 6.7%\par \par Bryan 5/3/2021: Reviewed and interpreted with values mostly at goal with occasional hyperglycemia usually early morning or after a meal.\par \par Labs 5/2021:\par Micro/Cr 28\par \par Labs 2/9/2021:\par A1c 7.9%\par CMP normal except glucose of 152\par Vit D 35.5

## 2023-03-27 NOTE — ASSESSMENT
[FreeTextEntry1] : 58 y/o M w/\par \par 1. Uncontrolled Type 2 DM w/ hyperglycemia- Discussed the importance of improved glycemic control to prevent long term complications from diabetes. Goal A1c <7% Had been at goal on current regimen when better adherent to diet. His regimen is optimal in terms of medications with low risk of hypoglycemia and additional weight loss, CV, and renal benefits. Currently on metformin 1000 mg BID, Victoza 1.8mg daily and Jardiance 25 mg daily. Recommended optimized dose of Saxenda to goal of 3 mg for additional weight loss and glycemic control benefits, but now not covered. Add Prandin 1mg with dinner given pattern of hyperglycemia after dinner. Monitor glucose via Freestyle Bryan. Optho follow-up up to date. 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 Check micro/cr 11/2023. \par \par 2. HTN- monitor BP on amlodipine and valsartan. BP above goal today, if persistently elevated can increase amlodipine to 10 mg daily.\par \par 3. HLD- c/w statin\par

## 2023-04-13 ENCOUNTER — NON-APPOINTMENT (OUTPATIENT)
Age: 58
End: 2023-04-13

## 2023-04-25 ENCOUNTER — APPOINTMENT (OUTPATIENT)
Dept: INTERNAL MEDICINE | Facility: CLINIC | Age: 58
End: 2023-04-25
Payer: COMMERCIAL

## 2023-04-25 ENCOUNTER — NON-APPOINTMENT (OUTPATIENT)
Age: 58
End: 2023-04-25

## 2023-04-25 VITALS
HEIGHT: 66 IN | OXYGEN SATURATION: 98 % | BODY MASS INDEX: 37.12 KG/M2 | WEIGHT: 231 LBS | TEMPERATURE: 98.3 F | HEART RATE: 82 BPM

## 2023-04-25 VITALS — SYSTOLIC BLOOD PRESSURE: 128 MMHG | DIASTOLIC BLOOD PRESSURE: 70 MMHG

## 2023-04-25 DIAGNOSIS — Z00.00 ENCOUNTER FOR GENERAL ADULT MEDICAL EXAMINATION W/OUT ABNORMAL FINDINGS: ICD-10-CM

## 2023-04-25 DIAGNOSIS — G35 MULTIPLE SCLEROSIS: ICD-10-CM

## 2023-04-25 PROCEDURE — 36415 COLL VENOUS BLD VENIPUNCTURE: CPT

## 2023-04-25 PROCEDURE — 93000 ELECTROCARDIOGRAM COMPLETE: CPT

## 2023-04-25 PROCEDURE — 99396 PREV VISIT EST AGE 40-64: CPT | Mod: 25

## 2023-04-25 NOTE — HISTORY OF PRESENT ILLNESS
[FreeTextEntry1] : cpx [de-identified] : cpx\par endo notes reviewed--recent labs\par meds reviewed\par seeing mental health\par  notes reviewed--on mybertique and rapaflow\par seeing neuro--Tawnya--MS stable\par UTD with optho

## 2023-05-04 ENCOUNTER — APPOINTMENT (OUTPATIENT)
Dept: CT IMAGING | Facility: CLINIC | Age: 58
End: 2023-05-04
Payer: COMMERCIAL

## 2023-05-04 ENCOUNTER — TRANSCRIPTION ENCOUNTER (OUTPATIENT)
Age: 58
End: 2023-05-04

## 2023-05-04 PROCEDURE — 75571 CT HRT W/O DYE W/CA TEST: CPT

## 2023-05-09 ENCOUNTER — TRANSCRIPTION ENCOUNTER (OUTPATIENT)
Age: 58
End: 2023-05-09

## 2023-05-10 ENCOUNTER — TRANSCRIPTION ENCOUNTER (OUTPATIENT)
Age: 58
End: 2023-05-10

## 2023-05-11 ENCOUNTER — TRANSCRIPTION ENCOUNTER (OUTPATIENT)
Age: 58
End: 2023-05-11

## 2023-05-16 ENCOUNTER — NON-APPOINTMENT (OUTPATIENT)
Age: 58
End: 2023-05-16

## 2023-05-17 ENCOUNTER — APPOINTMENT (OUTPATIENT)
Dept: ORTHOPEDIC SURGERY | Facility: CLINIC | Age: 58
End: 2023-05-17
Payer: COMMERCIAL

## 2023-05-17 VITALS
HEART RATE: 66 BPM | WEIGHT: 230 LBS | HEIGHT: 66 IN | DIASTOLIC BLOOD PRESSURE: 76 MMHG | BODY MASS INDEX: 36.96 KG/M2 | SYSTOLIC BLOOD PRESSURE: 129 MMHG

## 2023-05-17 DIAGNOSIS — M25.512 PAIN IN LEFT SHOULDER: ICD-10-CM

## 2023-05-17 PROCEDURE — 73030 X-RAY EXAM OF SHOULDER: CPT | Mod: LT

## 2023-05-17 PROCEDURE — 99213 OFFICE O/P EST LOW 20 MIN: CPT

## 2023-05-25 NOTE — PROGRESS NOTE ADULT - SUBJECTIVE AND OBJECTIVE BOX
Patient: Boogie Villa    Procedure: Procedure(s):  LEFT Brachial Basilic ARTERIOVENOUS FISTULA CREATION SURGERY WITH INTRAOPERATIVE ULTRASOUND .       Anesthesia Type:  General    Note:  Disposition: Outpatient   Postop Pain Control: Uneventful            Sign Out: Well controlled pain   PONV: No   Neuro/Psych: Uneventful            Sign Out: Acceptable/Baseline neuro status   Airway/Respiratory: Uneventful            Sign Out: Acceptable/Baseline resp. status   CV/Hemodynamics: Uneventful            Sign Out: Acceptable CV status; No obvious hypovolemia; No obvious fluid overload   Other NRE: NONE   DID A NON-ROUTINE EVENT OCCUR? No           Last vitals:  Vitals Value Taken Time   /71 05/25/23 1420   Temp 36.9  C (98.4  F) 05/25/23 1400   Pulse 75 05/25/23 1430   Resp     SpO2 91 % 05/25/23 1430   Vitals shown include unvalidated device data.    Electronically Signed By: Moises Moon MD  May 25, 2023  2:30 PM   POST OPERATIVE DAY #:  2  STATUS POST:  Right foot triple arthrodesis                    SUBJECTIVE: Patient seen and examined. Resting comfortably in bed with foot elevated on pillows.  Pain is well controlled during the day with Nucynta, but reports he requires the Dilaudid at night.     Pain (0-10): 4    OBJECTIVE:     Vital Signs Last 24 Hrs  T(C): 36.8 (09 May 2018 07:15), Max: 37.4 (08 May 2018 11:18)  T(F): 98.3 (09 May 2018 07:15), Max: 99.3 (08 May 2018 11:18)  HR: 58 (09 May 2018 07:15) (53 - 92)  BP: 135/75 (09 May 2018 07:15) (106/65 - 152/84)  RR: 16 (09 May 2018 07:15) (16 - 18)  SpO2: 95% (09 May 2018 07:15) (95% - 98%)    Affected extremity:          AO splint clean/dry/intact           Sensation:  intact to light touch           Motor exam:  Moving all toes on command          warm well perfused; capillary refill <3 seconds     LABS:                        11.7   11.2  )-----------( 151      ( 09 May 2018 06:58 )             34.4     05-09    139  |  105  |  19  ----------------------------<  148<H>  4.9   |  28  |  0.92    Ca    9.1      09 May 2018 06:58    Anticoagulation:  aspirin enteric coated 162 milliGRAM(s) Oral every 12 hours    Pain medications:   acetaminophen   Tablet. 1000 milliGRAM(s) Oral every 8 hours  HYDROmorphone  Injectable 0.5 milliGRAM(s) IV Push every 3 hours PRN  tapentadol 50 milliGRAM(s) Oral every 4 hours PRN      A/P : s/p Right foot triple arthrodesis  -    Pain control  -    DVT ppx: Aspirin  -    Weight bearing status: NWB Right LE  -    Physical Therapy  -    Dispo: Home today

## 2023-05-30 ENCOUNTER — NON-APPOINTMENT (OUTPATIENT)
Age: 58
End: 2023-05-30

## 2023-06-01 ENCOUNTER — APPOINTMENT (OUTPATIENT)
Dept: ORTHOPEDIC SURGERY | Facility: CLINIC | Age: 58
End: 2023-06-01
Payer: COMMERCIAL

## 2023-06-01 VITALS — WEIGHT: 230 LBS | HEIGHT: 66 IN | BODY MASS INDEX: 36.96 KG/M2

## 2023-06-01 PROCEDURE — 99214 OFFICE O/P EST MOD 30 MIN: CPT | Mod: 57

## 2023-06-01 NOTE — HISTORY OF PRESENT ILLNESS
[Sudden] : sudden [5] : 5 [4] : 4 [Dull/Aching] : dull/aching [Ice] : ice [de-identified] : He fell on L hand yesterday \par \par  [] : no [FreeTextEntry1] : L hand [FreeTextEntry3] : 5/31/23 [FreeTextEntry5] : he tripped and fell while walking his dog. he went to [id number],splinted [de-identified] : activity

## 2023-06-01 NOTE — ASSESSMENT
[FreeTextEntry1] : For L SF prox phalanx CRPP\par R/B/A of surgery discussed with the patient. Risks including but not limited to infection, nerve damage, tendon damage, pain, stiffness, recurrence, no resolution of symptoms, arthritis, malunion, nonunion, loss of function, limb or life. They understand and agree to surgery \par Return post op \par

## 2023-06-01 NOTE — PHYSICAL EXAM
[de-identified] : L hand\par Swelling\par Tender SF and RF\par Stiffness\par \par Xrays displaced 5th Prox phalanx fx; nondisplaced RF prox phalanx

## 2023-06-02 ENCOUNTER — TRANSCRIPTION ENCOUNTER (OUTPATIENT)
Age: 58
End: 2023-06-02

## 2023-06-06 ENCOUNTER — TRANSCRIPTION ENCOUNTER (OUTPATIENT)
Age: 58
End: 2023-06-06

## 2023-06-07 ENCOUNTER — APPOINTMENT (OUTPATIENT)
Age: 58
End: 2023-06-07
Payer: COMMERCIAL

## 2023-06-07 PROCEDURE — 26727 TREAT FINGER FRACTURE EACH: CPT | Mod: F4

## 2023-06-07 PROCEDURE — 26727 TREAT FINGER FRACTURE EACH: CPT | Mod: AS,F4

## 2023-06-15 ENCOUNTER — APPOINTMENT (OUTPATIENT)
Dept: ORTHOPEDIC SURGERY | Facility: CLINIC | Age: 58
End: 2023-06-15
Payer: COMMERCIAL

## 2023-06-15 VITALS — HEIGHT: 66 IN | WEIGHT: 230 LBS | BODY MASS INDEX: 36.96 KG/M2

## 2023-06-15 PROCEDURE — 99024 POSTOP FOLLOW-UP VISIT: CPT

## 2023-06-15 PROCEDURE — 73140 X-RAY EXAM OF FINGER(S): CPT | Mod: LT

## 2023-06-15 NOTE — PHYSICAL EXAM
[de-identified] : L hand \par Pins in place \par No infection\par Tender\par Stiffness\par \par Xrays aligned SF RF  prox phalanx fractures

## 2023-06-15 NOTE — HISTORY OF PRESENT ILLNESS
[1] : 2 [Dull/Aching] : dull/aching [] : Post Surgical Visit: yes [de-identified] : L SF prox phalanx CRPP\par 1 week\par he is doing well [de-identified] : 6/7/23 [de-identified] : L SF prox phalanx CRPP

## 2023-06-28 ENCOUNTER — APPOINTMENT (OUTPATIENT)
Dept: ENDOCRINOLOGY | Facility: CLINIC | Age: 58
End: 2023-06-28
Payer: COMMERCIAL

## 2023-06-28 VITALS
HEART RATE: 77 BPM | TEMPERATURE: 98.3 F | BODY MASS INDEX: 37.12 KG/M2 | DIASTOLIC BLOOD PRESSURE: 82 MMHG | OXYGEN SATURATION: 97 % | HEIGHT: 66 IN | SYSTOLIC BLOOD PRESSURE: 128 MMHG | WEIGHT: 231 LBS

## 2023-06-28 LAB — HBA1C MFR BLD HPLC: 7.9

## 2023-06-28 PROCEDURE — 95251 CONT GLUC MNTR ANALYSIS I&R: CPT

## 2023-06-28 PROCEDURE — 99214 OFFICE O/P EST MOD 30 MIN: CPT | Mod: 25

## 2023-06-28 RX ORDER — REPAGLINIDE 1 MG/1
1 TABLET ORAL
Qty: 90 | Refills: 3 | Status: ACTIVE | COMMUNITY
Start: 2023-03-27 | End: 1900-01-01

## 2023-06-28 RX ORDER — LIRAGLUTIDE 6 MG/ML
18 INJECTION SUBCUTANEOUS
Qty: 27 | Refills: 3 | Status: COMPLETED | COMMUNITY
Start: 2020-02-03 | End: 2023-06-28

## 2023-06-28 NOTE — ASSESSMENT
[FreeTextEntry1] : 58 y/o M w/\par \par 1. Uncontrolled Type 2 DM w/ hyperglycemia- Discussed the importance of improved glycemic control to prevent long term complications from diabetes. Goal A1c <7% Had been at goal on current regimen when better adherent to diet. His regimen is optimal in terms of medications with low risk of hypoglycemia and additional weight loss, CV, and renal benefits. Currently on metformin 1000 mg BID, Victoza 1.8mg daily and Jardiance 25 mg daily. Recommended optimized dose of Saxenda to goal of 3 mg for additional weight loss and glycemic control benefits, but now not covered. Can try switching to Mounjaro weekly. Added Prandin 1mg with dinner given pattern of hyperglycemia after dinner. Can take with breakfast and lunch as week for high carb meals only. Monitor glucose via Freestyle Bryan. Optho follow-up up to date. 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 Check micro/cr 11/2023. \par \par 2. HTN- monitor BP on amlodipine and valsartan. BP at goal today, if persistently elevated can increase amlodipine to 10 mg daily.\par \par 3. HLD- c/w statin\par

## 2023-06-28 NOTE — PHYSICAL EXAM
[Alert] : alert [Healthy Appearance] : healthy appearance [No Acute Distress] : no acute distress [EOMI] : extra ocular movement intact [Normal Hearing] : hearing was normal [Supple] : the neck was supple [Thyroid Not Enlarged] : the thyroid was not enlarged [No Respiratory Distress] : no respiratory distress [No Accessory Muscle Use] : no accessory muscle use [Normal Rate and Effort] : normal respiratory rate and effort [Clear to Auscultation] : lungs were clear to auscultation bilaterally [Normal S1, S2] : normal S1 and S2 [Normal Rate] : heart rate was normal [Regular Rhythm] : with a regular rhythm [Normal Bowel Sounds] : normal bowel sounds [Not Tender] : non-tender [Not Distended] : not distended [Soft] : abdomen soft [No Clubbing, Cyanosis] : no clubbing  or cyanosis of the fingernails [No Involuntary Movements] : no involuntary movements were seen [Right foot was examined, including] : right foot ~C was examined, including visual inspection with sensory and pulse exams [Left foot was examined, including] : left foot ~C was examined, including visual inspection with sensory and pulse exams [Normal] : normal [2+] : 2+ in the dorsalis pedis [Oriented x3] : oriented to person, place, and time [Normal Affect] : the affect was normal [Normal Mood] : the mood was normal [Kyphosis] : no kyphosis present [Acanthosis Nigricans] : no acanthosis nigricans [Acne] : no acne [Diminished Throughout Both Feet] : normal tactile sensation with monofilament testing throughout both feet [de-identified] : +hand cast on left hand [de-identified] : +trace LE edema b/l [FreeTextEntry6] : healed ulcer on right top 1st toe

## 2023-06-28 NOTE — DATA REVIEWED
[FreeTextEntry1] : Bryan 6/28/2023:  Reviewed and interpreted with values mostly at goal with occasional hyperglycemia occasionally after a meal. No hypoglycemia. At goal 67% of the time. Hyperglycemic 33%. No hypoglycemia. Highest values occasionally after breakfast and dinner. \par \par Labs 6/28/2023:\par A1c 7.9%\par \par Labs 6/2/2023:\par Glucose 196\par A1c 8%\par CBC normal\par CMP otherwise normal\par \par Bryan 3/27/2023:  Reviewed and interpreted with values mostly at goal with occasional hyperglycemia occasionally after a meal. No hypoglycemia. At goal 61% of the time. Hyperglycemic 39%. No hypoglycemia. Highest values after breakfast and mostly dinner. \par \par Labs 3/27/2023:\par A1c 8%\par \par Bryan 11/23/2022:  Reviewed and interpreted with values mostly at goal with occasional hyperglycemia occasionally after a meal. No hypoglycemia. At goal 91% of the time. Hyperglycemic 9%. No hypoglycemia. Highest values after breakfast. \par \par Labs 11/23/2022:\par A1c 7.2%\par \par Bryan 8/10/2022:  Reviewed and interpreted with values mostly at goal with occasional hyperglycemia occasionally after a meal. No hypoglycemia. At goal 74% of the time. Hyperglycemic 26%. No hypoglycemia. Highest values 8/4-8/5 s/p Solu-Medrol. \par \par Labs 8/10/2022:\par A1c 6.8%\par \par Bryan 4/27/2022:  Reviewed and interpreted with values mostly at goal with occasional hyperglycemia occasionally after a meal. No hypoglycemia. At goal 80% of the time. Hyperglycemic 20%. No hypoglycemia. \par \par Labs 4/25//2022:\par A1c 6.8%\par CBC normal\par CMP normal except glucose of 133\par \par Bryan 1/19/2022:  Reviewed and interpreted with values mostly at goal with occasional hyperglycemia occasionally after a meal. No hypoglycemia. At goal 84% of the time. Hyperglycemic 16%. No hypoglycemia. \par \par Labs 1/19/2022:\par A1c 7.3%\par \par Bryan 9/15/2021:  Reviewed and interpreted with values mostly at goal with occasional hyperglycemia occasionally after a meal. No hypoglycemia. At goal 85% of the time. Hyperglycemic 15%. No hypoglycemia. \par \par Labs 9/15/2021:\par A1c 6.7%\par \par Bryan 6/15/2021:  Reviewed and interpreted with values mostly at goal with occasional hyperglycemia occasionally after a meal. No hypoglycemia. At goal 93% of the time. Hyperglycemic 7%\par \par Labs 6/15/2021:\par A1c 6.7%\par \par Bryan 5/3/2021: Reviewed and interpreted with values mostly at goal with occasional hyperglycemia usually early morning or after a meal.\par \par Labs 5/2021:\par Micro/Cr 28\par \par Labs 2/9/2021:\par A1c 7.9%\par CMP normal except glucose of 152\par Vit D 35.5

## 2023-06-28 NOTE — HISTORY OF PRESENT ILLNESS
[FreeTextEntry1] : 56 y/o M w/ hx of Type 2 DM diagnosed in 2000 around the time of getting steroids for MS. Prior to 2000 was diagnosed with prediabetes. Started on insulin with steroids. Had been getting infusions every 6 months which were on hold for COVID. Infusions are premedicated with steroid so takes sliding scale of Humalog with meals and bedtime which he takes until his blood glucose comes down to normal. Last infusion 2/2023. Otherwise on metformin 1000 mg BID, Jardiance 25 mg daily, and Victoza 1.8 mg daily. Added Prandin 1mg with dinner given pattern of hyperglycemia after dinner 3/2023. Had COVID 9/2022 with hyperglycemia with some nonadherence to meds around that time. Now improved. Has been more adherent to low carb diet. Checks glucose >4x/day with Freestyle Bryan with average glucose 136. Reported hypoglycemia with symptoms of hypoglycemia when on Saxenda. Was told no longer covered. +polyuria with neurogenic bladder on Mybetriq, no polydipsia unless eats too much glucose. No nausea or vomiting. Had noticed some increased indigestion on GLP-1. Weight stable. \par Follows with podiatry  Dr. Hearn for routine diabetes foot care. No hx of neuropathy in feet. \par No hx of nephropathy\par No hx of retinopathy. Last retinal exam with Dr. Anne 2023.\par Had mechanical fall on college tour with son. Fractures right rib 2022. Fell while walking the dog, fractured left 5th digit. No other fractures.\par \par HTN on valsartan and amlodipine\par \par HLD on statin\par

## 2023-06-29 ENCOUNTER — APPOINTMENT (OUTPATIENT)
Dept: ORTHOPEDIC SURGERY | Facility: CLINIC | Age: 58
End: 2023-06-29
Payer: COMMERCIAL

## 2023-06-29 VITALS — BODY MASS INDEX: 37.12 KG/M2 | HEIGHT: 66 IN | WEIGHT: 231 LBS

## 2023-06-29 PROCEDURE — 99024 POSTOP FOLLOW-UP VISIT: CPT

## 2023-06-29 PROCEDURE — 73140 X-RAY EXAM OF FINGER(S): CPT | Mod: LT

## 2023-06-29 NOTE — ASSESSMENT
[FreeTextEntry1] : Pins rmoeved under aseptic codnitions\par He tolertaed i well\par Therpay \par Retur laura 3 weeks- xryas

## 2023-06-29 NOTE — HISTORY OF PRESENT ILLNESS
[3] : 3 [1] : 2 [Dull/Aching] : dull/aching [] : Post Surgical Visit: yes [de-identified] : L SF CRPP\par 3 weeks\par Doing well [FreeTextEntry1] : L SF  [de-identified] : 6/7/23 [de-identified] : L SF prox phalanx [facility]

## 2023-06-29 NOTE — PHYSICAL EXAM
[de-identified] : L SF\par No infection\par Puins in place\par Nontedner fx\par \par Xrays Fracture healed\par Pins in place

## 2023-07-25 ENCOUNTER — APPOINTMENT (OUTPATIENT)
Dept: PULMONOLOGY | Facility: CLINIC | Age: 58
End: 2023-07-25
Payer: COMMERCIAL

## 2023-07-25 VITALS
HEART RATE: 80 BPM | DIASTOLIC BLOOD PRESSURE: 78 MMHG | OXYGEN SATURATION: 97 % | WEIGHT: 226.5 LBS | BODY MASS INDEX: 36.4 KG/M2 | HEIGHT: 66 IN | TEMPERATURE: 97.1 F | RESPIRATION RATE: 16 BRPM | SYSTOLIC BLOOD PRESSURE: 136 MMHG

## 2023-07-25 PROCEDURE — 94729 DIFFUSING CAPACITY: CPT

## 2023-07-25 PROCEDURE — 94727 GAS DIL/WSHOT DETER LNG VOL: CPT

## 2023-07-25 PROCEDURE — 94010 BREATHING CAPACITY TEST: CPT

## 2023-07-25 PROCEDURE — ZZZZZ: CPT

## 2023-07-25 PROCEDURE — 99214 OFFICE O/P EST MOD 30 MIN: CPT | Mod: 25

## 2023-07-25 NOTE — ADDENDUM
[FreeTextEntry1] : Documented by Yony Valadez acting as a scribe for Dr. Pancho Byrne on 07/25/2023 .\par \par All medical record entries made by the Scribe were at my, Dr. Pancho Byrne's, direction and personally dictated by me on 07/25/2023 . I have reviewed the chart and agree that the record accurately reflects my personal performance of the history, physical exam, assessment and plan. I have also personally directed, reviewed, and agree with the discharge instructions.

## 2023-07-25 NOTE — ASSESSMENT
[FreeTextEntry1] : Mr. OLIVER is a 57 year old male with a history of HLD, RADS, HTN, DM, MS, JERRELL, and allergies who comes into the office today for a follow up pulmonary evaluation - His number one issue is still weight. (improving) - Balance / MS\par \par The patient's shortness of breath is multifactorial due to:\par -pulmonary disease \par      -has risk factors for asthma (allergy)\par -poor breathing mechanics \par -overweight/out of shape\par -CAD  \par \par Problem 1: Allergy / Sinus - quiet \par -Continue using Zyrtec 5 mg QHS \par -Environmental measures for allergies were encouraged including mattress and pillow cover, air purifier, and environmental controls. \par \par Problem 1A: RADs / Asthma\par - off Rx\par \par Problem 2: JERRELL (compliant) \par -s/p split night sleep study for memory concentration, nocturia, daily fatigue, metabolism.\par -Resmed Fx wide full nasal mask (medium), with 8 cm pressure ([facility])\par -Sleep apnea is associated with adverse clinical consequences which an affect most organ systems. Cardiovascular disease risk includes arrhythmias, atrial fibrillation, hypertension, coronary artery disease, and stroke. Metabolic disorders include diabetes type 2, non-alcoholic fatty liver disease. Mood disorder especially depression; and cognitive decline especially in the elderly. Associations with chronic reflux/Morris’s esophagus some but not all inclusive. \par -Reasons include arousal consistent with hypopnea; respiratory events most prominent in REM sleep or supine position; therefore sleep staging and body position are important for accurate diagnosis and estimation of AHI. \par \par Problem 3: Cardiac\par -Recommended to follow up with cardiologist if needed\par \par Problem 4: Poor Mechanics of Breathing\par -Recommended Wim Hof and Buteyko breathing techniques \par - Proper breathing techniques were reviewed with an emphasis of exhalation. Patient instructed to breath in for 1 second and out for four seconds. Patient was encouraged to not talk while walking. \par \par Problem 5: overweight/out of shape\par -Recommend "Muniq" OTC Supplement for weight loss, energy levels, and blood sugar levels \par - Weight loss, exercise and diet control were discussed and are highly encouraged. Treatment options were given such as aqua therapy, and contacting a nutritionist. Recommended to use the elliptical, stationary bike, less use of treadmill. Mindful eating was explained to the patient. Obesity is associated with worsening asthma, SOB, and potential for cardiac disease, diabetes, and other underlying medical conditions.\par \par Problem 6: Health Maintenance/COVID19 Precautions:\par -recommended SaNOtize nasal spray \par -s/p Pfizer COVID 19 vaccine x4\par - Clean your hands often. Wash your hands often with soap and water for at least 20 seconds, especially after blowing your nose, coughing, or sneezing, or having been in a public place.\par - If soap and water are not available, use a hand  that contains at least 60% alcohol.\par - To the extent possible, avoid touching high-touch surfaces in public places - elevator buttons, door handles, handrails, handshaking with people, etc. Use a tissue or your sleeve to cover your hand or finger if you must touch something.\par - Wash your hands after touching surfaces in public places.\par - Avoid touching your face, nose, eyes, etc.\par - Clean and disinfect your home to remove germs: practice routine cleaning of frequently touched surfaces (for example: tables, doorknobs, light switches, handles, desks, toilets, faucets, sinks & cell phones)\par - Avoid crowds, especially in poorly ventilated spaces. Your risk of exposure to respiratory viruses like COVID-19 may increase in crowded, closed-in settings with little air circulation if there are people in the crowd who are sick. All patients are recommended to practice social distancing and stay at least 6 feet away from others.\par - Avoid all non-essential travel including plane trips, and especially avoid embarking on cruise ships.\par -If COVID-19 is spreading in your community, take extra measures to put distance between yourself and other people to further reduce your risk of being exposed to this new virus.\par -Stay home as much as possible.\par - Consider ways of getting food brought to your house through family, social, or commercial networks\par -Be aware that the virus has been known to live in the air up to 3 hours post exposure, cardboard up to 24 hours post exposure, copper up to 4 hours post exposure, steel and plastic up to 2-3 days post exposure. Risk of transmission from these surfaces are affected by many variables.\par Immune Support Recommendations:\par -OTC Vitamin C 500mg BID \par -OTC Quercetin 250-500mg BID \par -OTC Zinc 75-100mg per day \par -OTC Melatonin 1 or 2 mg a night \par -OTC Vitamin D 1-4000mg per day \par -OTC Tonic Water 8oz per day\par Asthma and COVID19:\par You need to make sure your asthma is under control. This often requires the use of inhaled corticosteroids (and sometimes oral corticosteroids). Inhaled corticosteroids do not likely reduce your immune system’s ability to fight infections, but oral corticosteroids may. It is important to use the steps above to protect yourself to limit your exposure to any respiratory virus.\par \par Problem 7: health maintenance\par -s/p influenza vaccine - 2022\par -recommended strep pneumonia vaccines after age 65: Prevnar-13 vaccine, followed by Pneumo vaccine 23 one year following\par -recommended early intervention for URIs\par -recommended regular osteoporosis evaluations\par -recommended early dermatological evaluations\par -recommended after the age of 50 to the age of 70, colonoscopy every 5 years \par \par \par  Follow up in 4 months - SPI\par -he  is recommended to call with any changes, questions, or concerns.

## 2023-07-25 NOTE — PROCEDURE
[FreeTextEntry1] : Full PFT reveals normal flows; FEV1 was 3.93 L which is 134% of predicted; normal lung volumes; normal diffusion at 31.8, which is 124% of predicted; normal flow volume loop. PFTs were performed to evaluate for Asthma\par \par FENO Unable

## 2023-07-25 NOTE — HISTORY OF PRESENT ILLNESS
[FreeTextEntry1] : Mr. OLIVER is a 57 year old male with a history of obesity, post nasal drip, and SOB, presenting to the office today for a follow up pulmonary evaluation. His chief complaint is\par \par - he notes his energy levels are at a 6/10 \par - he notes constipation consistent with his medication\par - he notes a persistent bitter taste in the mouth \par - vision is stable\par - he notes his sleep is okay \par - he notes getting 7 hours of sleep on a good day but its not always that good\par - he notes waling multiple times a day for exercise\par - he notes having many joint and muscle pains in his ankles and hands\par - he notes falling and breaking multiple fingers - in PT to regain dexterity \par - he notes his balance therapy needs some focus on his attentiveness c/w MS \par - he notes losing some weight \par - he notes his allergy sx are controlled with medication \par \par -he denies any headaches, nausea, emesis, fever, chills, sweats, chest pain, chest pressure, coughing, wheezing, palpitations, constipation, diarrhea, vertigo, dysphagia, heartburn, reflux, itchy eyes, itchy ears, leg swelling, leg pain, or sour taste in the mouth.

## 2023-07-27 ENCOUNTER — APPOINTMENT (OUTPATIENT)
Dept: ORTHOPEDIC SURGERY | Facility: CLINIC | Age: 58
End: 2023-07-27
Payer: COMMERCIAL

## 2023-07-27 VITALS — WEIGHT: 226 LBS | HEIGHT: 66 IN | BODY MASS INDEX: 36.32 KG/M2

## 2023-07-27 DIAGNOSIS — S62.645A NONDISPLACED FRACTURE OF PROXIMAL PHALANX OF LEFT RING FINGER, INITIAL ENCOUNTER FOR CLOSED FRACTURE: ICD-10-CM

## 2023-07-27 DIAGNOSIS — S62.617A DISPLACED FRACTURE OF PROXIMAL PHALANX OF LEFT LITTLE FINGER, INITIAL ENCOUNTER FOR CLOSED FRACTURE: ICD-10-CM

## 2023-07-27 PROCEDURE — 99024 POSTOP FOLLOW-UP VISIT: CPT

## 2023-07-27 PROCEDURE — 73140 X-RAY EXAM OF FINGER(S): CPT | Mod: LT

## 2023-07-27 NOTE — PHYSICAL EXAM
[de-identified] : L SF\par Improved ROM\par Almost full \par Nontender\par Min swelling\par \par Xrays well aligned, healed fracture

## 2023-07-27 NOTE — HISTORY OF PRESENT ILLNESS
[2] : 2 [1] : 2 [Dull/Aching] : dull/aching [] : Post Surgical Visit: yes [de-identified] : L SF CRPP 7 weeks ago\par He is doing well with therapy  [FreeTextEntry1] : L SF [FreeTextEntry5] : pain is better  [de-identified] : 6/7/2023 [de-identified] : L SF prox phalanx CR[facility]

## 2023-08-14 ENCOUNTER — TRANSCRIPTION ENCOUNTER (OUTPATIENT)
Age: 58
End: 2023-08-14

## 2023-08-15 ENCOUNTER — TRANSCRIPTION ENCOUNTER (OUTPATIENT)
Age: 58
End: 2023-08-15

## 2023-08-15 RX ORDER — INSULIN ASPART 100 [IU]/ML
100 INJECTION, SOLUTION INTRAVENOUS; SUBCUTANEOUS
Qty: 1 | Refills: 5 | Status: ACTIVE | COMMUNITY
Start: 2021-05-03 | End: 1900-01-01

## 2023-08-24 ENCOUNTER — TRANSCRIPTION ENCOUNTER (OUTPATIENT)
Age: 58
End: 2023-08-24

## 2023-09-15 ENCOUNTER — TRANSCRIPTION ENCOUNTER (OUTPATIENT)
Age: 58
End: 2023-09-15

## 2023-09-25 ENCOUNTER — APPOINTMENT (OUTPATIENT)
Dept: INTERNAL MEDICINE | Facility: CLINIC | Age: 58
End: 2023-09-25
Payer: COMMERCIAL

## 2023-09-25 DIAGNOSIS — U07.1 COVID-19: ICD-10-CM

## 2023-09-25 PROCEDURE — 99214 OFFICE O/P EST MOD 30 MIN: CPT | Mod: 95

## 2023-09-25 RX ORDER — AMOXICILLIN 500 MG/1
500 TABLET, FILM COATED ORAL
Qty: 4 | Refills: 0 | Status: COMPLETED | COMMUNITY
Start: 2023-01-30 | End: 2023-09-25

## 2023-09-25 RX ORDER — OXYCODONE AND ACETAMINOPHEN 5; 325 MG/1; MG/1
5-325 TABLET ORAL EVERY 4 HOURS
Qty: 20 | Refills: 0 | Status: COMPLETED | COMMUNITY
Start: 2023-06-05 | End: 2023-09-25

## 2023-09-25 RX ORDER — FLASH GLUCOSE SENSOR
KIT MISCELLANEOUS
Qty: 6 | Refills: 3 | Status: COMPLETED | COMMUNITY
Start: 2021-02-11 | End: 2023-09-25

## 2023-09-25 RX ORDER — RITUXIMAB 10 MG/ML
INJECTION, SOLUTION INTRAVENOUS
Refills: 0 | Status: COMPLETED | COMMUNITY
End: 2023-09-25

## 2023-09-25 RX ORDER — AMOXICILLIN 500 MG/1
500 TABLET, FILM COATED ORAL
Qty: 4 | Refills: 0 | Status: COMPLETED | COMMUNITY
Start: 2023-01-17 | End: 2023-09-25

## 2023-10-18 ENCOUNTER — APPOINTMENT (OUTPATIENT)
Dept: ENDOCRINOLOGY | Facility: CLINIC | Age: 58
End: 2023-10-18
Payer: COMMERCIAL

## 2023-10-18 VITALS
HEIGHT: 66 IN | HEART RATE: 67 BPM | WEIGHT: 225 LBS | BODY MASS INDEX: 36.16 KG/M2 | OXYGEN SATURATION: 94 % | TEMPERATURE: 98.7 F | SYSTOLIC BLOOD PRESSURE: 138 MMHG | DIASTOLIC BLOOD PRESSURE: 78 MMHG

## 2023-10-18 DIAGNOSIS — I10 ESSENTIAL (PRIMARY) HYPERTENSION: ICD-10-CM

## 2023-10-18 LAB — HBA1C MFR BLD HPLC: 7.3

## 2023-10-18 PROCEDURE — 99214 OFFICE O/P EST MOD 30 MIN: CPT | Mod: 25

## 2023-10-18 PROCEDURE — 95251 CONT GLUC MNTR ANALYSIS I&R: CPT

## 2023-10-18 PROCEDURE — 83036 HEMOGLOBIN GLYCOSYLATED A1C: CPT | Mod: QW

## 2023-10-20 LAB
ALBUMIN SERPL ELPH-MCNC: 4.4 G/DL
ALP BLD-CCNC: 91 U/L
ALT SERPL-CCNC: 60 U/L
ANION GAP SERPL CALC-SCNC: 13 MMOL/L
AST SERPL-CCNC: 40 U/L
BASOPHILS # BLD AUTO: 0.03 K/UL
BASOPHILS NFR BLD AUTO: 0.5 %
BILIRUB SERPL-MCNC: 0.3 MG/DL
BUN SERPL-MCNC: 19 MG/DL
CALCIUM SERPL-MCNC: 9.9 MG/DL
CHLORIDE SERPL-SCNC: 105 MMOL/L
CHOLEST SERPL-MCNC: 182 MG/DL
CO2 SERPL-SCNC: 25 MMOL/L
CREAT SERPL-MCNC: 0.85 MG/DL
CREAT SPEC-SCNC: 75 MG/DL
EGFR: 100 ML/MIN/1.73M2
EOSINOPHIL # BLD AUTO: 0.14 K/UL
EOSINOPHIL NFR BLD AUTO: 2.2 %
GLUCOSE SERPL-MCNC: 115 MG/DL
HCT VFR BLD CALC: 44.3 %
HDLC SERPL-MCNC: 42 MG/DL
HGB BLD-MCNC: 13.9 G/DL
IMM GRANULOCYTES NFR BLD AUTO: 1.7 %
LDLC SERPL CALC-MCNC: 113 MG/DL
LYMPHOCYTES # BLD AUTO: 2.3 K/UL
LYMPHOCYTES NFR BLD AUTO: 36.5 %
MAN DIFF?: NORMAL
MCHC RBC-ENTMCNC: 28.1 PG
MCHC RBC-ENTMCNC: 31.4 GM/DL
MCV RBC AUTO: 89.7 FL
MICROALBUMIN 24H UR DL<=1MG/L-MCNC: 1.5 MG/DL
MICROALBUMIN/CREAT 24H UR-RTO: 19 MG/G
MONOCYTES # BLD AUTO: 0.46 K/UL
MONOCYTES NFR BLD AUTO: 7.3 %
NEUTROPHILS # BLD AUTO: 3.26 K/UL
NEUTROPHILS NFR BLD AUTO: 51.8 %
NONHDLC SERPL-MCNC: 139 MG/DL
PLATELET # BLD AUTO: 308 K/UL
POTASSIUM SERPL-SCNC: 5.1 MMOL/L
PROT SERPL-MCNC: 6.9 G/DL
RBC # BLD: 4.94 M/UL
RBC # FLD: 15.1 %
SODIUM SERPL-SCNC: 143 MMOL/L
T4 FREE SERPL-MCNC: 1.2 NG/DL
TRIGL SERPL-MCNC: 148 MG/DL
TSH SERPL-ACNC: 2.36 UIU/ML
WBC # FLD AUTO: 6.3 K/UL

## 2023-10-23 ENCOUNTER — APPOINTMENT (OUTPATIENT)
Dept: INTERNAL MEDICINE | Facility: CLINIC | Age: 58
End: 2023-10-23
Payer: COMMERCIAL

## 2023-10-23 VITALS
HEIGHT: 66 IN | TEMPERATURE: 97.9 F | OXYGEN SATURATION: 97 % | HEART RATE: 67 BPM | SYSTOLIC BLOOD PRESSURE: 138 MMHG | WEIGHT: 224.5 LBS | DIASTOLIC BLOOD PRESSURE: 86 MMHG | BODY MASS INDEX: 36.08 KG/M2

## 2023-10-23 DIAGNOSIS — H60.543 ACUTE ECZEMATOID OTITIS EXTERNA, BILATERAL: ICD-10-CM

## 2023-10-23 PROCEDURE — 99213 OFFICE O/P EST LOW 20 MIN: CPT

## 2023-10-23 RX ORDER — HYDROCORTISONE 25 MG/G
2.5 CREAM TOPICAL
Qty: 1 | Refills: 0 | Status: ACTIVE | COMMUNITY
Start: 2023-10-23 | End: 1900-01-01

## 2023-11-29 ENCOUNTER — TRANSCRIPTION ENCOUNTER (OUTPATIENT)
Age: 58
End: 2023-11-29

## 2023-12-14 ENCOUNTER — TRANSCRIPTION ENCOUNTER (OUTPATIENT)
Age: 58
End: 2023-12-14

## 2023-12-14 RX ORDER — ATORVASTATIN CALCIUM 10 MG/1
10 TABLET, FILM COATED ORAL
Qty: 90 | Refills: 3 | Status: ACTIVE | COMMUNITY
Start: 2019-04-27 | End: 1900-01-01

## 2024-01-23 ENCOUNTER — APPOINTMENT (OUTPATIENT)
Dept: PULMONOLOGY | Facility: CLINIC | Age: 59
End: 2024-01-23
Payer: COMMERCIAL

## 2024-01-23 VITALS
HEART RATE: 65 BPM | RESPIRATION RATE: 16 BRPM | OXYGEN SATURATION: 99 % | BODY MASS INDEX: 36 KG/M2 | WEIGHT: 224 LBS | SYSTOLIC BLOOD PRESSURE: 120 MMHG | DIASTOLIC BLOOD PRESSURE: 74 MMHG | HEIGHT: 66 IN | TEMPERATURE: 97 F

## 2024-01-23 DIAGNOSIS — G47.33 OBSTRUCTIVE SLEEP APNEA (ADULT) (PEDIATRIC): ICD-10-CM

## 2024-01-23 DIAGNOSIS — R09.82 POSTNASAL DRIP: ICD-10-CM

## 2024-01-23 DIAGNOSIS — J45.909 UNSPECIFIED ASTHMA, UNCOMPLICATED: ICD-10-CM

## 2024-01-23 DIAGNOSIS — K76.0 FATTY (CHANGE OF) LIVER, NOT ELSEWHERE CLASSIFIED: ICD-10-CM

## 2024-01-23 DIAGNOSIS — E66.9 OBESITY, UNSPECIFIED: ICD-10-CM

## 2024-01-23 DIAGNOSIS — R06.02 SHORTNESS OF BREATH: ICD-10-CM

## 2024-01-23 DIAGNOSIS — U07.1 COVID-19: ICD-10-CM

## 2024-01-23 DIAGNOSIS — K21.9 GASTRO-ESOPHAGEAL REFLUX DISEASE W/OUT ESOPHAGITIS: ICD-10-CM

## 2024-01-23 PROCEDURE — 99214 OFFICE O/P EST MOD 30 MIN: CPT | Mod: 25

## 2024-01-23 PROCEDURE — 94010 BREATHING CAPACITY TEST: CPT

## 2024-01-23 PROCEDURE — 95012 NITRIC OXIDE EXP GAS DETER: CPT

## 2024-01-23 NOTE — ASSESSMENT
[FreeTextEntry1] : Mr. OLIVER is a 58 year old male with a history of HLD, RADS, HTN, DM, MS, JERRELL, and allergies who comes into the office today for a follow up pulmonary evaluation - His number one issue is still weight. (improving) - Balance / MS  The patient's shortness of breath is multifactorial due to: -pulmonary disease  -has risk factors for asthma (allergy) -poor breathing mechanics -overweight/out of shape -CAD  Problem 1: Allergy / Sinus - quiet -Continue using Zyrtec 5 mg QHS -Environmental measures for allergies were encouraged including mattress and pillow cover, air purifier, and environmental controls.  Problem 1A: RADs / Asthma - off Rx  Problem 2: JERRELL (compliant) -s/p split night sleep study for memory concentration, nocturia, daily fatigue, metabolism. -Resmed Fx wide full nasal mask (medium), with 8 cm pressure ([facility]) -Sleep apnea is associated with adverse clinical consequences which an affect most organ systems. Cardiovascular disease risk includes arrhythmias, atrial fibrillation, hypertension, coronary artery disease, and stroke. Metabolic disorders include diabetes type 2, non-alcoholic fatty liver disease. Mood disorder especially depression; and cognitive decline especially in the elderly. Associations with chronic reflux/Morris's esophagus some but not all inclusive. -Reasons include arousal consistent with hypopnea; respiratory events most prominent in REM sleep or supine position; therefore sleep staging and body position are important for accurate diagnosis and estimation of AHI.  Problem 3: Cardiac -Recommended to follow up with cardiologist if needed  Problem 4: Poor Mechanics of Breathing -Recommended Wiyan Hof and Buteyko breathing techniques - Proper breathing techniques were reviewed with an emphasis of exhalation. Patient instructed to breath in for 1 second and out for four seconds. Patient was encouraged to not talk while walking.  Problem 5: overweight/out of shape -recommended berberine OTC  -on Mounjaro -Recommend "Muniq" OTC Supplement for weight loss, energy levels, and blood sugar levels - Weight loss, exercise and diet control were discussed and are highly encouraged. Treatment options were given such as aqua therapy, and contacting a nutritionist. Recommended to use the elliptical, stationary bike, less use of treadmill. Mindful eating was explained to the patient. Obesity is associated with worsening asthma, SOB, and potential for cardiac disease, diabetes, and other underlying medical conditions.  Problem 5A: GERD -continue Tums -recommended Reflux Gourmet OTC -Rule of 2s: avoid eating too much, eating too late, eating too spicy, eating two hours before bed. - Things to avoid including overeating, spicy foods, tight clothing, eating within two hours of bed, this list is not all inclusive. - For treatments of reflux, possible options discussed including diet control, H2 blockers, PPIs, as well as coating motility agents discussed as treatment options. Timing of meals and proximity of last meal to sleep were discussed. If symptoms persist, a formal gastrointestinal evaluation is needed.   Problem 6: Health Maintenance/COVID19 Precautions: -recommended SaNOtize nasal spray -s/p Pfizer COVID 19 vaccine x4 Immune Support Recommendations: -OTC Vitamin C 500mg BID -OTC Quercetin 250-500mg BID -OTC Zinc 75-100mg per day -OTC Melatonin 1 or 2 mg a night -OTC Vitamin D 1-4000mg per day -OTC Tonic Water 8oz per day Asthma and COVID19: You need to make sure your asthma is under control. This often requires the use of inhaled corticosteroids (and sometimes oral corticosteroids). Inhaled corticosteroids do not likely reduce your immune system's ability to fight infections, but oral corticosteroids may. It is important to use the steps above to protect yourself to limit your exposure to any respiratory virus.  Problem 7: health maintenance -s/p influenza vaccine - 2022 -recommended strep pneumonia vaccines after age 65: Prevnar-13 vaccine, followed by Pneumo vaccine 23 one year following -recommended early intervention for URIs -recommended regular osteoporosis evaluations -recommended early dermatological evaluations -recommended after the age of 50 to the age of 70, colonoscopy every 5 years   Follow up in 4 months - SPI -he is recommended to call with any changes, questions, or concerns.

## 2024-01-23 NOTE — PHYSICAL EXAM
[No Acute Distress] : no acute distress [Normal Oropharynx] : normal oropharynx [III] : Mallampati Class: III [Normal Appearance] : normal appearance [Normal S1, S2] : normal s1, s2 [No Neck Mass] : no neck mass [Normal Rate/Rhythm] : normal rate/rhythm [No Murmurs] : no murmurs [No Resp Distress] : no resp distress [No Abnormalities] : no abnormalities [Clear to Auscultation Bilaterally] : clear to auscultation bilaterally [No Clubbing] : no clubbing [Benign] : benign [Normal Gait] : normal gait [No Cyanosis] : no cyanosis [No Edema] : no edema [Normal Color/ Pigmentation] : normal color/ pigmentation [FROM] : FROM [Oriented x3] : oriented x3 [Normal Affect] : normal affect [No Focal Deficits] : no focal deficits [TextBox_2] : OW [TextBox_68] : I:E 1:3; Clear

## 2024-01-23 NOTE — HISTORY OF PRESENT ILLNESS
[FreeTextEntry1] : Mr. OLIVER is a 58 year old male with a history of obesity, post nasal drip, and SOB, presenting to the office today for a follow up pulmonary evaluation. His chief complaint is  -he notes feeling generally well  -he denies exercising  -he notes his GI tract is abnormal -he notes he is on Mounjaro to manage sugar -he notes that he has lost weight (25 lbs) -he notes constant reflux and heartburn -he notes back pain occasionally -he denies coughing and wheezing  -patient denies any headaches, nausea, fever, chills, sweats, chest pain, chest pressure, palpitations, dysphagia, dizziness, leg swelling, leg pain, itchy eyes, itchy ears

## 2024-01-23 NOTE — ADDENDUM
[FreeTextEntry1] :  Documented by Sachin Tavares acting as a scribe for Dr. Pancho Byrne on 01/23/2024.   All medical record entries made by the Scribe were at my, Dr. Pancho Byrne's, direction and personally dictated by me on 01/23/2024. I have reviewed the chart and agree that the record accurately reflects my personal performance of the history, physical exam, assessment and plan. I have also personally directed, reviewed, and agree with the discharge instructions.

## 2024-01-23 NOTE — PROCEDURE
[FreeTextEntry1] : Feno was 34; a normal value being less than 25. The American [employer] Society (ATS) strongly recommends the use of FeNO measurement to aid in the assessment, management, and long-term monitoring of asthma. In their 2011 clinical practice guideline, the ATS emphasizes the importance of using FeNO.   PFT revealed normal flows, with a FEV1 of 3.21L, which is 108% of predicted, with a normal flow volume loop. -PFTs performed today for evaluation of SOB (01/23/2024)

## 2024-01-24 ENCOUNTER — APPOINTMENT (OUTPATIENT)
Dept: ENDOCRINOLOGY | Facility: CLINIC | Age: 59
End: 2024-01-24
Payer: COMMERCIAL

## 2024-01-24 VITALS
RESPIRATION RATE: 16 BRPM | HEART RATE: 75 BPM | HEIGHT: 66 IN | TEMPERATURE: 98.2 F | WEIGHT: 222 LBS | BODY MASS INDEX: 35.68 KG/M2 | DIASTOLIC BLOOD PRESSURE: 82 MMHG | SYSTOLIC BLOOD PRESSURE: 134 MMHG | OXYGEN SATURATION: 98 %

## 2024-01-24 DIAGNOSIS — I10 ESSENTIAL (PRIMARY) HYPERTENSION: ICD-10-CM

## 2024-01-24 LAB — HBA1C MFR BLD HPLC: 7.2

## 2024-01-24 PROCEDURE — 99214 OFFICE O/P EST MOD 30 MIN: CPT

## 2024-01-24 PROCEDURE — 95251 CONT GLUC MNTR ANALYSIS I&R: CPT

## 2024-01-24 PROCEDURE — G2211 COMPLEX E/M VISIT ADD ON: CPT

## 2024-01-24 PROCEDURE — 83036 HEMOGLOBIN GLYCOSYLATED A1C: CPT | Mod: QW

## 2024-01-24 NOTE — HISTORY OF PRESENT ILLNESS
[FreeTextEntry1] : 59 y/o M w/ hx of Type 2 DM diagnosed in 2000 around the time of getting steroids for MS. Prior to 2000 was diagnosed with prediabetes. Started on insulin with steroids. Had been getting infusions every 6 months which were on hold for COVID. Infusions are premedicated with steroid so takes sliding scale of Humalog with meals and bedtime which he takes until his blood glucose comes down to normal. Last infusion 6 months ago. Otherwise on metformin 1000 mg BID, Jardiance 25 mg daily, and Mounjaro 7.5 mg weekly which replaced Victoza 1.8 mg daily. Mopunjaro increased from 5mg to 7.5mg 1/2024. Added Prandin 1mg with dinner given pattern of hyperglycemia after dinner 3/2023. Has not felt the need to take Prandin as much over the past few months since starting Mounjaro. Had COVID 9/2022 with hyperglycemia with some nonadherence to meds around that time. Now improved. Has been more adherent to low carb diet. Checks glucose >4x/day with Freestyle Bryan with average glucose 140. Reported hypoglycemia with symptoms of hypoglycemia when on Saxenda. Was told no longer covered. +polyuria with neurogenic bladder on Mybetriq, no polydipsia unless eats too much glucose. No nausea or vomiting. Had noticed some increased indigestion on Mounjaro. +weight loss..  Follows with podiatry  Dr. Hearn for routine diabetes foot care.  Last seen 1/2024. No hx of neuropathy in feet.  No hx of nephropathy No hx of retinopathy. Last retinal exam with Dr. Anne 2023. Next appointment 2/29/2024.  Had mechanical fall on college tour with son. Fractures right rib 2022. Fell while walking the dog, fractured left 5th digit. No other fractures.  HTN on valsartan and amlodipine  HLD on statin

## 2024-01-24 NOTE — DATA REVIEWED
[FreeTextEntry1] : Bryan 1/24/2024:  Reviewed and interpreted with values mostly at goal with occasional hyperglycemia occasionally after a meal. No hypoglycemia. At goal 91% of the time. Hyperglycemic 9%. No hypoglycemia. Highest values occasionally after lunch usually.    Labs 1/24/2024: A1c 7.2%  Labs 10/2023: CBC normal  HDL 42 Chol 182  CMP normal glucose 115, AST 60 TSH 2.36 Micro/Cr 19  Bryan 6/28/2023:  Reviewed and interpreted with values mostly at goal with occasional hyperglycemia occasionally after a meal. No hypoglycemia. At goal 89% of the time. Hyperglycemic 10%. No hypoglycemia. Highest values occasionally after breakfast and dinner.   Labs 10/18/2023 A1c 7.3%  Bryan 6/28/2023:  Reviewed and interpreted with values mostly at goal with occasional hyperglycemia occasionally after a meal. No hypoglycemia. At goal 67% of the time. Hyperglycemic 33%. No hypoglycemia. Highest values occasionally after breakfast and dinner.   Labs 6/28/2023: A1c 7.9%  Labs 6/2/2023: Glucose 196 A1c 8% CBC normal CMP otherwise normal  Bryan 3/27/2023:  Reviewed and interpreted with values mostly at goal with occasional hyperglycemia occasionally after a meal. No hypoglycemia. At goal 61% of the time. Hyperglycemic 39%. No hypoglycemia. Highest values after breakfast and mostly dinner.   Labs 3/27/2023: A1c 8%  Bryan 11/23/2022:  Reviewed and interpreted with values mostly at goal with occasional hyperglycemia occasionally after a meal. No hypoglycemia. At goal 91% of the time. Hyperglycemic 9%. No hypoglycemia. Highest values after breakfast.   Labs 11/23/2022: A1c 7.2%  Bryan 8/10/2022:  Reviewed and interpreted with values mostly at goal with occasional hyperglycemia occasionally after a meal. No hypoglycemia. At goal 74% of the time. Hyperglycemic 26%. No hypoglycemia. Highest values 8/4-8/5 s/p Solu-Medrol.   Labs 8/10/2022: A1c 6.8%  Bryan 4/27/2022:  Reviewed and interpreted with values mostly at goal with occasional hyperglycemia occasionally after a meal. No hypoglycemia. At goal 80% of the time. Hyperglycemic 20%. No hypoglycemia.   Labs 4/25//2022: A1c 6.8% CBC normal CMP normal except glucose of 133  Bryan 1/19/2022:  Reviewed and interpreted with values mostly at goal with occasional hyperglycemia occasionally after a meal. No hypoglycemia. At goal 84% of the time. Hyperglycemic 16%. No hypoglycemia.   Labs 1/19/2022: A1c 7.3%  Bryan 9/15/2021:  Reviewed and interpreted with values mostly at goal with occasional hyperglycemia occasionally after a meal. No hypoglycemia. At goal 85% of the time. Hyperglycemic 15%. No hypoglycemia.   Labs 9/15/2021: A1c 6.7%  Bryan 6/15/2021:  Reviewed and interpreted with values mostly at goal with occasional hyperglycemia occasionally after a meal. No hypoglycemia. At goal 93% of the time. Hyperglycemic 7%  Labs 6/15/2021: A1c 6.7%  Bryan 5/3/2021: Reviewed and interpreted with values mostly at goal with occasional hyperglycemia usually early morning or after a meal.  Labs 5/2021: Micro/Cr 28  Labs 2/9/2021: A1c 7.9% CMP normal except glucose of 152 Vit D 35.5

## 2024-01-24 NOTE — ASSESSMENT
[FreeTextEntry1] : 57 y/o M w/  1. Uncontrolled Type 2 DM w/ hyperglycemia- Discussed the importance of improved glycemic control to prevent long term complications from diabetes. Goal A1c <7% Had been at goal on current regimen when better adherent to diet. His regimen is optimal in terms of medications with low risk of hypoglycemia and additional weight loss, CV, and renal benefits. Currently on metformin 1000 mg BID, Victoza 1.8mg daily and Jardiance 25 mg daily. Recommended optimized dose of Mounjaro to 7.5mg weekly for additional weight loss and glycemic control benefits. Added Prandin 1mg with dinner given pattern of hyperglycemia after dinner, but not taking as consistently given improvement in glucose on Mounjaro. Discontinued Prandin and optimized dose of Mounjaro as tolerated weekly. Monitor glucose via Freestyle Bryan. Optho follow-up up to date. 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 Check micro/cr 10/2023.  2. HTN- monitor BP on amlodipine and valsartan. BP at goal today, if persistently elevated can increase amlodipine to 10 mg daily.  3. HLD- c/w statin .

## 2024-01-24 NOTE — REVIEW OF SYSTEMS
[Fatigue] : no fatigue [Recent Weight Gain (___ Lbs)] : no recent weight gain [Recent Weight Loss (___ Lbs)] : recent weight loss: [unfilled] lbs [Visual Field Defect] : no visual field defect [Dysphagia] : no dysphagia [Dysphonia] : no dysphonia [Chest Pain] : no chest pain [Palpitations] : no palpitations [Shortness Of Breath] : no shortness of breath [Nausea] : no nausea [Vomiting] : no vomiting [Polyuria] : polyuria [Joint Pain] : joint pain [Pain/Numbness of Digits] : pain/numbness of digits [Polydipsia] : polydipsia [All other systems negative] : All other systems negative

## 2024-01-24 NOTE — PHYSICAL EXAM
[Alert] : alert [Healthy Appearance] : healthy appearance [No Acute Distress] : no acute distress [EOMI] : extra ocular movement intact [Normal Hearing] : hearing was normal [Supple] : the neck was supple [Thyroid Not Enlarged] : the thyroid was not enlarged [No Respiratory Distress] : no respiratory distress [No Accessory Muscle Use] : no accessory muscle use [Normal Rate and Effort] : normal respiratory rate and effort [Clear to Auscultation] : lungs were clear to auscultation bilaterally [Normal S1, S2] : normal S1 and S2 [Normal Rate] : heart rate was normal [Regular Rhythm] : with a regular rhythm [Normal Bowel Sounds] : normal bowel sounds [Not Tender] : non-tender [Not Distended] : not distended [Soft] : abdomen soft [Kyphosis] : no kyphosis present [No Clubbing, Cyanosis] : no clubbing  or cyanosis of the fingernails [No Involuntary Movements] : no involuntary movements were seen [Acanthosis Nigricans] : no acanthosis nigricans [Acne] : no acne [Right foot was examined, including] : right foot ~C was examined, including visual inspection with sensory and pulse exams [Left foot was examined, including] : left foot ~C was examined, including visual inspection with sensory and pulse exams [Normal] : normal [2+] : 2+ in the dorsalis pedis [Diminished Throughout Both Feet] : normal tactile sensation with monofilament testing throughout both feet [Oriented x3] : oriented to person, place, and time [Normal Affect] : the affect was normal [Normal Mood] : the mood was normal [de-identified] : +trace LE edema b/l [de-identified] : +hand cast on left hand

## 2024-02-08 ENCOUNTER — TRANSCRIPTION ENCOUNTER (OUTPATIENT)
Age: 59
End: 2024-02-08

## 2024-02-08 RX ORDER — VALSARTAN 160 MG/1
160 TABLET, COATED ORAL
Qty: 90 | Refills: 3 | Status: ACTIVE | COMMUNITY
Start: 2019-05-09 | End: 1900-01-01

## 2024-02-12 ENCOUNTER — TRANSCRIPTION ENCOUNTER (OUTPATIENT)
Age: 59
End: 2024-02-12

## 2024-02-12 RX ORDER — METFORMIN HYDROCHLORIDE 1000 MG/1
1000 TABLET, COATED ORAL
Qty: 180 | Refills: 3 | Status: ACTIVE | COMMUNITY
Start: 2019-09-23 | End: 1900-01-01

## 2024-02-26 ENCOUNTER — TRANSCRIPTION ENCOUNTER (OUTPATIENT)
Age: 59
End: 2024-02-26

## 2024-02-26 RX ORDER — AMLODIPINE BESYLATE 5 MG/1
5 TABLET ORAL
Qty: 90 | Refills: 1 | Status: ACTIVE | COMMUNITY
Start: 2019-04-27 | End: 1900-01-01

## 2024-03-05 NOTE — DISCHARGE NOTE PROVIDER - NSDCQMPCI_CARD_ALL_CORE
No protocol for requested medication.    Medication: VITAMINS  Last office visit date: 6/12/23  Pharmacy: [facility] [place] [facility] [address]    Order pended, routed to clinician for review.    No

## 2024-03-11 ENCOUNTER — TRANSCRIPTION ENCOUNTER (OUTPATIENT)
Age: 59
End: 2024-03-11

## 2024-03-11 RX ORDER — EMPAGLIFLOZIN 25 MG/1
25 TABLET, FILM COATED ORAL
Qty: 90 | Refills: 3 | Status: ACTIVE | COMMUNITY
Start: 2019-04-02 | End: 1900-01-01

## 2024-05-01 ENCOUNTER — APPOINTMENT (OUTPATIENT)
Dept: ENDOCRINOLOGY | Facility: CLINIC | Age: 59
End: 2024-05-01
Payer: COMMERCIAL

## 2024-05-01 VITALS
SYSTOLIC BLOOD PRESSURE: 145 MMHG | OXYGEN SATURATION: 98 % | DIASTOLIC BLOOD PRESSURE: 83 MMHG | BODY MASS INDEX: 35.99 KG/M2 | HEART RATE: 76 BPM | WEIGHT: 223 LBS

## 2024-05-01 DIAGNOSIS — E78.5 HYPERLIPIDEMIA, UNSPECIFIED: ICD-10-CM

## 2024-05-01 DIAGNOSIS — E11.65 TYPE 2 DIABETES MELLITUS WITH HYPERGLYCEMIA: ICD-10-CM

## 2024-05-01 DIAGNOSIS — I10 ESSENTIAL (PRIMARY) HYPERTENSION: ICD-10-CM

## 2024-05-01 LAB — HBA1C MFR BLD HPLC: 7.2

## 2024-05-01 PROCEDURE — 99214 OFFICE O/P EST MOD 30 MIN: CPT

## 2024-05-01 PROCEDURE — 83036 HEMOGLOBIN GLYCOSYLATED A1C: CPT | Mod: QW

## 2024-05-01 NOTE — ASSESSMENT
[FreeTextEntry1] : 59 y/o M w/  1. Uncontrolled Type 2 DM w/ hyperglycemia- Discussed the importance of improved glycemic control to prevent long term complications from diabetes. Goal A1c <7% Had been at goal on current regimen when better adherent to diet. His regimen is optimal in terms of medications with low risk of hypoglycemia and additional weight loss, CV, and renal benefits. Currently on metformin 1000 mg BID, Victoza 1.8mg daily and Jardiance 25 mg daily. Recommend optimize dose of Mounjaro to 10 mg weekly for additional weight loss and glycemic control benefits. Added Prandin 1mg with dinner given pattern of hyperglycemia after dinner, but not taking as consistently given improvement in glucose on Mounjaro. Discontinued Prandin and optimized dose of Mounjaro as tolerated weekly. Monitor glucose via Freestyle Bryan. Optho follow-up up to date. 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 Check micro/cr 10/2024.  2. HTN- monitor BP on amlodipine and valsartan. BP at goal today, if persistently elevated can increase amlodipine to 10 mg daily.  3. HLD- c/w statin .

## 2024-05-01 NOTE — PHYSICAL EXAM
[Alert] : alert [Healthy Appearance] : healthy appearance [No Acute Distress] : no acute distress [EOMI] : extra ocular movement intact [Normal Hearing] : hearing was normal [Supple] : the neck was supple [Thyroid Not Enlarged] : the thyroid was not enlarged [No Respiratory Distress] : no respiratory distress [No Accessory Muscle Use] : no accessory muscle use [Normal Rate and Effort] : normal respiratory rate and effort [Clear to Auscultation] : lungs were clear to auscultation bilaterally [Normal S1, S2] : normal S1 and S2 [Normal Rate] : heart rate was normal [Regular Rhythm] : with a regular rhythm [Normal Bowel Sounds] : normal bowel sounds [Not Tender] : non-tender [Not Distended] : not distended [Soft] : abdomen soft [No Clubbing, Cyanosis] : no clubbing  or cyanosis of the fingernails [No Involuntary Movements] : no involuntary movements were seen [Right foot was examined, including] : right foot ~C was examined, including visual inspection with sensory and pulse exams [Left foot was examined, including] : left foot ~C was examined, including visual inspection with sensory and pulse exams [Normal] : normal [2+] : 2+ in the dorsalis pedis [Oriented x3] : oriented to person, place, and time [Normal Affect] : the affect was normal [Normal Mood] : the mood was normal [No Edema] : no peripheral edema [Kyphosis] : no kyphosis present [Acanthosis Nigricans] : no acanthosis nigricans [Acne] : no acne [Diminished Throughout Both Feet] : normal tactile sensation with monofilament testing throughout both feet [FreeTextEntry1] : +callus [FreeTextEntry5] : +callus

## 2024-05-01 NOTE — DATA REVIEWED
[FreeTextEntry1] : Bryan 5/1/2024:  Reviewed and interpreted with values mostly at goal with occasional hyperglycemia occasionally after a meal. No hypoglycemia. At goal 84% of the time. Hyperglycemic 16%. No hypoglycemia. Highest values occasionally after lunch usually.    Labs 5/1/2024: A1c 7.2%  Bryan 1/24/2024:  Reviewed and interpreted with values mostly at goal with occasional hyperglycemia occasionally after a meal. No hypoglycemia. At goal 91% of the time. Hyperglycemic 9%. No hypoglycemia. Highest values occasionally after lunch usually.    Labs 1/24/2024: A1c 7.2%  Labs 10/2023: CBC normal  HDL 42 Chol 182  CMP normal glucose 115, AST 60 TSH 2.36 Micro/Cr 19  Bryan 6/28/2023:  Reviewed and interpreted with values mostly at goal with occasional hyperglycemia occasionally after a meal. No hypoglycemia. At goal 89% of the time. Hyperglycemic 10%. No hypoglycemia. Highest values occasionally after breakfast and dinner.   Labs 10/18/2023 A1c 7.3%  Bryan 6/28/2023:  Reviewed and interpreted with values mostly at goal with occasional hyperglycemia occasionally after a meal. No hypoglycemia. At goal 67% of the time. Hyperglycemic 33%. No hypoglycemia. Highest values occasionally after breakfast and dinner.   Labs 6/28/2023: A1c 7.9%  Labs 6/2/2023: Glucose 196 A1c 8% CBC normal CMP otherwise normal  Bryan 3/27/2023:  Reviewed and interpreted with values mostly at goal with occasional hyperglycemia occasionally after a meal. No hypoglycemia. At goal 61% of the time. Hyperglycemic 39%. No hypoglycemia. Highest values after breakfast and mostly dinner.   Labs 3/27/2023: A1c 8%  Bryan 11/23/2022:  Reviewed and interpreted with values mostly at goal with occasional hyperglycemia occasionally after a meal. No hypoglycemia. At goal 91% of the time. Hyperglycemic 9%. No hypoglycemia. Highest values after breakfast.   Labs 11/23/2022: A1c 7.2%  Bryan 8/10/2022:  Reviewed and interpreted with values mostly at goal with occasional hyperglycemia occasionally after a meal. No hypoglycemia. At goal 74% of the time. Hyperglycemic 26%. No hypoglycemia. Highest values 8/4-8/5 s/p Solu-Medrol.   Labs 8/10/2022: A1c 6.8%  Bryan 4/27/2022:  Reviewed and interpreted with values mostly at goal with occasional hyperglycemia occasionally after a meal. No hypoglycemia. At goal 80% of the time. Hyperglycemic 20%. No hypoglycemia.   Labs 4/25//2022: A1c 6.8% CBC normal CMP normal except glucose of 133  Bryan 1/19/2022:  Reviewed and interpreted with values mostly at goal with occasional hyperglycemia occasionally after a meal. No hypoglycemia. At goal 84% of the time. Hyperglycemic 16%. No hypoglycemia.   Labs 1/19/2022: A1c 7.3%  Bryan 9/15/2021:  Reviewed and interpreted with values mostly at goal with occasional hyperglycemia occasionally after a meal. No hypoglycemia. At goal 85% of the time. Hyperglycemic 15%. No hypoglycemia.   Labs 9/15/2021: A1c 6.7%  Bryan 6/15/2021:  Reviewed and interpreted with values mostly at goal with occasional hyperglycemia occasionally after a meal. No hypoglycemia. At goal 93% of the time. Hyperglycemic 7%  Labs 6/15/2021: A1c 6.7%  Bryan 5/3/2021: Reviewed and interpreted with values mostly at goal with occasional hyperglycemia usually early morning or after a meal.  Labs 5/2021: Micro/Cr 28  Labs 2/9/2021: A1c 7.9% CMP normal except glucose of 152 Vit D 35.5

## 2024-05-01 NOTE — REVIEW OF SYSTEMS
[Recent Weight Loss (___ Lbs)] : recent weight loss: [unfilled] lbs [Polyuria] : polyuria [Joint Pain] : joint pain [Pain/Numbness of Digits] : pain/numbness of digits [Polydipsia] : polydipsia [All other systems negative] : All other systems negative [Fatigue] : no fatigue [Recent Weight Gain (___ Lbs)] : no recent weight gain [Visual Field Defect] : no visual field defect [Dysphagia] : no dysphagia [Dysphonia] : no dysphonia [Chest Pain] : no chest pain [Palpitations] : no palpitations [Shortness Of Breath] : no shortness of breath [Nausea] : no nausea [Vomiting] : no vomiting

## 2024-05-01 NOTE — HISTORY OF PRESENT ILLNESS
[FreeTextEntry1] : 59 y/o M w/ hx of Type 2 DM diagnosed in 2000 around the time of getting steroids for MS. Prior to 2000 was diagnosed with prediabetes. Started on insulin with steroids. Had been getting infusions every 6 months which were on hold for COVID. Infusions are premedicated with steroid so takes sliding scale of Humalog with meals and bedtime which he takes until his blood glucose comes down to normal. Last infusion 6 months ago. Otherwise on metformin 1000 mg BID, Jardiance 25 mg daily, and Mounjaro 7.5 mg weekly which replaced Victoza 1.8 mg daily. Mounjaro increased from 5mg to 7.5mg 1/2024. Added Prandin 1mg with dinner given pattern of hyperglycemia after dinner 3/2023. Has not felt the need to take Prandin as much over the past few months since starting Mounjaro. Had COVID 9/2022 with hyperglycemia with some nonadherence to meds around that time. Now improved. Has been more adherent to low carb diet. Checks glucose >4x/day with Freestyle Bryan with average glucose 140. Reported hypoglycemia with symptoms of hypoglycemia when on Saxenda. Was told no longer covered. +polyuria with neurogenic bladder on Mybetriq, no polydipsia unless eats too much glucose. No nausea or vomiting. Had noticed some increased indigestion on Mounjaro. +weight loss. +constipation.   Follows with podiatry Dr. Hearn for routine diabetes foot care.  Last seen 1/2024. No hx of neuropathy in feet.  No hx of nephropathy No hx of retinopathy. Last retinal exam with Dr. Anne appointment 2/29/2024.  Had mechanical fall on college tour with son. Fractures right rib 2022. Fell while walking the dog, fractured left 5th digit. No other fractures.  HTN on valsartan and amlodipine  HLD on statin

## 2024-06-02 ENCOUNTER — TRANSCRIPTION ENCOUNTER (OUTPATIENT)
Age: 59
End: 2024-06-02

## 2024-06-02 RX ORDER — FLASH GLUCOSE SENSOR
KIT MISCELLANEOUS
Qty: 7 | Refills: 3 | Status: ACTIVE | COMMUNITY
Start: 2024-06-02 | End: 1900-01-01

## 2024-06-06 ENCOUNTER — NON-APPOINTMENT (OUTPATIENT)
Age: 59
End: 2024-06-06

## 2024-06-11 ENCOUNTER — TRANSCRIPTION ENCOUNTER (OUTPATIENT)
Age: 59
End: 2024-06-11

## 2024-06-21 ENCOUNTER — TRANSCRIPTION ENCOUNTER (OUTPATIENT)
Age: 59
End: 2024-06-21

## 2024-06-24 ENCOUNTER — TRANSCRIPTION ENCOUNTER (OUTPATIENT)
Age: 59
End: 2024-06-24

## 2024-06-24 RX ORDER — TIRZEPATIDE 10 MG/.5ML
10 INJECTION, SOLUTION SUBCUTANEOUS
Qty: 3 | Refills: 3 | Status: ACTIVE | COMMUNITY
Start: 2023-06-28 | End: 1900-01-01

## 2024-07-23 ENCOUNTER — APPOINTMENT (OUTPATIENT)
Dept: PULMONOLOGY | Facility: CLINIC | Age: 59
End: 2024-07-23
Payer: COMMERCIAL

## 2024-07-23 VITALS
DIASTOLIC BLOOD PRESSURE: 80 MMHG | RESPIRATION RATE: 16 BRPM | OXYGEN SATURATION: 97 % | TEMPERATURE: 97.9 F | BODY MASS INDEX: 35.36 KG/M2 | HEIGHT: 66 IN | WEIGHT: 220 LBS | SYSTOLIC BLOOD PRESSURE: 110 MMHG | HEART RATE: 79 BPM

## 2024-07-23 DIAGNOSIS — E66.9 OBESITY, UNSPECIFIED: ICD-10-CM

## 2024-07-23 DIAGNOSIS — R06.02 SHORTNESS OF BREATH: ICD-10-CM

## 2024-07-23 DIAGNOSIS — K21.9 GASTRO-ESOPHAGEAL REFLUX DISEASE W/OUT ESOPHAGITIS: ICD-10-CM

## 2024-07-23 DIAGNOSIS — J45.909 UNSPECIFIED ASTHMA, UNCOMPLICATED: ICD-10-CM

## 2024-07-23 DIAGNOSIS — G47.33 OBSTRUCTIVE SLEEP APNEA (ADULT) (PEDIATRIC): ICD-10-CM

## 2024-07-23 PROCEDURE — 95012 NITRIC OXIDE EXP GAS DETER: CPT

## 2024-07-23 PROCEDURE — 99214 OFFICE O/P EST MOD 30 MIN: CPT | Mod: 25

## 2024-07-23 PROCEDURE — ZZZZZ: CPT

## 2024-07-23 NOTE — ASSESSMENT
[FreeTextEntry1] : Mr. OLIVER is a 58 year old male with a history of HLD, RADS, HTN, DM, MS, JERRELL, and allergies who comes into the office today for a follow up pulmonary evaluation - His number one issue is s/p fall- rib issues (improving)  The patient's shortness of breath is multifactorial due to: -pulmonary disease  -has risk factors for asthma (allergy) -poor breathing mechanics -overweight/out of shape -CAD  Problem 1: Allergy / Sinus - quiet -Continue using Zyrtec 5 mg QHS -Environmental measures for allergies were encouraged including mattress and pillow cover, air purifier, and environmental controls.  Problem 1A: RADs / Asthma - off Rx  Problem 2: JERRELL (compliant)- ? still present -repeat HSS -s/p split night sleep study for memory concentration, nocturia, daily fatigue, metabolism. -Resmed Fx wide full nasal mask (medium), with 8 cm pressure ([facility]) -Sleep apnea is associated with adverse clinical consequences which an affect most organ systems. Cardiovascular disease risk includes arrhythmias, atrial fibrillation, hypertension, coronary artery disease, and stroke. Metabolic disorders include diabetes type 2, non-alcoholic fatty liver disease. Mood disorder especially depression; and cognitive decline especially in the elderly. Associations with chronic reflux/Morris's esophagus some but not all inclusive. -Reasons include arousal consistent with hypopnea; respiratory events most prominent in REM sleep or supine position; therefore sleep staging and body position are important for accurate diagnosis and estimation of AHI.  Problem 3: Cardiac -Recommended to follow up with cardiologist if needed  Problem 4: Poor Mechanics of Breathing -Recommended Heidi Serrano and Buteyko breathing techniques - Proper breathing techniques were reviewed with an emphasis of exhalation. Patient instructed to breath in for 1 second and out for four seconds. Patient was encouraged to not talk while walking.  Problem 5: overweight/out of shape (70 lbs reduction) -recommended berberine OTC  -on Mounjaro -Recommend "Muniq" OTC Supplement for weight loss, energy levels, and blood sugar levels - Weight loss, exercise and diet control were discussed and are highly encouraged. Treatment options were given such as aqua therapy, and contacting a nutritionist. Recommended to use the elliptical, stationary bike, less use of treadmill. Mindful eating was explained to the patient. Obesity is associated with worsening asthma, SOB, and potential for cardiac disease, diabetes, and other underlying medical conditions.  Problem 5A: GERD -continue Tums -recommended Reflux Gourmet OTC -Rule of 2s: avoid eating too much, eating too late, eating too spicy, eating two hours before bed. - Things to avoid including overeating, spicy foods, tight clothing, eating within two hours of bed, this list is not all inclusive. - For treatments of reflux, possible options discussed including diet control, H2 blockers, PPIs, as well as coating motility agents discussed as treatment options. Timing of meals and proximity of last meal to sleep were discussed. If symptoms persist, a formal gastrointestinal evaluation is needed.   Problem 6: Health Maintenance/COVID19 Precautions: -recommended SaNOtize nasal spray -s/p Pfizer COVID 19 vaccine x4 Immune Support Recommendations: -OTC Vitamin C 500mg BID -OTC Quercetin 250-500mg BID -OTC Zinc 75-100mg per day -OTC Melatonin 1 or 2 mg a night -OTC Vitamin D 1-4000mg per day -OTC Tonic Water 8oz per day Asthma and COVID19: You need to make sure your asthma is under control. This often requires the use of inhaled corticosteroids (and sometimes oral corticosteroids). Inhaled corticosteroids do not likely reduce your immune system's ability to fight infections, but oral corticosteroids may. It is important to use the steps above to protect yourself to limit your exposure to any respiratory virus.  Problem 7: health maintenance -recommend TDAP -s/p influenza vaccine - 2023 -recommended strep pneumonia vaccines after age 65: Prevnar-13 vaccine, followed by Pneumo vaccine 23 one year following -recommended early intervention for URIs -recommended regular osteoporosis evaluations -recommended early dermatological evaluations -recommended after the age of 50 to the age of 70, colonoscopy every 5 years   Follow up in 4 months - SPI -he is recommended to call with any changes, questions, or concerns.

## 2024-07-23 NOTE — HISTORY OF PRESENT ILLNESS
[FreeTextEntry1] : Mr. OLIVER is a 58 year old male with a history of obesity, post nasal drip, and SOB, presenting to the office today for a follow up pulmonary evaluation. His chief complaint is   he notes feeling well -he notes constipation- Miralax in place he notes sleep is good  -he notes that his energy levels are good when he sleeps well -he notes walking for 45 min a day for exercise -he notes he fell in the yard and has a bruise on his left rib   -he notes his sense of smell and taste are normal   -Patient denies any headaches, nausea, vomiting, fever, chills, sweats, chest pain, chest pressure, palpitations, coughing, wheezing, fatigue, diarrhea, dysphagia, arthralgias, myalgias, dizziness, leg swelling, leg pain, itchy eyes, itchy ears, dysphonia, heartburn, reflux or sour taste in mouth.

## 2024-07-23 NOTE — PROCEDURE
[FreeTextEntry1] : Pt has bruising in rib area, ants to pass on PFT at this time.   FENO was 46; a normal value being less than 25. Fractional exhaled nitric oxide (FENO) is regarded as a simple, noninvasive method for assessing eosinophilic airway inflammation. Produced by a variety of cells within the lung, nitric oxide (NO) concentrations are generally low in healthy individuals. However, high concentrations of NO appear to be involved in nonspecific host defense mechanisms and chronic inflammatory diseases such as asthma. The American Thoracic Society (ATS) therefore has strongly recommended using FENO to aid in the assessment, management, and long-term monitoring of eosinophilic airway inflammation and asthma, and for identifying steroid responsive individuals whose chronic respiratory symptoms may be caused by airway inflammation. In their 2011 clinical practice guideline, the ATS emphasizes the importance of using FENO.

## 2024-07-23 NOTE — ADDENDUM
[FreeTextEntry1] :  Documented by Cinthia Viramontes acting as a scribe for Dr. Pancho Byrne on 07/23/2024 .   All medical record entries made by the Scribe were at my, Dr. Pancho Byrne's direction and personally dictated by me on 07/23/2024 . I have reviewed the chart and agree that the record accurately reflects my personal performance of the history, Physical exam, assessment, and plan. I have also personally directed, reviewed, and agree with the discharge instructions.

## 2024-08-22 ENCOUNTER — TRANSCRIPTION ENCOUNTER (OUTPATIENT)
Age: 59
End: 2024-08-22

## 2024-08-27 ENCOUNTER — TRANSCRIPTION ENCOUNTER (OUTPATIENT)
Age: 59
End: 2024-08-27

## 2024-08-27 ENCOUNTER — APPOINTMENT (OUTPATIENT)
Dept: PULMONOLOGY | Facility: CLINIC | Age: 59
End: 2024-08-27
Payer: COMMERCIAL

## 2024-08-27 PROCEDURE — 99442: CPT

## 2024-09-04 ENCOUNTER — APPOINTMENT (OUTPATIENT)
Dept: ENDOCRINOLOGY | Facility: CLINIC | Age: 59
End: 2024-09-04

## 2024-09-09 ENCOUNTER — APPOINTMENT (OUTPATIENT)
Dept: ENDOCRINOLOGY | Facility: CLINIC | Age: 59
End: 2024-09-09
Payer: COMMERCIAL

## 2024-09-09 VITALS
DIASTOLIC BLOOD PRESSURE: 87 MMHG | HEIGHT: 66 IN | WEIGHT: 220 LBS | SYSTOLIC BLOOD PRESSURE: 148 MMHG | BODY MASS INDEX: 35.36 KG/M2 | HEART RATE: 74 BPM | OXYGEN SATURATION: 99 %

## 2024-09-09 DIAGNOSIS — E11.65 TYPE 2 DIABETES MELLITUS WITH HYPERGLYCEMIA: ICD-10-CM

## 2024-09-09 DIAGNOSIS — E78.5 HYPERLIPIDEMIA, UNSPECIFIED: ICD-10-CM

## 2024-09-09 DIAGNOSIS — I10 ESSENTIAL (PRIMARY) HYPERTENSION: ICD-10-CM

## 2024-09-09 LAB — HBA1C MFR BLD HPLC: 7

## 2024-09-09 PROCEDURE — 99214 OFFICE O/P EST MOD 30 MIN: CPT

## 2024-09-09 PROCEDURE — 83036 HEMOGLOBIN GLYCOSYLATED A1C: CPT | Mod: QW

## 2024-09-09 PROCEDURE — 95251 CONT GLUC MNTR ANALYSIS I&R: CPT

## 2024-09-09 NOTE — DATA REVIEWED
[FreeTextEntry1] : Bryan 9/9/2024:  Reviewed and interpreted with values mostly at goal with occasional hyperglycemia occasionally after a meal. No hypoglycemia. At goal 98% of the time. Hyperglycemic 2%. No hypoglycemia. Highest values occasionally after lunch usually.    Labs 9/9/2024: A1c 7%  Bryan 5/1/2024:  Reviewed and interpreted with values mostly at goal with occasional hyperglycemia occasionally after a meal. No hypoglycemia. At goal 84% of the time. Hyperglycemic 16%. No hypoglycemia. Highest values occasionally after lunch usually.    Labs 5/1/2024: A1c 7.2%  Bryan 1/24/2024:  Reviewed and interpreted with values mostly at goal with occasional hyperglycemia occasionally after a meal. No hypoglycemia. At goal 91% of the time. Hyperglycemic 9%. No hypoglycemia. Highest values occasionally after lunch usually.    Labs 1/24/2024: A1c 7.2%  Labs 10/2023: CBC normal  HDL 42 Chol 182  CMP normal glucose 115, AST 60 TSH 2.36 Micro/Cr 19  Bryan 6/28/2023:  Reviewed and interpreted with values mostly at goal with occasional hyperglycemia occasionally after a meal. No hypoglycemia. At goal 89% of the time. Hyperglycemic 10%. No hypoglycemia. Highest values occasionally after breakfast and dinner.   Labs 10/18/2023 A1c 7.3%  Bryan 6/28/2023:  Reviewed and interpreted with values mostly at goal with occasional hyperglycemia occasionally after a meal. No hypoglycemia. At goal 67% of the time. Hyperglycemic 33%. No hypoglycemia. Highest values occasionally after breakfast and dinner.   Labs 6/28/2023: A1c 7.9%  Labs 6/2/2023: Glucose 196 A1c 8% CBC normal CMP otherwise normal  Bryan 3/27/2023:  Reviewed and interpreted with values mostly at goal with occasional hyperglycemia occasionally after a meal. No hypoglycemia. At goal 61% of the time. Hyperglycemic 39%. No hypoglycemia. Highest values after breakfast and mostly dinner.   Labs 3/27/2023: A1c 8%  [place] 11/23/2022:  Reviewed and interpreted with values mostly at goal with occasional hyperglycemia occasionally after a meal. No hypoglycemia. At goal 91% of the time. Hyperglycemic 9%. No hypoglycemia. Highest values after breakfast.   Labs 11/23/2022: A1c 7.2%  Bryan 8/10/2022:  Reviewed and interpreted with values mostly at goal with occasional hyperglycemia occasionally after a meal. No hypoglycemia. At goal 74% of the time. Hyperglycemic 26%. No hypoglycemia. Highest values 8/4-8/5 s/p Solu-Medrol.   Labs 8/10/2022: A1c 6.8%  Bryan 4/27/2022:  Reviewed and interpreted with values mostly at goal with occasional hyperglycemia occasionally after a meal. No hypoglycemia. At goal 80% of the time. Hyperglycemic 20%. No hypoglycemia.   Labs 4/25//2022: A1c 6.8% CBC normal CMP normal except glucose of 133  Bryan 1/19/2022:  Reviewed and interpreted with values mostly at goal with occasional hyperglycemia occasionally after a meal. No hypoglycemia. At goal 84% of the time. Hyperglycemic 16%. No hypoglycemia.   Labs 1/19/2022: A1c 7.3%  Bryan 9/15/2021:  Reviewed and interpreted with values mostly at goal with occasional hyperglycemia occasionally after a meal. No hypoglycemia. At goal 85% of the time. Hyperglycemic 15%. No hypoglycemia.   Labs 9/15/2021: A1c 6.7%  Bryan 6/15/2021:  Reviewed and interpreted with values mostly at goal with occasional hyperglycemia occasionally after a meal. No hypoglycemia. At goal 93% of the time. Hyperglycemic 7%  Labs 6/15/2021: A1c 6.7%  Bryan 5/3/2021: Reviewed and interpreted with values mostly at goal with occasional hyperglycemia usually early morning or after a meal.  Labs 5/2021: Micro/Cr 28  Labs 2/9/2021: A1c 7.9% CMP normal except glucose of 152 Vit D 35.5

## 2024-09-09 NOTE — PHYSICAL EXAM
[Alert] : alert [Healthy Appearance] : healthy appearance [No Acute Distress] : no acute distress [EOMI] : extra ocular movement intact [Normal Hearing] : hearing was normal [Supple] : the neck was supple [Thyroid Not Enlarged] : the thyroid was not enlarged [No Respiratory Distress] : no respiratory distress [No Accessory Muscle Use] : no accessory muscle use [Normal Rate and Effort] : normal respiratory rate and effort [Clear to Auscultation] : lungs were clear to auscultation bilaterally [Normal S1, S2] : normal S1 and S2 [Normal Rate] : heart rate was normal [Regular Rhythm] : with a regular rhythm [No Edema] : no peripheral edema [Normal Bowel Sounds] : normal bowel sounds [Not Tender] : non-tender [Not Distended] : not distended [Soft] : abdomen soft [No Clubbing, Cyanosis] : no clubbing  or cyanosis of the fingernails [No Involuntary Movements] : no involuntary movements were seen [Right foot was examined, including] : right foot ~C was examined, including visual inspection with sensory and pulse exams [Left foot was examined, including] : left foot ~C was examined, including visual inspection with sensory and pulse exams [Normal] : normal [2+] : 2+ in the dorsalis pedis [Oriented x3] : oriented to person, place, and time [Normal Affect] : the affect was normal [Normal Mood] : the mood was normal [Kyphosis] : no kyphosis present [Acanthosis Nigricans] : no acanthosis nigricans [Acne] : no acne [Diminished Throughout Both Feet] : normal tactile sensation with monofilament testing throughout both feet [FreeTextEntry1] : +callus [FreeTextEntry5] : +callus

## 2024-09-09 NOTE — ASSESSMENT
[FreeTextEntry1] : 59 y/o M w/  1. Uncontrolled Type 2 DM w/ hyperglycemia- Discussed the importance of improved glycemic control to prevent long term complications from diabetes. Goal A1c <7% Had been at goal on current regimen when better adherent to diet. His regimen is optimal in terms of medications with low risk of hypoglycemia and additional weight loss, CV, and renal benefits. Currently on metformin 1000 mg BID, Mounjaro 10mg, and Jardiance 25 mg daily. Recommend optimize dose of Mounjaro for additional weight loss and glycemic control benefits as needed. Added Prandin 1mg with dinner given pattern of hyperglycemia after dinner, but not taking as consistently given improvement in glucose on Mounjaro. Discontinued Prandin and optimized dose of Mounjaro as tolerated weekly. Monitor glucose via Freestyle Bryan. Optho follow-up up to date. 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 Check micro/cr today.   2. HTN- monitor BP on amlodipine and valsartan. BP at goal today, if persistently elevated can increase amlodipine to 10 mg daily.  3. HLD- c/w statin .

## 2024-09-09 NOTE — HISTORY OF PRESENT ILLNESS
[FreeTextEntry1] : 59 y/o M w/ hx of Type 2 DM diagnosed in 2000 around the time of getting steroids for MS. Prior to 2000 was diagnosed with prediabetes. Started on insulin with steroids. Had been getting infusions every 6 months which were on hold for COVID. Infusions are premedicated with steroid so takes sliding scale of Humalog with meals and bedtime which he takes until his blood glucose comes down to normal. Last infusion 6 months ago. Otherwise on metformin 1000 mg BID, Jardiance 25 mg daily, and Mounjaro 10 mg weekly which replaced Victoza 1.8 mg daily. Mounjaro increased from 5mg to 7.5mg 1/2024. Added Prandin 1mg with dinner given pattern of hyperglycemia after dinner 3/2023. Has not felt the need to take Prandin as much over the past few months since starting Mounjaro. Had COVID 9/2022 with hyperglycemia with some nonadherence to meds around that time. Now improved. Has been more adherent to low carb diet. Checks glucose >4x/day with Freestyle Bryan with average glucose 140. Reported hypoglycemia with symptoms of hypoglycemia when on Saxenda. Was told no longer covered. +polyuria with neurogenic bladder on Mybetriq, no polydipsia unless eats too much glucose. No nausea or vomiting. Had noticed some increased indigestion on Mounjaro. +weight loss now stable. +constipation.   Follows with podiatry Dr. Hearn for routine diabetes foot care.  Last seen 1/2024. No hx of neuropathy in feet. Will be making an appointment. No hx of nephropathy No hx of retinopathy. Last retinal exam with Dr. Anne appointment 8/2024.  Had mechanical fall on college tour with son. Fractures right rib 2022. Fell while walking the dog, fractured left 5th digit. No other fractures. Now seeing ortho for left hand tendonitis and arthritis. Had 2 steroid injections. Last 9/2024.   HTN on valsartan and amlodipine  HLD on statin

## 2024-09-10 LAB
ALBUMIN SERPL ELPH-MCNC: 4.8 G/DL
ALP BLD-CCNC: 84 U/L
ALT SERPL-CCNC: 50 U/L
ANION GAP SERPL CALC-SCNC: 15 MMOL/L
AST SERPL-CCNC: 26 U/L
BASOPHILS # BLD AUTO: 0.05 K/UL
BASOPHILS NFR BLD AUTO: 0.6 %
BILIRUB SERPL-MCNC: 0.3 MG/DL
BUN SERPL-MCNC: 22 MG/DL
CALCIUM SERPL-MCNC: 9.8 MG/DL
CHLORIDE SERPL-SCNC: 103 MMOL/L
CHOLEST SERPL-MCNC: 163 MG/DL
CO2 SERPL-SCNC: 20 MMOL/L
CREAT SERPL-MCNC: 0.81 MG/DL
CREAT SPEC-SCNC: 79 MG/DL
EGFR: 101 ML/MIN/1.73M2
EOSINOPHIL # BLD AUTO: 0.26 K/UL
EOSINOPHIL NFR BLD AUTO: 3.3 %
GLUCOSE SERPL-MCNC: 105 MG/DL
HCT VFR BLD CALC: 52.6 %
HDLC SERPL-MCNC: 59 MG/DL
HGB BLD-MCNC: 16.2 G/DL
IMM GRANULOCYTES NFR BLD AUTO: 0.1 %
LDLC SERPL CALC-MCNC: 82 MG/DL
LYMPHOCYTES # BLD AUTO: 2.68 K/UL
LYMPHOCYTES NFR BLD AUTO: 34 %
MAN DIFF?: NORMAL
MCHC RBC-ENTMCNC: 28.2 PG
MCHC RBC-ENTMCNC: 30.8 GM/DL
MCV RBC AUTO: 91.5 FL
MICROALBUMIN 24H UR DL<=1MG/L-MCNC: 2.5 MG/DL
MICROALBUMIN/CREAT 24H UR-RTO: 31 MG/G
MONOCYTES # BLD AUTO: 0.54 K/UL
MONOCYTES NFR BLD AUTO: 6.9 %
NEUTROPHILS # BLD AUTO: 4.34 K/UL
NEUTROPHILS NFR BLD AUTO: 55.1 %
NONHDLC SERPL-MCNC: 104 MG/DL
PLATELET # BLD AUTO: 217 K/UL
POTASSIUM SERPL-SCNC: 5.1 MMOL/L
PROT SERPL-MCNC: 7 G/DL
RBC # BLD: 5.75 M/UL
RBC # FLD: 16.7 %
SODIUM SERPL-SCNC: 138 MMOL/L
T4 FREE SERPL-MCNC: 1.2 NG/DL
TRIGL SERPL-MCNC: 129 MG/DL
TSH SERPL-ACNC: 2.49 UIU/ML
WBC # FLD AUTO: 7.88 K/UL

## 2024-09-12 ENCOUNTER — TRANSCRIPTION ENCOUNTER (OUTPATIENT)
Age: 59
End: 2024-09-12

## 2024-12-02 ENCOUNTER — RX RENEWAL (OUTPATIENT)
Age: 59
End: 2024-12-02

## 2024-12-10 ENCOUNTER — APPOINTMENT (OUTPATIENT)
Dept: ENDOCRINOLOGY | Facility: CLINIC | Age: 59
End: 2024-12-10

## 2024-12-11 ENCOUNTER — APPOINTMENT (OUTPATIENT)
Dept: ENDOCRINOLOGY | Facility: CLINIC | Age: 59
End: 2024-12-11

## 2024-12-16 ENCOUNTER — APPOINTMENT (OUTPATIENT)
Dept: SLEEP CENTER | Facility: CLINIC | Age: 59
End: 2024-12-16
Payer: COMMERCIAL

## 2024-12-16 ENCOUNTER — OUTPATIENT (OUTPATIENT)
Dept: OUTPATIENT SERVICES | Facility: HOSPITAL | Age: 59
LOS: 1 days | End: 2024-12-16
Payer: COMMERCIAL

## 2024-12-16 DIAGNOSIS — Z98.890 OTHER SPECIFIED POSTPROCEDURAL STATES: Chronic | ICD-10-CM

## 2024-12-16 PROCEDURE — 95811 POLYSOM 6/>YRS CPAP 4/> PARM: CPT | Mod: 26

## 2024-12-16 PROCEDURE — 95811 POLYSOM 6/>YRS CPAP 4/> PARM: CPT

## 2024-12-17 DIAGNOSIS — G47.33 OBSTRUCTIVE SLEEP APNEA (ADULT) (PEDIATRIC): ICD-10-CM

## 2024-12-24 ENCOUNTER — APPOINTMENT (OUTPATIENT)
Dept: PULMONOLOGY | Facility: CLINIC | Age: 59
End: 2024-12-24
Payer: COMMERCIAL

## 2024-12-24 PROCEDURE — 99441: CPT

## 2025-01-07 ENCOUNTER — NON-APPOINTMENT (OUTPATIENT)
Age: 60
End: 2025-01-07

## 2025-01-07 ENCOUNTER — APPOINTMENT (OUTPATIENT)
Dept: PULMONOLOGY | Facility: CLINIC | Age: 60
End: 2025-01-07
Payer: COMMERCIAL

## 2025-01-07 VITALS
TEMPERATURE: 98 F | BODY MASS INDEX: 35.52 KG/M2 | OXYGEN SATURATION: 97 % | RESPIRATION RATE: 16 BRPM | HEIGHT: 66 IN | DIASTOLIC BLOOD PRESSURE: 80 MMHG | HEART RATE: 79 BPM | WEIGHT: 221 LBS | SYSTOLIC BLOOD PRESSURE: 152 MMHG

## 2025-01-07 DIAGNOSIS — K21.9 GASTRO-ESOPHAGEAL REFLUX DISEASE W/OUT ESOPHAGITIS: ICD-10-CM

## 2025-01-07 DIAGNOSIS — G47.33 OBSTRUCTIVE SLEEP APNEA (ADULT) (PEDIATRIC): ICD-10-CM

## 2025-01-07 DIAGNOSIS — E66.9 OBESITY, UNSPECIFIED: ICD-10-CM

## 2025-01-07 DIAGNOSIS — R06.02 SHORTNESS OF BREATH: ICD-10-CM

## 2025-01-07 DIAGNOSIS — J45.909 UNSPECIFIED ASTHMA, UNCOMPLICATED: ICD-10-CM

## 2025-01-07 PROCEDURE — 94727 GAS DIL/WSHOT DETER LNG VOL: CPT

## 2025-01-07 PROCEDURE — 95012 NITRIC OXIDE EXP GAS DETER: CPT

## 2025-01-07 PROCEDURE — 94010 BREATHING CAPACITY TEST: CPT

## 2025-01-07 PROCEDURE — 94729 DIFFUSING CAPACITY: CPT

## 2025-01-07 PROCEDURE — 99214 OFFICE O/P EST MOD 30 MIN: CPT | Mod: 25

## 2025-01-14 ENCOUNTER — APPOINTMENT (OUTPATIENT)
Dept: ENDOCRINOLOGY | Facility: CLINIC | Age: 60
End: 2025-01-14

## 2025-01-14 VITALS
DIASTOLIC BLOOD PRESSURE: 95 MMHG | RESPIRATION RATE: 16 BRPM | BODY MASS INDEX: 34.55 KG/M2 | TEMPERATURE: 97.2 F | HEART RATE: 73 BPM | WEIGHT: 215 LBS | HEIGHT: 66 IN | OXYGEN SATURATION: 99 % | SYSTOLIC BLOOD PRESSURE: 157 MMHG

## 2025-01-14 DIAGNOSIS — E78.5 HYPERLIPIDEMIA, UNSPECIFIED: ICD-10-CM

## 2025-01-14 DIAGNOSIS — I10 ESSENTIAL (PRIMARY) HYPERTENSION: ICD-10-CM

## 2025-01-14 DIAGNOSIS — E11.65 TYPE 2 DIABETES MELLITUS WITH HYPERGLYCEMIA: ICD-10-CM

## 2025-01-14 LAB — HBA1C MFR BLD HPLC: 6.7

## 2025-01-14 PROCEDURE — 95251 CONT GLUC MNTR ANALYSIS I&R: CPT

## 2025-01-14 PROCEDURE — 83036 HEMOGLOBIN GLYCOSYLATED A1C: CPT | Mod: QW

## 2025-01-14 PROCEDURE — 99214 OFFICE O/P EST MOD 30 MIN: CPT

## 2025-01-28 ENCOUNTER — TRANSCRIPTION ENCOUNTER (OUTPATIENT)
Age: 60
End: 2025-01-28

## 2025-01-30 RX ORDER — AMOXICILLIN 500 MG/1
500 CAPSULE ORAL
Qty: 4 | Refills: 2 | Status: ACTIVE | COMMUNITY
Start: 2025-01-30 | End: 1900-01-01

## 2025-02-04 ENCOUNTER — APPOINTMENT (OUTPATIENT)
Dept: ORTHOPEDIC SURGERY | Facility: CLINIC | Age: 60
End: 2025-02-04
Payer: COMMERCIAL

## 2025-02-04 VITALS
SYSTOLIC BLOOD PRESSURE: 164 MMHG | WEIGHT: 215 LBS | BODY MASS INDEX: 34.55 KG/M2 | HEIGHT: 66 IN | DIASTOLIC BLOOD PRESSURE: 93 MMHG

## 2025-02-04 DIAGNOSIS — M25.512 PAIN IN LEFT SHOULDER: ICD-10-CM

## 2025-02-04 PROCEDURE — 99213 OFFICE O/P EST LOW 20 MIN: CPT

## 2025-02-04 PROCEDURE — 73030 X-RAY EXAM OF SHOULDER: CPT | Mod: LT

## 2025-03-13 ENCOUNTER — TRANSCRIPTION ENCOUNTER (OUTPATIENT)
Age: 60
End: 2025-03-13

## 2025-03-13 ENCOUNTER — APPOINTMENT (OUTPATIENT)
Dept: GASTROENTEROLOGY | Facility: CLINIC | Age: 60
End: 2025-03-13
Payer: COMMERCIAL

## 2025-03-13 VITALS
HEART RATE: 85 BPM | SYSTOLIC BLOOD PRESSURE: 157 MMHG | HEIGHT: 66 IN | RESPIRATION RATE: 16 BRPM | WEIGHT: 217.56 LBS | OXYGEN SATURATION: 97 % | DIASTOLIC BLOOD PRESSURE: 81 MMHG | TEMPERATURE: 98.1 F | BODY MASS INDEX: 34.96 KG/M2

## 2025-03-13 DIAGNOSIS — G35 MULTIPLE SCLEROSIS: ICD-10-CM

## 2025-03-13 DIAGNOSIS — K76.0 FATTY (CHANGE OF) LIVER, NOT ELSEWHERE CLASSIFIED: ICD-10-CM

## 2025-03-13 DIAGNOSIS — K64.9 UNSPECIFIED HEMORRHOIDS: ICD-10-CM

## 2025-03-13 PROCEDURE — 99204 OFFICE O/P NEW MOD 45 MIN: CPT

## 2025-03-13 RX ORDER — HYDROCORTISONE 25 MG/G
2.5 CREAM TOPICAL DAILY
Qty: 30 | Refills: 2 | Status: ACTIVE | COMMUNITY
Start: 2025-03-13 | End: 1900-01-01

## 2025-05-12 ENCOUNTER — APPOINTMENT (OUTPATIENT)
Dept: INTERNAL MEDICINE | Facility: CLINIC | Age: 60
End: 2025-05-12
Payer: COMMERCIAL

## 2025-05-12 ENCOUNTER — NON-APPOINTMENT (OUTPATIENT)
Age: 60
End: 2025-05-12

## 2025-05-12 VITALS
TEMPERATURE: 97.4 F | HEIGHT: 66 IN | HEART RATE: 78 BPM | SYSTOLIC BLOOD PRESSURE: 132 MMHG | DIASTOLIC BLOOD PRESSURE: 75 MMHG | WEIGHT: 213 LBS | BODY MASS INDEX: 34.23 KG/M2 | RESPIRATION RATE: 16 BRPM | OXYGEN SATURATION: 97 %

## 2025-05-12 DIAGNOSIS — Z01.818 ENCOUNTER FOR OTHER PREPROCEDURAL EXAMINATION: ICD-10-CM

## 2025-05-12 DIAGNOSIS — I10 ESSENTIAL (PRIMARY) HYPERTENSION: ICD-10-CM

## 2025-05-12 DIAGNOSIS — G47.33 OBSTRUCTIVE SLEEP APNEA (ADULT) (PEDIATRIC): ICD-10-CM

## 2025-05-12 PROCEDURE — 99214 OFFICE O/P EST MOD 30 MIN: CPT

## 2025-05-12 PROCEDURE — G2211 COMPLEX E/M VISIT ADD ON: CPT | Mod: NC

## 2025-05-12 PROCEDURE — 93000 ELECTROCARDIOGRAM COMPLETE: CPT

## 2025-05-14 ENCOUNTER — APPOINTMENT (OUTPATIENT)
Dept: ENDOCRINOLOGY | Facility: CLINIC | Age: 60
End: 2025-05-14
Payer: COMMERCIAL

## 2025-05-14 VITALS
WEIGHT: 216 LBS | HEART RATE: 72 BPM | DIASTOLIC BLOOD PRESSURE: 84 MMHG | BODY MASS INDEX: 34.72 KG/M2 | SYSTOLIC BLOOD PRESSURE: 150 MMHG | OXYGEN SATURATION: 98 % | HEIGHT: 66 IN

## 2025-05-14 DIAGNOSIS — E78.5 HYPERLIPIDEMIA, UNSPECIFIED: ICD-10-CM

## 2025-05-14 DIAGNOSIS — I10 ESSENTIAL (PRIMARY) HYPERTENSION: ICD-10-CM

## 2025-05-14 DIAGNOSIS — E11.65 TYPE 2 DIABETES MELLITUS WITH HYPERGLYCEMIA: ICD-10-CM

## 2025-05-14 DIAGNOSIS — E66.9 OBESITY, UNSPECIFIED: ICD-10-CM

## 2025-05-14 LAB — HBA1C MFR BLD HPLC: 6.5

## 2025-05-14 PROCEDURE — 99214 OFFICE O/P EST MOD 30 MIN: CPT

## 2025-05-14 PROCEDURE — 95251 CONT GLUC MNTR ANALYSIS I&R: CPT

## 2025-05-14 PROCEDURE — 83036 HEMOGLOBIN GLYCOSYLATED A1C: CPT | Mod: QW

## 2025-05-20 ENCOUNTER — TRANSCRIPTION ENCOUNTER (OUTPATIENT)
Age: 60
End: 2025-05-20

## 2025-05-30 ENCOUNTER — TRANSCRIPTION ENCOUNTER (OUTPATIENT)
Age: 60
End: 2025-05-30

## 2025-07-09 ENCOUNTER — APPOINTMENT (OUTPATIENT)
Dept: PULMONOLOGY | Facility: CLINIC | Age: 60
End: 2025-07-09
Payer: COMMERCIAL

## 2025-07-09 VITALS
WEIGHT: 212 LBS | TEMPERATURE: 96.8 F | RESPIRATION RATE: 16 BRPM | OXYGEN SATURATION: 98 % | BODY MASS INDEX: 34.07 KG/M2 | HEART RATE: 78 BPM | HEIGHT: 66 IN | DIASTOLIC BLOOD PRESSURE: 78 MMHG | SYSTOLIC BLOOD PRESSURE: 120 MMHG

## 2025-07-09 PROBLEM — Z71.89 EDUCATED ABOUT COVID-19 VIRUS INFECTION: Status: RESOLVED | Noted: 2020-07-09 | Resolved: 2025-07-09

## 2025-07-09 PROBLEM — U07.1 COVID-19 VIRUS INFECTION: Status: RESOLVED | Noted: 2022-09-29 | Resolved: 2025-07-09

## 2025-07-09 PROBLEM — U07.1 COVID-19 VIREMIA: Status: RESOLVED | Noted: 2023-09-25 | Resolved: 2025-07-09

## 2025-07-09 PROCEDURE — 94010 BREATHING CAPACITY TEST: CPT

## 2025-07-09 PROCEDURE — 99214 OFFICE O/P EST MOD 30 MIN: CPT | Mod: 25

## 2025-07-09 PROCEDURE — 95012 NITRIC OXIDE EXP GAS DETER: CPT

## 2025-08-25 ENCOUNTER — NON-APPOINTMENT (OUTPATIENT)
Age: 60
End: 2025-08-25

## 2025-09-15 ENCOUNTER — APPOINTMENT (OUTPATIENT)
Dept: ENDOCRINOLOGY | Facility: CLINIC | Age: 60
End: 2025-09-15
Payer: COMMERCIAL

## 2025-09-15 VITALS
OXYGEN SATURATION: 98 % | DIASTOLIC BLOOD PRESSURE: 77 MMHG | HEIGHT: 66 IN | BODY MASS INDEX: 33.97 KG/M2 | WEIGHT: 211.38 LBS | HEART RATE: 72 BPM | SYSTOLIC BLOOD PRESSURE: 135 MMHG

## 2025-09-15 DIAGNOSIS — E11.65 TYPE 2 DIABETES MELLITUS WITH HYPERGLYCEMIA: ICD-10-CM

## 2025-09-15 DIAGNOSIS — I10 ESSENTIAL (PRIMARY) HYPERTENSION: ICD-10-CM

## 2025-09-15 DIAGNOSIS — E66.9 OBESITY, UNSPECIFIED: ICD-10-CM

## 2025-09-15 DIAGNOSIS — E78.5 HYPERLIPIDEMIA, UNSPECIFIED: ICD-10-CM

## 2025-09-15 LAB — HBA1C MFR BLD HPLC: 6.9

## 2025-09-15 PROCEDURE — 95251 CONT GLUC MNTR ANALYSIS I&R: CPT

## 2025-09-15 PROCEDURE — 99214 OFFICE O/P EST MOD 30 MIN: CPT

## 2025-09-15 PROCEDURE — 83036 HEMOGLOBIN GLYCOSYLATED A1C: CPT | Mod: QW

## 2025-09-16 LAB
ALBUMIN SERPL ELPH-MCNC: 4.8 G/DL
ALBUMIN, RANDOM URINE: 2 MG/DL
ALP BLD-CCNC: 86 U/L
ALT SERPL-CCNC: 50 U/L
ANION GAP SERPL CALC-SCNC: 13 MMOL/L
AST SERPL-CCNC: 28 U/L
BASOPHILS # BLD AUTO: 0.06 K/UL
BASOPHILS NFR BLD AUTO: 0.8 %
BILIRUB SERPL-MCNC: 0.4 MG/DL
BUN SERPL-MCNC: 18 MG/DL
CALCIUM SERPL-MCNC: 10.8 MG/DL
CHLORIDE SERPL-SCNC: 103 MMOL/L
CHOLEST SERPL-MCNC: 154 MG/DL
CO2 SERPL-SCNC: 24 MMOL/L
CREAT SERPL-MCNC: 0.9 MG/DL
CREAT SPEC-SCNC: 86 MG/DL
EGFRCR SERPLBLD CKD-EPI 2021: 97 ML/MIN/1.73M2
EOSINOPHIL # BLD AUTO: 0.22 K/UL
EOSINOPHIL NFR BLD AUTO: 2.9 %
GLUCOSE SERPL-MCNC: 130 MG/DL
HCT VFR BLD CALC: 52.1 %
HDLC SERPL-MCNC: 61 MG/DL
HGB BLD-MCNC: 16 G/DL
IMM GRANULOCYTES NFR BLD AUTO: 0.4 %
LDLC SERPL-MCNC: 79 MG/DL
LYMPHOCYTES # BLD AUTO: 3.04 K/UL
LYMPHOCYTES NFR BLD AUTO: 39.9 %
MAN DIFF?: NORMAL
MCHC RBC-ENTMCNC: 29.1 PG
MCHC RBC-ENTMCNC: 30.7 G/DL
MCV RBC AUTO: 94.7 FL
MICROALBUMIN/CREAT 24H UR-RTO: 23 MG/G
MONOCYTES # BLD AUTO: 0.46 K/UL
MONOCYTES NFR BLD AUTO: 6 %
NEUTROPHILS # BLD AUTO: 3.8 K/UL
NEUTROPHILS NFR BLD AUTO: 50 %
NONHDLC SERPL-MCNC: 93 MG/DL
PLATELET # BLD AUTO: 225 K/UL
POTASSIUM SERPL-SCNC: 5 MMOL/L
PROT SERPL-MCNC: 7.2 G/DL
RBC # BLD: 5.5 M/UL
RBC # FLD: 16.3 %
SODIUM SERPL-SCNC: 141 MMOL/L
T4 FREE SERPL-MCNC: 1.1 NG/DL
TRIGL SERPL-MCNC: 68 MG/DL
TSH SERPL-ACNC: 1.95 UIU/ML
WBC # FLD AUTO: 7.61 K/UL

## (undated) DEVICE — DRSG COMBINE 5X9"

## (undated) DEVICE — SUT ETHIBOND 5 4-30" CCS

## (undated) DEVICE — DRAPE SURGICAL #1010

## (undated) DEVICE — SUT MONOCRYL 3-0 27" PS-2 UNDYED

## (undated) DEVICE — SUT VICRYL 2-0 27" CT-2 UNDYED

## (undated) DEVICE — DRSG WEBRIL 6"

## (undated) DEVICE — WRAP COMPRESSION CALF MED

## (undated) DEVICE — DRAPE U LONG 47X70"

## (undated) DEVICE — PACK EXTREMITY SOUTHSIDE

## (undated) DEVICE — DRAPE IOBAN 23X33"

## (undated) DEVICE — DRAIN JACKSON PRATT 3 SPRING RESERVOIR W 10FR PVC DRAIN

## (undated) DEVICE — BLANKET WARMER LOWER ADULT

## (undated) DEVICE — ELCTR PENCIL NEPTUNE SMOKE EVACUATION

## (undated) DEVICE — Device

## (undated) DEVICE — DRSG STOCKINETTE IMPERVIOUS XL

## (undated) DEVICE — KT PULSAVAC WOUND W/ SPRAYTIP

## (undated) DEVICE — DRAPE 3/4 SHEET 52X76"

## (undated) DEVICE — NDL HYPO REGULAR BEVEL 25G X 1.5"

## (undated) DEVICE — FRAZIER SUCTION TIP 10FR

## (undated) DEVICE — SOL IRR POUR NS 0.9% 1000ML

## (undated) DEVICE — ELCTR BOVIE BLADE EXTENDED 6.5"

## (undated) DEVICE — SOL IRR POUR H2O 1000ML

## (undated) DEVICE — DRAPE MAYO STAND 23"

## (undated) DEVICE — ELCTR EDGE BOVIE INSULATED BLADE TIP 2.75"

## (undated) DEVICE — SUT HEWSON RETRIEVER

## (undated) DEVICE — ELCTR GROUNDING PAD ADULT COVIDIEN

## (undated) DEVICE — DRSG COBAN 4"

## (undated) DEVICE — GLV 8 ESTEEM BLUE

## (undated) DEVICE — SUT FIBERWIRE #2 38"

## (undated) DEVICE — DRAPE U POLY BLUE 60X84"

## (undated) DEVICE — SPONGE LAP PAD W RING 18X18"

## (undated) DEVICE — DRAPE SPLIT SHEETS 77X108"

## (undated) DEVICE — NDL 1/2 TROCAR MAYO CATGUT

## (undated) DEVICE — GLV 7.5 PROTEXIS

## (undated) DEVICE — SUT VICRYL 0 27" CT-1 UNDYED